# Patient Record
Sex: FEMALE | Race: WHITE | Employment: OTHER | URBAN - METROPOLITAN AREA
[De-identification: names, ages, dates, MRNs, and addresses within clinical notes are randomized per-mention and may not be internally consistent; named-entity substitution may affect disease eponyms.]

---

## 2017-01-30 LAB
CREATININE, EXTERNAL: 0.57
LDL-C, EXTERNAL: 156

## 2018-03-29 ENCOUNTER — HOSPITAL ENCOUNTER (EMERGENCY)
Age: 48
Discharge: HOME | End: 2018-03-29
Attending: EMERGENCY MEDICINE | Admitting: EMERGENCY MEDICINE
Payer: COMMERCIAL

## 2018-03-29 VITALS
WEIGHT: 143 LBS | HEART RATE: 112 BPM | DIASTOLIC BLOOD PRESSURE: 72 MMHG | SYSTOLIC BLOOD PRESSURE: 104 MMHG | HEIGHT: 58 IN | RESPIRATION RATE: 29 BRPM | BODY MASS INDEX: 30.02 KG/M2 | TEMPERATURE: 98.5 F | OXYGEN SATURATION: 98 %

## 2018-03-29 DIAGNOSIS — J45.21 MILD INTERMITTENT ASTHMA WITH ACUTE EXACERBATION: ICD-10-CM

## 2018-03-29 DIAGNOSIS — J01.11 ACUTE RECURRENT FRONTAL SINUSITIS: Primary | ICD-10-CM

## 2018-03-29 PROCEDURE — 94640 AIRWAY INHALATION TREATMENT: CPT

## 2018-03-29 PROCEDURE — 99283 EMERGENCY DEPT VISIT LOW MDM: CPT

## 2018-03-29 PROCEDURE — 77030029684 HC NEB SM VOL KT MONA -A

## 2018-03-29 PROCEDURE — 74011000250 HC RX REV CODE- 250: Performed by: EMERGENCY MEDICINE

## 2018-03-29 PROCEDURE — 74011636637 HC RX REV CODE- 636/637: Performed by: EMERGENCY MEDICINE

## 2018-03-29 RX ORDER — IPRATROPIUM BROMIDE AND ALBUTEROL SULFATE 2.5; .5 MG/3ML; MG/3ML
3 SOLUTION RESPIRATORY (INHALATION) ONCE
Status: COMPLETED | OUTPATIENT
Start: 2018-03-29 | End: 2018-03-29

## 2018-03-29 RX ORDER — ALBUTEROL SULFATE 90 UG/1
2 AEROSOL, METERED RESPIRATORY (INHALATION)
COMMUNITY

## 2018-03-29 RX ORDER — PREDNISONE 20 MG/1
60 TABLET ORAL DAILY
Qty: 15 TAB | Refills: 0 | Status: SHIPPED | OUTPATIENT
Start: 2018-03-29 | End: 2018-04-03

## 2018-03-29 RX ORDER — PREDNISONE 20 MG/1
60 TABLET ORAL
Status: COMPLETED | OUTPATIENT
Start: 2018-03-29 | End: 2018-03-29

## 2018-03-29 RX ORDER — LORATADINE 10 MG/1
TABLET ORAL
Qty: 30 TAB | Refills: 0 | Status: SHIPPED | OUTPATIENT
Start: 2018-03-29 | End: 2018-04-04

## 2018-03-29 RX ORDER — TRIAMCINOLONE ACETONIDE 55 UG/1
2 SPRAY, METERED NASAL DAILY
Qty: 1 BOTTLE | Refills: 0 | Status: SHIPPED | OUTPATIENT
Start: 2018-03-29 | End: 2018-04-04

## 2018-03-29 RX ADMIN — IPRATROPIUM BROMIDE AND ALBUTEROL SULFATE 3 ML: .5; 3 SOLUTION RESPIRATORY (INHALATION) at 20:13

## 2018-03-29 RX ADMIN — PREDNISONE 60 MG: 20 TABLET ORAL at 20:13

## 2018-03-30 NOTE — ED NOTES
I have reviewed discharge instructions with patient. The patient verbalized understanding. Patient armband removed and shredded. Pt is ambulatory with no acute distress noted at this time, pt alert and oriented. Stable at time of discharge. Vital signs stable. Pt is being discharged with 3 prescriptions at this time.

## 2018-03-30 NOTE — ED PROVIDER NOTES
[employer]  [facility] EMERGENCY DEPT      8:03 PM    Date: 3/29/2018  Patient Name: Yeimi Dasilva    History of Presenting Illness     Chief Complaint   Patient presents with    Shortness of Breath       History Provided By: Patient    Chief Complaint: Allergies  Duration:  Hours  Timing:  Progressive   Quality: Feels like an asthma attack  Severity: Moderate  Modifying Factors: Albuterol causes no relief of sx. Associated Symptoms: pressure behind eyes, wheezing    50 y.o. female with noted past medical history including asthma and allergies who presents to the emergency department with moderate and progressive allergies. Associated sx include wheezing and feeling pressure behind eyes. Patient states that she has severe allergies and that she is unable to get all of her medication and to make an appointment with an allergist because she's recently moved and waiting on insurance. Admits to feeling like she is having an asthma attack. Has all medications except for Xolair, which she used to receive every 2 weeks. She describes herself as an allergic asthmatic. States that she also has eczema and psoriasis. Has Albuterol pump for asthma with no relief of sx and has been put on steroids for asthma in the past. Denies any smoking/drinking. No other complaints. Nursing notes regarding the HPI and triage nursing notes were reviewed. Prior medical records were reviewed. Past History     Past Medical History:  No past medical history on file. Past Surgical History:  No past surgical history on file. Family History:  No family history on file. Social History:  Social History   Substance Use Topics    Smoking status: Not on file    Smokeless tobacco: Not on file    Alcohol use Not on file       Allergies:   Allergies   Allergen Reactions    Pcn [Penicillins] Anaphylaxis    Strawberry Anaphylaxis    Sulfur Anaphylaxis    Adhesive Tape-Silicones Hives    Depakote [Divalproex] Other (comments)     Paralysis      Thorazine [Chlorpromazine] Other (comments)     Not sure      Toradol [Ketorolac] Seizures    Tramadol Seizures       Patient's primary care provider (as noted in EPIC):  Opal Vasquez MD    Review of Systems     Review of Systems   Constitutional: Negative for diaphoresis. HENT: Negative for congestion. Eyes: Negative for discharge. Positive for feelings of pressure behind eyes   Respiratory: Positive for wheezing. Negative for cough and stridor. Cardiovascular: Negative for palpitations. Gastrointestinal: Negative for diarrhea and vomiting. Genitourinary: Negative for flank pain. Musculoskeletal: Negative for back pain. Allergic/Immunologic: Positive for environmental allergies. Neurological: Negative for weakness. Psychiatric/Behavioral: Negative for hallucinations. All other systems reviewed and are negative. Physical Exam       Visit Vitals    /72    Pulse (!) 112    Temp 98.5 °F (36.9 °C)    Resp 29    Ht 4' 10\" (1.473 m)    Wt 64.9 kg (143 lb)    SpO2 98%    BMI 29.89 kg/m2       PHYSICAL EXAM:    CONSTITUTIONAL:  Alert, in no apparent distress;  well developed;  well nourished. HEAD:  Normocephalic, atraumatic. Sinuses:  Sinus pressure with leaning head forward. Sinus tenderness to percussion. EYES:  EOMI. Non-icteric sclera. Normal conjunctiva. ENTM:  Nose:  no rhinorrhea. Throat:  no erythema or exudate, mucous membranes moist.  NECK:  No JVD. Supple  RESPIRATORY:  Chest clear, equal breath sounds, good air movement. CARDIOVASCULAR:  Regular rate and rhythm. No murmurs, rubs, or gallops. GI:  Normal bowel sounds, abdomen soft and non-tender. No rebound or guarding. BACK:  Non-tender. UPPER EXT:  Normal inspection. LOWER EXT:  No edema, no calf tenderness. Distal pulses intact. NEURO:  Moves all four extremities, and grossly normal motor exam.  SKIN:  No rashes;  Normal for age.   PSYCH:  Alert and normal affect. Diagnostic Study Results     Abnormal lab results from this emergency department encounter:  Labs Reviewed - No data to display    Lab values for this patient within approximately the last 12 hours:  No results found for this or any previous visit (from the past 12 hour(s)). Radiologist and cardiologist interpretations if available at time of this note:  No results found. Medication(s) ordered for patient during this emergency visit encounter:  Medications   albuterol-ipratropium (DUO-NEB) 2.5 MG-0.5 MG/3 ML (3 mL Nebulization Given 3/29/18 2013)   predniSONE (DELTASONE) tablet 60 mg (60 mg Oral Given 3/29/18 2013)       Medical Decision Making     I am the first provider for this patient. I reviewed the vital signs, available nursing notes, past medical history, past surgical history, family history and social history. Vital Signs:  Reviewed the patient's vital signs. ED COURSE:      DIAGNOSIS:  1. Acute sinusitis  2. Allergen induced asthma exacerbation. SPECIFIC PATIENT INSTRUCTIONS FROM THE PHYSICIAN WHO TREATED YOU IN THE ER TODAY:  1. Return if any concerns or worsening of condition(s)  2. FOLLOW UP APPOINTMENT:  Your primary doctor In 2-3 days. 3.  Nasacort AQ and claritin for the next week and then stop. If sinus congestion recurs, then restart the nasacort AQ and claritin for a week at a time. 4.  Use a Nedi Pot to help clear your sinuses. You may purchase the original Nedi Pot at a pharmacy or get a generic version at [employer] or [employer]. 5.  Prednisone as prescribed until finished. Patient is improved, resting quietly and comfortably. The patient will be discharged home. The patient was reassured that these symptoms do not appear to represent a serious or life threatening condition at this time. Warning signs of worsening condition were discussed and understood by the patient.      Based on patient's age, coexisting illness, exam, and the results of this ED evaluation, the decision to treat as an outpatient was made. Based on the information available at time of discharge, acute pathology requiring immediate intervention was deemed relative unlikely. While it is impossible to completely exclude the possibility of underlying serious disease or worsening of condition, I feel the relative likelihood is extremely low. I discussed this uncertainty with the patient, who understood ED evaluation and treatment and felt comfortable with the outpatient treatment plan. All questions regarding care, test results, and follow up were answered. The patient is stable and appropriate to discharge. They understand that they should return to the emergency department for any new or worsening symptoms. I stressed the importance of follow up for repeat assessment and possibly further evaluation/treatment. Coding Diagnoses     Clinical Impression:   1. Acute recurrent frontal sinusitis    2. Mild intermittent asthma with acute exacerbation        Disposition     Disposition:  Home. Britton Gala L. Shanon Closs, M.D. NATALIE Board Certified Emergency Physician    Provider Attestation:  If a scribe was utilized in generation of this patient record, I personally performed the services described in the documentation, reviewed the documentation, as recorded by the scribe in my presence, and it accurately records the patient's history of presenting illness, review of systems, patient physical examination, and procedures performed by me as the attending physician. Britton Gala L. Shanon Closs, M.D.   NATALIE Board Certified Emergency Physician  3/29/2018.  8:04 PM

## 2018-03-30 NOTE — DISCHARGE INSTRUCTIONS
SPECIFIC PATIENT INSTRUCTIONS FROM THE PHYSICIAN WHO TREATED YOU IN THE ER TODAY:  1. Return if any concerns or worsening of condition(s)  2. FOLLOW UP APPOINTMENT:  Your primary doctor In 2-3 days. 3.  Nasacort AQ and claritin for the next week and then stop. If sinus congestion recurs, then restart the nasacort AQ and claritin for a week at a time. 4.  Use a Nedi Pot to help clear your sinuses. You may purchase the original Nedi Pot at a pharmacy or get a generic version at [employer] or [employer]. 5.  Prednisone as prescribed until finished. Asthma Attack: Care Instructions  Your Care Instructions    During an asthma attack, the airways swell and narrow. This makes it hard to breathe. Severe asthma attacks can be life-threatening, but you can help prevent them by keeping your asthma under control and treating symptoms before they get bad. Symptoms include being short of breath, having chest tightness, coughing, and wheezing. Noting and treating these symptoms can also help you avoid future trips to the emergency room. The doctor has checked you carefully, but problems can develop later. If you notice any problems or new symptoms, get medical treatment right away. Follow-up care is a key part of your treatment and safety. Be sure to make and go to all appointments, and call your doctor if you are having problems. It's also a good idea to know your test results and keep a list of the medicines you take. How can you care for yourself at home? · Follow your asthma action plan to prevent and treat attacks. If you don't have an asthma action plan, work with your doctor to create one. · Take your asthma medicines exactly as prescribed. Talk to your doctor right away if you have any questions about how to take them. ¨ Use your quick-relief medicine when you have symptoms of an attack. Quick-relief medicine is usually an albuterol inhaler.  Some people need to use quick-relief medicine before they exercise. ¨ Take your controller medicine every day, not just when you have symptoms. Controller medicine is usually an inhaled corticosteroid. The goal is to prevent problems before they occur. Don't use your controller medicine to treat an attack that has already started. It doesn't work fast enough to help. ¨ If your doctor prescribed corticosteroid pills to use during an attack, take them exactly as prescribed. It may take hours for the pills to work, but they may make the episode shorter and help you breathe better. ¨ Keep your quick-relief medicine with you at all times. · Talk to your doctor before using other medicines. Some medicines, such as aspirin, can cause asthma attacks in some people. · If you have a peak flow meter, use it to check how well you are breathing. This can help you predict when an asthma attack is going to occur. Then you can take medicine to prevent the asthma attack or make it less severe. · Do not smoke or allow others to smoke around you. Avoid smoky places. Smoking makes asthma worse. If you need help quitting, talk to your doctor about stop-smoking programs and medicines. These can increase your chances of quitting for good. · Learn what triggers an asthma attack for you, and avoid the triggers when you can. Common triggers include colds, smoke, air pollution, dust, pollen, mold, pets, cockroaches, stress, and cold air. · Avoid colds and the flu. Get a pneumococcal vaccine shot. If you have had one before, ask your doctor if you need a second dose. Get a flu vaccine every fall. If you must be around people with colds or the flu, wash your hands often. When should you call for help? Call 911 anytime you think you may need emergency care. For example, call if:  ? · You have severe trouble breathing. ?Call your doctor now or seek immediate medical care if:  ? · Your symptoms do not get better after you have followed your asthma action plan.    ? · You have new or worse trouble breathing. ? · Your coughing and wheezing get worse. ? · You cough up dark brown or bloody mucus (sputum). ? · You have a new or higher fever. ? Watch closely for changes in your health, and be sure to contact your doctor if:  ? · You need to use quick-relief medicine on more than 2 days a week (unless it is just for exercise). ? · You cough more deeply or more often, especially if you notice more mucus or a change in the color of your mucus. ? · You are not getting better as expected. Where can you learn more? Go to http://polo-jay jay.info/. Enter Q966 in the search box to learn more about \"Asthma Attack: Care Instructions. \"  Current as of: May 12, 2017  Content Version: 11.4  © 1085-1371 [employer]. Care instructions adapted under license by [employer] (which disclaims liability or warranty for this information). If you have questions about a medical condition or this instruction, always ask your healthcare professional. [place] [address] any warranty or liability for your use of this information. Learning About Asthma Triggers  What are triggers? When you have asthma, certain things can make your symptoms worse. These are called triggers. They include:  · Cigarette smoke or air pollution. · Things you are allergic to, such as:  ¨ Pollen, mold, or dust mites. ¨ Pet hair, skin, or saliva. · Illnesses, like colds, flu, or pneumonia. · Exercise. · Dry, cold air. How do triggers affect asthma? Triggers can make it harder for your lungs to work as they should and can lead to sudden difficulty breathing and other symptoms. When you are around a trigger, an asthma attack is more likely. If your symptoms are severe, you may need emergency treatment or have to go to the hospital for treatment.   If you know what your triggers are and can avoid them, you may be able to prevent asthma attacks, reduce how often you have them, and make them less severe. What can you do to avoid triggers? The first thing is to know your triggers. When you are having symptoms, note the things around you that might be causing them. Then look for patterns in what may be triggering your symptoms. Record your triggers on a piece of paper or in an asthma diary. When you have your list of possible triggers, work with your doctor to find ways to avoid them. You also can check how well your lungs are working by measuring your peak expiratory flow (PEF) throughout the day. Your PEF may drop when you are near things that trigger symptoms. Here are some ways to avoid a few common triggers. · Do not smoke or allow others to smoke around you. If you need help quitting, talk to your doctor about stop-smoking programs and medicines. These can increase your chances of quitting for good. · If there is a lot of pollution, pollen, or dust outside, stay at home and keep your windows closed. Use an air conditioner or air filter in your home. Check your local weather report or newspaper for air quality and pollen reports. · Get a flu shot every year. Talk to your doctor about getting a pneumococcal shot. Wash your hands often to prevent infections. · Avoid exercising outdoors in cold weather. If you are outdoors in cold weather, wear a scarf around your face and breathe through your nose. How can you manage an asthma attack? · If you have an asthma action plan, follow the plan. In general:  ¨ Use your quick-relief inhaler as directed by your doctor. If your symptoms do not get better after you use your medicine, have someone take you to the emergency room. Call an ambulance if needed. ¨ If your doctor has given you other inhaled medicines or steroid pills, take them as directed. Where can you learn more? Go to [employer].be  Enter M564 in the search box to learn more about \"Learning About Asthma Triggers. \"   © 0762-7307 [employer], [employer]. Care instructions adapted under license by Richelle Castillo (which disclaims liability or warranty for this information). This care instruction is for use with your licensed healthcare professional. If you have questions about a medical condition or this instruction, always ask your healthcare professional. [address] any warranty or liability for your use of this information. Content Version: 48.9.744362; Last Revised: February 23, 2012                Asthma Action Plan: After Your Visit  Your Care Instructions  An asthma action plan is based on peak flow and asthma symptoms. Sorting symptoms and peak flow into red, yellow, and green \"zones\" can help you know how bad your asthma is and what actions you should take. Work with your doctor to make your plan. An action plan may include:  · The peak flow readings and symptoms for each zone. · What medicines to take in each zone. · When to call a doctor. · A list of emergency contact numbers. · A list of your asthma triggers. Follow-up care is a key part of your treatment and safety. Be sure to make and go to all appointments, and call your doctor if you are having problems. It's also a good idea to know your test results and keep a list of the medicines you take. How can you care for yourself at home? · Take your daily medicines to help minimize long-term damage and avoid asthma attacks. · Check your peak flow every morning and evening. This is the best way to know how well your lungs are working. · Check your action plan to see what zone you are in.  ¨ If you are in the green zone, keep taking your daily asthma medicines as prescribed. ¨ If you are in the yellow zone, you may be having or will soon have an asthma attack. You may not have any symptoms, but your lungs are not working as well as they should. Take the medicines listed in your action plan.  If you stay in the yellow zone, your doctor may need to increase the dose or add a medicine. ¨ If you are in the red zone, follow your action plan. If your symptoms or peak flow don't improve soon, you may need to go to the emergency room or be admitted to the hospital.  · Use an asthma diary. Write down your peak flow readings in the asthma diary. If you have an attack, write down what caused it (if you know), the symptoms, and what medicine you took. · Make sure you know how and when to call your doctor or go to the hospital.  · Take both the asthma action plan and the asthma diary--along with your peak flow meter and medicines--when you see your doctor. Tell your doctor if you are having trouble following your action plan. When should you call for help? Call 911 anytime you think you may need emergency care. For example, call if:  · You have severe trouble breathing. Call your doctor now or seek immediate medical care if:  · Your symptoms do not get better after you have followed your asthma action plan. · You cough up yellow, dark brown, or bloody mucus (sputum). Watch closely for changes in your health, and be sure to contact your doctor if:  · Your coughing and wheezing get worse. · You need to use quick-relief medicine on more than 2 days a week (unless it is just for exercise). · You need help figuring out what is triggering your asthma attacks. Where can you learn more? Go to [employer]plus.be  Enter B511 in the search box to learn more about \"Asthma Action Plan: After Your Visit. \"   © 5920-3427 [employer]. Care instructions adapted under license by Greyson Schneider (which disclaims liability or warranty for this information). This care instruction is for use with your licensed healthcare professional. If you have questions about a medical condition or this instruction, always ask your healthcare professional. [address] any warranty or liability for your use of this information.   Content Version: 46.0.705463; Last Revised: March 9, 2012                   Sinusitis: Care Instructions  Your Care Instructions    Sinusitis is an infection of the lining of the sinus cavities in your head. Sinusitis often follows a cold. It causes pain and pressure in your head and face. In most cases, sinusitis gets better on its own in 1 to 2 weeks. But some mild symptoms may last for several weeks. Sometimes antibiotics are needed. Follow-up care is a key part of your treatment and safety. Be sure to make and go to all appointments, and call your doctor if you are having problems. It's also a good idea to know your test results and keep a list of the medicines you take. How can you care for yourself at home? · Take an over-the-counter pain medicine, such as acetaminophen (Tylenol), ibuprofen (Advil, Motrin), or naproxen (Aleve). Read and follow all instructions on the label. · If the doctor prescribed antibiotics, take them as directed. Do not stop taking them just because you feel better. You need to take the full course of antibiotics. · Be careful when taking over-the-counter cold or flu medicines and Tylenol at the same time. Many of these medicines have acetaminophen, which is Tylenol. Read the labels to make sure that you are not taking more than the recommended dose. Too much acetaminophen (Tylenol) can be harmful. · Breathe warm, moist air from a steamy shower, a hot bath, or a sink filled with hot water. Avoid cold, dry air. Using a humidifier in your home may help. Follow the directions for cleaning the machine. · Use saline (saltwater) nasal washes to help keep your nasal passages open and wash out mucus and bacteria. You can buy saline nose drops at a grocery store or drugstore. Or you can make your own at home by adding 1 teaspoon of salt and 1 teaspoon of baking soda to 2 cups of distilled water.  If you make your own, fill a bulb syringe with the solution, insert the tip into your nostril, and squeeze gently. [employer] your nose. · Put a hot, wet towel or a warm gel pack on your face 3 or 4 times a day for 5 to 10 minutes each time. · Try a decongestant nasal spray like oxymetazoline (Afrin). Do not use it for more than 3 days in a row. Using it for more than 3 days can make your congestion worse. When should you call for help? Call your doctor now or seek immediate medical care if:  ? · You have new or worse swelling or redness in your face or around your eyes. ? · You have a new or higher fever. ? Watch closely for changes in your health, and be sure to contact your doctor if:  ? · You have new or worse facial pain. ? · The mucus from your nose becomes thicker (like pus) or has new blood in it. ? · You are not getting better as expected. Where can you learn more? Go to http://polo-jay jay.info/. Enter B880 in the search box to learn more about \"Sinusitis: Care Instructions. \"  Current as of: May 12, 2017  Content Version: 11.4  © 2439-8798 [employer]. Care instructions adapted under license by [employer] (which disclaims liability or warranty for this information). If you have questions about a medical condition or this instruction, always ask your healthcare professional. [place] [address] any warranty or liability for your use of this information. Saline Nasal Washes: Care Instructions  Your Care Instructions  Saline nasal washes help keep the nasal passages open by washing out thick or dried mucus. This simple remedy can help relieve symptoms of allergies, sinusitis, and colds. It also can make the nose feel more comfortable by keeping the mucous membranes moist. You may notice a little burning sensation in your nose the first few times you use the solution, but this usually gets better in a few days. Follow-up care is a key part of your treatment and safety.  Be sure to make and go to all appointments, and call your doctor if you are having problems. It's also a good idea to know your test results and keep a list of the medicines you take. How can you care for yourself at home? · You can buy premixed saline solution in a squeeze bottle or other sinus rinse products at a drugstore. Read and follow the instructions on the label. · You also can make your own saline solution by adding 1 teaspoon of salt and 1 teaspoon of baking soda to 2 cups of distilled water. · If you use a homemade solution, pour a small amount into a clean bowl. Using a rubber bulb syringe, squeeze the syringe and place the tip in the salt water. Pull a small amount of the salt water into the syringe by relaxing your hand. · Sit down with your head tilted slightly back. Do not lie down. Put the tip of the bulb syringe or the squeeze bottle a little way into one of your nostrils. Gently drip or squirt a few drops into the nostril. Repeat with the other nostril. Some sneezing and gagging are normal at first.  · Gently blow your nose. · Wipe the syringe or bottle tip clean after each use. · Repeat this 2 or 3 times a day. · Use nasal washes gently if you have nosebleeds often. When should you call for help? Watch closely for changes in your health, and be sure to contact your doctor if:  ? · You often get nosebleeds. ? · You have problems doing the nasal washes. Where can you learn more? Go to http://polo-jay jay.info/. Enter 117 [id number] in the search box to learn more about \"Saline Nasal Washes: Care Instructions. \"  Current as of: May 12, 2017  Content Version: 11.4  © 2131-6616 [employer]. Care instructions adapted under license by [employer] (which disclaims liability or warranty for this information). If you have questions about a medical condition or this instruction, always ask your healthcare professional. [address] any warranty or liability for your use of this information.          The Sinuses: Anatomy Sketch    Current as of: May 12, 2017  Content Version: 11.4  © 3038-2808 [employer]. Care instructions adapted under license by [employer] (which disclaims liability or warranty for this information). If you have questions about a medical condition or this instruction, always ask your healthcare professional. [address] any warranty or liability for your use of this information. [employer] Activation    Thank you for requesting access to [employer]. Please follow the instructions below to securely access and download your online medical record. [employer] allows you to send messages to your doctor, view your test results, renew your prescriptions, schedule appointments, and more. How Do I Sign Up? 1. In your internet browser, go to [email or web address]. 2. Click on the First Time User? Click Here link in the Sign In box. You will see the New Member Sign Up page. 3. Enter your [employer] Access Code exactly as it appears below. You will not need to use this code after youve completed the sign-up process. If you do not sign up before the expiration date, you must request a new code. [employer] Access Code: [email or web address]  Expires: 2018  8:09 PM (This is the date your [employer] access code will )    4. Enter the last four digits of your Social Security Number (xxxx) and Date of Birth (mm/dd/yyyy) as indicated and click Submit. You will be taken to the next sign-up page. 5. Create a [employer] ID. This will be your [employer] login ID and cannot be changed, so think of one that is secure and easy to remember. 6. Create a [employer] password. You can change your password at any time. 7. Enter your Password Reset Question and Answer. This can be used at a later time if you forget your password. 8. Enter your e-mail address. You will receive e-mail notification when new information is available in [address]. 9. Click Sign Up.  You can now view and download portions of your medical record. 10. Click the Download Summary menu link to download a portable copy of your medical information. Additional Information    If you have questions, please visit the Frequently Asked Questions section of the [employer] website at [email or web address]. [email or web address]. [employer]/. Remember, [employer] is NOT to be used for urgent needs. For medical emergencies, dial 911.

## 2018-04-04 ENCOUNTER — OFFICE VISIT (OUTPATIENT)
Dept: FAMILY MEDICINE CLINIC | Age: 48
End: 2018-04-04

## 2018-04-04 ENCOUNTER — HOSPITAL ENCOUNTER (OUTPATIENT)
Dept: LAB | Age: 48
Discharge: HOME | End: 2018-04-04
Payer: MEDICAID

## 2018-04-04 VITALS
HEIGHT: 58 IN | RESPIRATION RATE: 18 BRPM | DIASTOLIC BLOOD PRESSURE: 77 MMHG | OXYGEN SATURATION: 95 % | WEIGHT: 142 LBS | TEMPERATURE: 97.3 F | BODY MASS INDEX: 29.81 KG/M2 | HEART RATE: 81 BPM | SYSTOLIC BLOOD PRESSURE: 124 MMHG

## 2018-04-04 DIAGNOSIS — Z51.81 THERAPEUTIC DRUG MONITORING: ICD-10-CM

## 2018-04-04 DIAGNOSIS — T78.40XA ALLERGIC STATE, INITIAL ENCOUNTER: ICD-10-CM

## 2018-04-04 DIAGNOSIS — F41.9 ANXIETY: ICD-10-CM

## 2018-04-04 DIAGNOSIS — Z13.6 SCREENING FOR CARDIOVASCULAR CONDITION: ICD-10-CM

## 2018-04-04 DIAGNOSIS — M79.7 FIBROMYALGIA: ICD-10-CM

## 2018-04-04 DIAGNOSIS — L98.9 SKIN LESION: ICD-10-CM

## 2018-04-04 DIAGNOSIS — J45.909 MODERATE ASTHMA WITHOUT COMPLICATION, UNSPECIFIED WHETHER PERSISTENT: Primary | ICD-10-CM

## 2018-04-04 PROBLEM — Z85.89 HISTORY OF SQUAMOUS CELL CARCINOMA: Status: ACTIVE | Noted: 2018-04-04

## 2018-04-04 PROBLEM — Z85.42 HISTORY OF UTERINE CANCER: Status: ACTIVE | Noted: 2018-04-04

## 2018-04-04 PROBLEM — Z85.41 HISTORY OF CERVICAL CANCER: Status: ACTIVE | Noted: 2018-04-04

## 2018-04-04 PROBLEM — Z85.51 HISTORY OF BLADDER CANCER: Status: ACTIVE | Noted: 2018-04-04

## 2018-04-04 PROCEDURE — 80307 DRUG TEST PRSMV CHEM ANLYZR: CPT | Performed by: FAMILY MEDICINE

## 2018-04-04 PROCEDURE — 80061 LIPID PANEL: CPT | Performed by: FAMILY MEDICINE

## 2018-04-04 PROCEDURE — 80053 COMPREHEN METABOLIC PANEL: CPT | Performed by: FAMILY MEDICINE

## 2018-04-04 PROCEDURE — 84443 ASSAY THYROID STIM HORMONE: CPT | Performed by: FAMILY MEDICINE

## 2018-04-04 PROCEDURE — 85025 COMPLETE CBC W/AUTO DIFF WBC: CPT | Performed by: FAMILY MEDICINE

## 2018-04-04 RX ORDER — LORAZEPAM 1 MG/1
TABLET ORAL
COMMUNITY
End: 2018-04-04 | Stop reason: SDUPTHER

## 2018-04-04 RX ORDER — ACETAMINOPHEN, DIPHENHYDRAMINE HCL, PHENYLEPHRINE HCL 325; 25; 5 MG/1; MG/1; MG/1
1 TABLET ORAL
COMMUNITY

## 2018-04-04 RX ORDER — LORAZEPAM 1 MG/1
1 TABLET ORAL 2 TIMES DAILY
Qty: 60 TAB | Refills: 0 | Status: SHIPPED | OUTPATIENT
Start: 2018-05-04 | End: 2018-04-04 | Stop reason: SDUPTHER

## 2018-04-04 RX ORDER — LORAZEPAM 1 MG/1
1 TABLET ORAL 2 TIMES DAILY
Qty: 60 TAB | Refills: 0 | Status: SHIPPED | OUTPATIENT
Start: 2018-04-04 | End: 2018-04-04 | Stop reason: SDUPTHER

## 2018-04-04 RX ORDER — DICLOFENAC SODIUM 10 MG/G
GEL TOPICAL 4 TIMES DAILY
COMMUNITY
End: 2020-03-13

## 2018-04-04 RX ORDER — LORAZEPAM 1 MG/1
1 TABLET ORAL 2 TIMES DAILY
Qty: 60 TAB | Refills: 0 | Status: SHIPPED | OUTPATIENT
Start: 2018-06-04

## 2018-04-04 RX ORDER — DIPHENHYDRAMINE HCL 25 MG
25 CAPSULE ORAL
COMMUNITY
End: 2021-06-17

## 2018-04-04 RX ORDER — EPINEPHRINE 0.3 MG/.3ML
0.3 INJECTION SUBCUTANEOUS
COMMUNITY

## 2018-04-04 NOTE — MR AVS SNAPSHOT
[facility] [address] [facility] 
[phone] Patient: Steph Holland MRN: [id number] [id number] Visit Information Date & Time Provider Department Dept. Phone Encounter #  
 4/4/2018  2:15 PM Roberto Mendoza MD [facility] [phone] Follow-up Instructions Return in about 1 week (around 4/11/2018), or if symptoms worsen or fail to improve, for pap smear. Upcoming Health Maintenance Date Due Pneumococcal 19-64 Medium Risk (1 of 1 - PPSV23) 3/12/1989 DTaP/Tdap/Td series (1 - Tdap) 3/12/1991 PAP AKA CERVICAL CYTOLOGY 3/12/1991 Influenza Age 5 to Adult 8/1/2017 MEDICARE YEARLY EXAM 4/4/2018 Allergies as of 4/4/2018  Review Complete On: 4/4/2018 By: Rocael Schreiber MD  
  
 Severity Noted Reaction Type Reactions Pcn [Penicillins] High 03/29/2018    Anaphylaxis [place] High 03/29/2018    Anaphylaxis Sulfur High 03/29/2018    Anaphylaxis Adhesive Tape-silicones  76/20/4522    Hives Depakote [Divalproex]  03/29/2018    Other (comments) Paralysis Thorazine [Chlorpromazine]  03/29/2018    Other (comments) Not sure Toradol [Ketorolac]  03/29/2018    Seizures Tramadol  03/29/2018    Seizures Current Immunizations  Never Reviewed No immunizations on file. Not reviewed this visit You Were Diagnosed With   
  
 Codes Comments Moderate asthma without complication, unspecified whether persistent    -  Primary ICD-10-CM: J45.909 ICD-9-CM: 493.90 History of bladder cancer     ICD-10-CM: Z85.51 
ICD-9-CM: V10.51 History of squamous cell carcinoma     ICD-10-CM: Z85.89 ICD-9-CM: V10.89 History of cervical cancer     ICD-10-CM: Z85.41 
ICD-9-CM: V10.41 History of uterine cancer     ICD-10-CM: Z85.42 
ICD-9-CM: V10.42 Skin lesion     ICD-10-CM: L98.9 ICD-9-CM: 709.9 Allergic state, initial encounter     ICD-10-CM: T78.40XA ICD-9-CM: 995.3 Fibromyalgia     ICD-10-CM: M79.7 ICD-9-CM: 729.1 Anxiety     ICD-10-CM: F41.9 ICD-9-CM: 300.00 Therapeutic drug monitoring     ICD-10-CM: Z51.81 
ICD-9-CM: V58.83 Screening for cardiovascular condition     ICD-10-CM: Z13.6 ICD-9-CM: V81.2 Vitals BP Pulse Temp Resp Height(growth percentile) Weight(growth percentile) 124/77 (BP 1 Location: Left arm, BP Patient Position: Sitting) 81 97.3 °F (36.3 °C) (Oral) 18 4' 10\" (1.473 m) 142 lb (64.4 kg) SpO2 BMI Smoking Status 95% 29.68 kg/m2 Former Smoker BMI and BSA Data Body Mass Index Body Surface Area  
 29.68 kg/m 2 1.62 m 2 Preferred Pharmacy Pharmacy Name Phone [id number]- [place] [address] [place]it [phone] Your Updated Medication List  
  
   
This list is accurate as of 4/4/18  3:26 PM.  Always use your most recent med list.  
  
  
  
  
 albuterol 90 mcg/actuation inhaler Commonly known as:  PROVENTIL HFA, VENTOLIN HFA, PROAIR HFA Take 1 Puff by inhalation. BENADRYL 25 mg capsule Generic drug:  diphenhydrAMINE Take 25 mg by mouth every six (6) hours as needed. EPIPEN 0.3 mg/0.3 mL injection Generic drug:  EPINEPHrine  
0.3 mg by IntraMUSCular route once as needed. fluticasone-salmeterol 230-21 mcg/actuation inhaler Commonly known as:  ADVAIR HFA Take 2 Puffs by inhalation two (2) times a day. LORazepam 1 mg tablet Commonly known as:  ATIVAN Take 1 Tab by mouth two (2) times a day. Max Daily Amount: 2 mg. Start taking on:  6/4/2018  
  
 melatonin 10 mg Tab Take  by mouth. QVAR 80 mcg/actuation "[employer] Generic drug:  beclomethasone Take 1 Puff by inhalation two (2) times a day. VOLTAREN 1 % Gel Generic drug:  diclofenac Apply  to affected area four (4) times daily. Prescriptions Printed Refills LORazepam (ATIVAN) 1 mg tablet 0 Starting on: 6/4/2018 Sig: Take 1 Tab by mouth two (2) times a day. Max Daily Amount: 2 mg. Class: Print Route: Oral  
  
We Performed the Following REFERRAL TO ALLERGY [REF5 Custom] Comments:  
 Patient with allergy, gets Xolair every fortnight. Due for f/u care. REFERRAL TO DERMATOLOGY [REF19 Custom] Comments:  
 Patient has skin lesion left calf, similar to the squamous cell ca she had previously. Would like referral for this. REFERRAL TO PAIN CLINIC [ZEP08 Custom] Comments:  
 Patient would like to try trigger point injections Follow-up Instructions Return in about 1 week (around 4/11/2018), or if symptoms worsen or fail to improve, for pap smear. To-Do List   
 04/04/2018 Lab:  CBC WITH AUTOMATED DIFF   
  
 04/04/2018 Lab:  COMPLIANCE DRUG SCREEN/PRESCRIPTION MONITORING   
  
 04/04/2018 Lab:  LIPID PANEL   
  
 04/04/2018 Lab:  METABOLIC PANEL, COMPREHENSIVE   
  
 04/04/2018 Lab:  TSH 3RD GENERATION Referral Information Referral ID Referred By Referred To  
  
 [id number] Wolf Ayala N Not Available Visits Status Start Date End Date 1 New Request 4/4/18 4/4/19 If your referral has a status of pending review or denied, additional information will be sent to support the outcome of this decision. Referral ID Referred By Referred To  
 [id number] Wolf Ayala N Not Available Visits Status Start Date End Date 1 New Request 4/4/18 4/4/19 If your referral has a status of pending review or denied, additional information will be sent to support the outcome of this decision. Referral ID Referred By Referred To  
 [id number] Wolf Ayala N Not Available Visits Status Start Date End Date 1 New Request 4/4/18 4/4/19 If your referral has a status of pending review or denied, additional information will be sent to support the outcome of this decision. Introducing [facility]! [facility] introduces [facility] patient portal. Now you can access parts of your medical record, email your doctor's office, and request medication refills online. 1. In your internet browser, go to [email or web address] 2. Click on the First Time User? Click Here link in the Sign In box. You will see the New Member Sign Up page. 3. Enter your [facility] Access Code exactly as it appears below. You will not need to use this code after youve completed the sign-up process. If you do not sign up before the expiration date, you must request a new code. · [facility] Access Code: [email or web address] Expires: 6/27/2018  8:09 PM 
 
4. Enter the last four digits of your Social Security Number (xxxx) and Date of Birth (mm/dd/yyyy) as indicated and click Submit. You will be taken to the next sign-up page. 5. Create a [facility] ID. This will be your [facility] login ID and cannot be changed, so think of one that is secure and easy to remember. 6. Create a [facility] password. You can change your password at any time. 7. Enter your Password Reset Question and Answer. This can be used at a later time if you forget your password. 8. Enter your e-mail address. You will receive e-mail notification when new information is available in [address] 9. Click Sign Up. You can now view and download portions of your medical record. 10. Click the Download Summary menu link to download a portable copy of your medical information. If you have questions, please visit the Frequently Asked Questions section of the [employer] website. Remember, [facility] is NOT to be used for urgent needs. For medical emergencies, dial 911. Now available from your iPhone and Android! Please provide this summary of care documentation to your next provider. Your primary care clinician is listed as Roberto Prado. If you have any questions after today's visit, please call [phone].

## 2018-04-04 NOTE — PROGRESS NOTES
Chief Complaint   Patient presents with   [facility] Establish Care    Medication Evaluation     allergies/ asthma      1. Have you been to the ER, urgent care clinic since your last visit? Hospitalized since your last visit? No      2. Have you seen or consulted any other health care providers outside of the [facility] since your last visit? Include any pap smears or colon screening. No     HPI  Afshan Phan comes in to establish care. Anxiety: Patient has a history of anxiety. Has been followed up previously by her primary care provider and has been on lorazepam for this. Has been taking 1 mg 4 times a day. She states that she has tried very many other medications but that these did not work for her and she was unable to tolerate some of them. She declines to go to behavioral health saying that the in the past she went and that they did not help her. This was despite having many issues in her life that resulted in her losing a lot of things that she had worked hard for. States that she would not go to behavioral health if I asked her to do so. I did let her know that the at times I would need her to see behavioral health if I am unable to manage her anxiety and the that his what I do my practice. He does states that if it came to that she would leave the practice. I will cut back on her lorazepam.  We will have her take 1 mg twice a day and no more than that. I did recommend other medications to use for anxiety but states that she does not want to use these as they have not walked in the past.  I did have her sign a controlled substance agreement and that she also did give us urine for compliance drug screen. I did review her . No concerns. I did discuss various other methods of dealing with anxiety. Skin lesion: Patient has a skin lesion left cuff area that has changed. Started off as an eczematous plaque but later on became ulcerated and excoriated. Slight bleeding.   She does have a history of squamous cell carcinoma. I will refer to dermatology for possible biopsy. Eczema: Patient has a history of eczema. She does apply topical steroid cream.  Would like to see dermatology for this. Referral is placed. Fibromyalgia: Patient has fibromyalgia. She has previously been treated with trigger point injections. These did seem to help. This was done in the pain clinic in [facility]. She does request referral to a pain clinic. Continues to have generalized muscle aches and fatigue. Allergies: Patient has a history of allergies. She does states Xolair allergy shots. These were being given by her previous allergist in [facility]. Has not had these since December last year. I will place referral to see an allergist.  Asthma: Patient has a history of asthma. Has been followed up by an allergist.  She is on Advair, Qvar and albuterol. Does not need a refill of medication at the moment  Bipolar disorder: Patient has been diagnosed with bipolar disorder. She is not on any medication at the moment. Has declined to be seen by someone in behavioral health. We discussed this. Feels that she does not want someone telling her what to do. Past Medical History  Past Medical History:   Diagnosis Date    Asthma     Bipolar 1 disorder ([address]     Hiatal hernia        Surgical History  Past Surgical History:   Procedure Laterality Date    HX  SECTION      HX HYSTERECTOMY          Medications  Current Outpatient Prescriptions   Medication Sig Dispense Refill    diphenhydrAMINE (BENADRYL) 25 mg capsule Take 25 mg by mouth every six (6) hours as needed.  diclofenac (VOLTAREN) 1 % gel Apply  to affected area four (4) times daily.  melatonin 10 mg tab Take  by mouth.  EPINEPHrine (EPIPEN) 0.3 mg/0.3 mL injection 0.3 mg by IntraMUSCular route once as needed.  LORazepam (ATIVAN) 1 mg tablet Take 1 Tab by mouth two (2) times a day.  Max Daily Amount: 2 mg. 60 Tab 0    fluticasone-salmeterol (ADVAIR HFA) 230-21 mcg/actuation inhaler Take 2 Puffs by inhalation two (2) times a day.  albuterol (PROVENTIL HFA, VENTOLIN HFA, PROAIR HFA) 90 mcg/actuation inhaler Take 1 Puff by inhalation.  beclomethasone (QVAR) 80 mcg/actuation aero Take 1 Puff by inhalation two (2) times a day. Allergies  Allergies   Allergen Reactions    Pcn [Penicillins] Anaphylaxis    Strawberry Anaphylaxis    Sulfur Anaphylaxis    Adhesive Tape-Silicones Hives    Depakote [Divalproex] Other (comments)     Paralysis      Thorazine [Chlorpromazine] Other (comments)     Not sure      Toradol [Ketorolac] Seizures    Tramadol Seizures       Family History  Family History   Problem Relation Age of Onset    Thyroid Disease Mother     Diabetes Mother     Heart Attack Mother        Social History  Social History     Social History    Marital status:      Spouse name: N/A    Number of children: N/A    Years of education: N/A     Occupational History    Not on file.      Social History Main Topics    Smoking status: Former Smoker    Smokeless tobacco: Former User    Alcohol use 0.6 oz/week     1 Shots of liquor per week    Drug use: Not on file    Sexual activity: Not on file     Other Topics Concern    Not on file     Social History Narrative    No narrative on file       Review of Systems  Review of Systems - History obtained from chart review and the patient  General ROS: negative for - chills or fever  Psychological ROS: positive for - anxiety  Ophthalmic ROS: positive for - uses glasses  ENT ROS: negative  Allergy and Immunology ROS: positive for - seasonal allergies  Hematological and Lymphatic ROS: negative  Endocrine ROS: negative  Respiratory ROS: positive for - cough, shortness of breath and wheezing  Cardiovascular ROS: negative  Gastrointestinal ROS: no abdominal pain, change in bowel habits, or black or bloody stools  Genito-Urinary ROS: negative  Musculoskeletal ROS: positive for - joint pain and muscle pain  Neurological ROS: negative  Dermatological ROS: positive for - eczema and skin lesion changes    Vital Signs  Visit Vitals    /77 (BP 1 Location: Left arm, BP Patient Position: Sitting)    Pulse 81    Temp 97.3 °F (36.3 °C) (Oral)    Resp 18    Ht 4' 10\" (1.473 m)    Wt 142 lb (64.4 kg)    SpO2 95%    BMI 29.68 kg/m2         Physical Exam  Physical Examination: General appearance - oriented to person, place, and time, overweight, acyanotic, in no respiratory distress and well hydrated  Mental status - anxious, patient almost tearful when he talks about her past but is insistent on having something done the way she would want   Eyes - sclera anicteric  Ears - hearing grossly normal bilaterally  Nose - normal and patent, no erythema, discharge or polyps  Mouth - mucous membranes moist, pharynx normal without lesions  Neck -paracervical muscle discomfort to palpation    Lymphatics - no palpable lymphadenopathy  Chest - clear to auscultation, no wheezes, rales or rhonchi, symmetric air entry  Heart - normal rate and regular rhythm, S1 and S2 normal  Abdomen - no rebound tenderness noted  Back exam - limited range of motion paralumbar muscles  Neurological - Patient with generalized numbness, tingling and muscle aches,  no focal weakness  Extremities - intact peripheral pulses  Skin -patient has generalized eczematous rash all over the body. She also has lesion that is on her left posterior leg about 1 x 2 cm that is excoriated and has a scab forming. This is tender but there is no surrounding erythema, no discharge. Results  No results found for this or any previous visit. ASSESSMENT and PLAN    ICD-10-CM ICD-9-CM    1. Moderate asthma without complication, unspecified whether persistent J45.909 493.90    2. Skin lesion L98.9 709.9 REFERRAL TO DERMATOLOGY   3.  Allergic state, initial encounter T78.40XA 995.3 REFERRAL TO ALLERGY   4. Fibromyalgia M79.7 729.1 REFERRAL TO PAIN CLINIC      LORazepam (ATIVAN) 1 mg tablet      COLLECTION VENOUS BLOOD,VENIPUNCTURE      DISCONTINUED: LORazepam (ATIVAN) 1 mg tablet      DISCONTINUED: LORazepam (ATIVAN) 1 mg tablet   5. Anxiety F41.9 300.00 TSH 3RD GENERATION   6. Therapeutic drug monitoring Z51.81 V58.83 COMPLIANCE DRUG SCREEN/PRESCRIPTION MONITORING      COLLECTION VENOUS BLOOD,VENIPUNCTURE   7. Screening for cardiovascular condition Z13.6 V81.2 CBC WITH AUTOMATED DIFF      LIPID PANEL      METABOLIC PANEL, COMPREHENSIVE      COLLECTION VENOUS BLOOD,VENIPUNCTURE     lab results and schedule of future lab studies reviewed with patient  reviewed diet, exercise and weight control  reviewed medications and side effects in detail    I have discussed the diagnosis with the patient and the intended plan of care as seen in the above orders. The patient has received an after-visit summary and questions were answered concerning future plans. I have discussed medication, side effects, and warnings with the patient in detail. The patient verbalized understanding and is in agreement with the plan of care. The patient will contact the office with any additional concerns. More than 50% of visit spent counseling and coordinating care with patient face to face on anxiety and ways to manage this. Discussed need for compliance with treatment regimen, follow-up, and taking responsibility for her disease process.         Blake Workman MD

## 2018-04-05 PROBLEM — F41.9 ANXIETY: Status: ACTIVE | Noted: 2018-04-05

## 2018-04-05 LAB
ALBUMIN SERPL-MCNC: 3.9 G/DL (ref 3.4–5)
ALBUMIN/GLOB SERPL: 1.2 {RATIO} (ref 0.8–1.7)
ALP SERPL-CCNC: 106 U/L (ref 45–117)
ALT SERPL-CCNC: 89 U/L (ref 13–56)
ANION GAP SERPL CALC-SCNC: 7 MMOL/L (ref 3–18)
AST SERPL-CCNC: 38 U/L (ref 15–37)
BASOPHILS # BLD: 0 K/UL (ref 0–0.06)
BASOPHILS NFR BLD: 0 % (ref 0–2)
BILIRUB SERPL-MCNC: 0.4 MG/DL (ref 0.2–1)
BUN SERPL-MCNC: 15 MG/DL (ref 7–18)
BUN/CREAT SERPL: 17 (ref 12–20)
CALCIUM SERPL-MCNC: 8.9 MG/DL (ref 8.5–10.1)
CHLORIDE SERPL-SCNC: 106 MMOL/L (ref 100–108)
CHOLEST SERPL-MCNC: 199 MG/DL
CO2 SERPL-SCNC: 30 MMOL/L (ref 21–32)
CREAT SERPL-MCNC: 0.88 MG/DL (ref 0.6–1.3)
DIFFERENTIAL METHOD BLD: ABNORMAL
EOSINOPHIL # BLD: 0.2 K/UL (ref 0–0.4)
EOSINOPHIL NFR BLD: 1 % (ref 0–5)
ERYTHROCYTE [DISTWIDTH] IN BLOOD BY AUTOMATED COUNT: 15.5 % (ref 11.6–14.5)
GLOBULIN SER CALC-MCNC: 3.2 G/DL (ref 2–4)
GLUCOSE SERPL-MCNC: 108 MG/DL (ref 74–99)
HCT VFR BLD AUTO: 44.4 % (ref 35–45)
HDLC SERPL-MCNC: 56 MG/DL (ref 40–60)
HDLC SERPL: 3.6 {RATIO} (ref 0–5)
HGB BLD-MCNC: 13.4 G/DL (ref 12–16)
LDLC SERPL CALC-MCNC: 120.8 MG/DL (ref 0–100)
LIPID PROFILE,FLP: ABNORMAL
LYMPHOCYTES # BLD: 1.9 K/UL (ref 0.9–3.6)
LYMPHOCYTES NFR BLD: 16 % (ref 21–52)
MCH RBC QN AUTO: 26.4 PG (ref 24–34)
MCHC RBC AUTO-ENTMCNC: 30.2 G/DL (ref 31–37)
MCV RBC AUTO: 87.4 FL (ref 74–97)
MONOCYTES # BLD: 0.6 K/UL (ref 0.05–1.2)
MONOCYTES NFR BLD: 5 % (ref 3–10)
NEUTS SEG # BLD: 9.1 K/UL (ref 1.8–8)
NEUTS SEG NFR BLD: 78 % (ref 40–73)
PLATELET # BLD AUTO: 428 K/UL (ref 135–420)
PMV BLD AUTO: 11.6 FL (ref 9.2–11.8)
POTASSIUM SERPL-SCNC: 3.7 MMOL/L (ref 3.5–5.5)
PROT SERPL-MCNC: 7.1 G/DL (ref 6.4–8.2)
RBC # BLD AUTO: 5.08 M/UL (ref 4.2–5.3)
SODIUM SERPL-SCNC: 143 MMOL/L (ref 136–145)
TRIGL SERPL-MCNC: 111 MG/DL (ref ?–150)
TSH SERPL DL<=0.05 MIU/L-ACNC: 0.71 UIU/ML (ref 0.36–3.74)
VLDLC SERPL CALC-MCNC: 22.2 MG/DL
WBC # BLD AUTO: 11.8 K/UL (ref 4.6–13.2)

## 2018-04-12 LAB — DRUGS UR: NORMAL

## 2018-04-16 ENCOUNTER — TELEPHONE (OUTPATIENT)
Dept: FAMILY MEDICINE CLINIC | Age: 48
End: 2018-04-16

## 2018-04-16 NOTE — TELEPHONE ENCOUNTER
Incoming called received at this time with patient complaining for hoarness/ no voice at this time. Patient also states she is coughing up green mucous at this time. Patient wanted to know if she should go to the emergency room or wait for the first avaiable appointment which is tomorrow at 10. Patient denied Fever, chills, and sweats at this time. Advise patient if she getting worse she can either go to the emergency room or urgent care at this time.  Patient verbalized understanding

## 2018-04-17 ENCOUNTER — OFFICE VISIT (OUTPATIENT)
Dept: FAMILY MEDICINE CLINIC | Age: 48
End: 2018-04-17

## 2018-04-17 ENCOUNTER — HOSPITAL ENCOUNTER (EMERGENCY)
Age: 48
Discharge: HOME | End: 2018-04-17
Attending: EMERGENCY MEDICINE
Payer: MEDICAID

## 2018-04-17 VITALS
RESPIRATION RATE: 18 BRPM | DIASTOLIC BLOOD PRESSURE: 87 MMHG | BODY MASS INDEX: 29.39 KG/M2 | HEART RATE: 96 BPM | WEIGHT: 140 LBS | HEIGHT: 58 IN | SYSTOLIC BLOOD PRESSURE: 116 MMHG | TEMPERATURE: 97.3 F | OXYGEN SATURATION: 93 %

## 2018-04-17 VITALS
OXYGEN SATURATION: 95 % | TEMPERATURE: 97.4 F | HEART RATE: 90 BPM | SYSTOLIC BLOOD PRESSURE: 132 MMHG | BODY MASS INDEX: 29.39 KG/M2 | DIASTOLIC BLOOD PRESSURE: 89 MMHG | WEIGHT: 140 LBS | RESPIRATION RATE: 18 BRPM | HEIGHT: 58 IN

## 2018-04-17 DIAGNOSIS — B97.89 VIRAL LARYNGITIS: Primary | ICD-10-CM

## 2018-04-17 DIAGNOSIS — J04.0 LARYNGITIS: Primary | ICD-10-CM

## 2018-04-17 DIAGNOSIS — R49.0 HOARSENESS OF VOICE: ICD-10-CM

## 2018-04-17 DIAGNOSIS — J04.0 VIRAL LARYNGITIS: Primary | ICD-10-CM

## 2018-04-17 LAB
BASOPHILS # BLD: 0 K/UL (ref 0–0.06)
BASOPHILS NFR BLD: 0 % (ref 0–2)
DIFFERENTIAL METHOD BLD: ABNORMAL
EOSINOPHIL # BLD: 0.8 K/UL (ref 0–0.4)
EOSINOPHIL NFR BLD: 8 % (ref 0–5)
ERYTHROCYTE [DISTWIDTH] IN BLOOD BY AUTOMATED COUNT: 14.8 % (ref 11.6–14.5)
HCT VFR BLD AUTO: 44.7 % (ref 35–45)
HETEROPH AB SER QL: NEGATIVE
HGB BLD-MCNC: 14.9 G/DL (ref 12–16)
LYMPHOCYTES # BLD: 2.3 K/UL (ref 0.9–3.6)
LYMPHOCYTES NFR BLD: 22 % (ref 21–52)
MCH RBC QN AUTO: 27.1 PG (ref 24–34)
MCHC RBC AUTO-ENTMCNC: 33.3 G/DL (ref 31–37)
MCV RBC AUTO: 81.3 FL (ref 74–97)
MONOCYTES # BLD: 0.8 K/UL (ref 0.05–1.2)
MONOCYTES NFR BLD: 8 % (ref 3–10)
NEUTS SEG # BLD: 6.4 K/UL (ref 1.8–8)
NEUTS SEG NFR BLD: 62 % (ref 40–73)
PLATELET # BLD AUTO: 338 K/UL (ref 135–420)
PMV BLD AUTO: 10.7 FL (ref 9.2–11.8)
RBC # BLD AUTO: 5.5 M/UL (ref 4.2–5.3)
S PYO AG THROAT QL: NEGATIVE
VALID INTERNAL CONTROL?: YES
WBC # BLD AUTO: 10.2 K/UL (ref 4.6–13.2)

## 2018-04-17 PROCEDURE — 85025 COMPLETE CBC W/AUTO DIFF WBC: CPT | Performed by: EMERGENCY MEDICINE

## 2018-04-17 PROCEDURE — 74011250636 HC RX REV CODE- 250/636: Performed by: EMERGENCY MEDICINE

## 2018-04-17 PROCEDURE — 86308 HETEROPHILE ANTIBODY SCREEN: CPT | Performed by: EMERGENCY MEDICINE

## 2018-04-17 PROCEDURE — 99282 EMERGENCY DEPT VISIT SF MDM: CPT

## 2018-04-17 PROCEDURE — 96360 HYDRATION IV INFUSION INIT: CPT

## 2018-04-17 RX ORDER — ONDANSETRON 4 MG/1
4 TABLET, ORALLY DISINTEGRATING ORAL
Qty: 10 TAB | Refills: 0 | Status: SHIPPED | OUTPATIENT
Start: 2018-04-17 | End: 2018-05-27

## 2018-04-17 RX ADMIN — SODIUM CHLORIDE 1000 ML: 900 INJECTION, SOLUTION INTRAVENOUS at 17:05

## 2018-04-17 NOTE — PROGRESS NOTES
Chief Complaint   Patient presents with    Laryngitis    Fever     100 with red bumps started yesterday      1. Have you been to the ER, urgent care clinic since your last visit? Hospitalized since your last visit? No    2. Have you seen or consulted any other health care providers outside of the [address] [place] [address] since your last visit? Include any pap smears or colon screening. No     HPI  Afshan Malave comes in for acute care. Patient has hoarseness of voice and has been having fever and chills. Hoarseness of voice has been ongoing for days. States that he has had fever we up to 100°. There is some mild odynophagia. She feels her tonsils are swollen. Denies. Diaphoresis. She does feel tightness in her chest.  Does have occasional wheeze. Has tried taking over-the-counter medication without much relief. Patient comes in for evaluation. We did do a rapid strep test that is negative. This is more likely a viral illness. Patient is apprehensive because nothing has come out and she would like to know. In the past when she had similar symptoms she eventually had to be admitted in the hospital and have IV treatment done. We did discuss referral to see an ENT physician. I am not sure I can guarantee that she will be seen today in an ENT physicians office. Given she has a chest tightness and the feels that her symptoms have gotten worse I will refer her to the emergency room for further evaluation. May need radiological testing including chest CT scan. Her oxygenation here is normal.    Skin rash: Patient has a rash on her anterior abdominal wall that is maculopapular and itchy. She has a history of eczema but this feels like a different rash. She wondered about measles. I discussed measles symptoms with her. This is unlikely. This might be a viral exanthem rash or part of her eczema. Currently she will go to the emergency room for evaluation and testing.   We will follow-up with those results. Past Medical History  Past Medical History:   Diagnosis Date    Asthma     Bipolar 1 disorder ([address]     Hiatal hernia        Surgical History  Past Surgical History:   Procedure Laterality Date    HX  SECTION      HX HYSTERECTOMY          Medications  Current Outpatient Prescriptions   Medication Sig Dispense Refill    diphenhydrAMINE (BENADRYL) 25 mg capsule Take 25 mg by mouth every six (6) hours as needed.  diclofenac (VOLTAREN) 1 % gel Apply  to affected area four (4) times daily.  melatonin 10 mg tab Take  by mouth.  EPINEPHrine (EPIPEN) 0.3 mg/0.3 mL injection 0.3 mg by IntraMUSCular route once as needed.  [START ON 2018] LORazepam (ATIVAN) 1 mg tablet Take 1 Tab by mouth two (2) times a day. Max Daily Amount: 2 mg. 60 Tab 0    fluticasone-salmeterol (ADVAIR HFA) 230-21 mcg/actuation inhaler Take 2 Puffs by inhalation two (2) times a day.  albuterol (PROVENTIL HFA, VENTOLIN HFA, PROAIR HFA) 90 mcg/actuation inhaler Take 1 Puff by inhalation.  beclomethasone (QVAR) 80 mcg/actuation aero Take 1 Puff by inhalation two (2) times a day. Allergies  Allergies   Allergen Reactions    Pcn [Penicillins] Anaphylaxis    Strawberry Anaphylaxis    Sulfur Anaphylaxis    Adhesive Tape-Silicones Hives    Depakote [Divalproex] Other (comments)     Paralysis      Thorazine [Chlorpromazine] Other (comments)     Not sure      Toradol [Ketorolac] Seizures    Tramadol Seizures       Family History  Family History   Problem Relation Age of Onset    Thyroid Disease Mother     Diabetes Mother     Heart Attack Mother        Social History  Social History     Social History    Marital status:      Spouse name: N/A    Number of children: N/A    Years of education: N/A     Occupational History    Not on file.      Social History Main Topics    Smoking status: Former Smoker    Smokeless tobacco: Former User    Alcohol use 0.6 oz/week 1 Shots of liquor per week    Drug use: Not on file    Sexual activity: Not on file     Other Topics Concern    Not on file     Social History Narrative    No narrative on file       Review of Systems  Review of Systems - History obtained from chart review and the patient  General ROS: positive for  - chills, fatigue, fever and malaise  Psychological ROS: positive for - anxiety and behavioral disorder  Ophthalmic ROS: positive for - uses glasses  ENT ROS: positive for - sinus pain, sneezing, sore throat and vocal changes  Allergy and Immunology ROS: positive for - seasonal allergies  Respiratory ROS: positive for - shortness of breath and wheezing  negative for - cough or hemoptysis  Cardiovascular ROS: no chest pain or dyspnea on exertion  Gastrointestinal ROS: no abdominal pain, change in bowel habits, or black or bloody stools  Genito-Urinary ROS: no dysuria, trouble voiding, or hematuria  Musculoskeletal ROS: negative  Neurological ROS: no TIA or stroke symptoms  Dermatological ROS: positive for - pruritus and rash    Vital Signs  Visit Vitals    /89 (BP 1 Location: Left arm, BP Patient Position: Sitting)    Pulse 90    Temp 97.4 °F (36.3 °C) (Oral)    Resp 18    Ht 4' 10\" (1.473 m)    Wt 140 lb (63.5 kg)    SpO2 95%    BMI 29.26 kg/m2         Physical Exam  Physical Examination: General appearance - acyanotic, in no respiratory distress and ill-appearing  Mental status - alert, oriented to person, place, and time, affect appropriate to mood  Nose - mucosal congestion  Mouth - mucous membranes moist, pharynx normal without lesions  Neck - supple, no significant adenopathy  Lymphatics - no palpable lymphadenopathy  Chest - clear to auscultation, no wheezes, rales or rhonchi, symmetric air entry  Heart - S1 and S2 normal  Neurological - neck supple without rigidity  Musculoskeletal - no muscular tenderness noted  Extremities - no pedal edema noted  Skin - DERMATITIS NOTED: eczematoid dermatitis all over the extremities and the torso, patient also has a maculopapular rash anterior abdominal wall on the right and the left    Results  Results for orders placed or performed during the hospital encounter of 04/04/18   CBC WITH AUTOMATED DIFF   Result Value Ref Range    WBC 11.8 4.6 - 13.2 K/uL    RBC 5.08 4.20 - 5.30 M/uL    HGB 13.4 12.0 - 16.0 g/dL    HCT 44.4 35.0 - 45.0 %    MCV 87.4 74.0 - 97.0 FL    MCH 26.4 24.0 - 34.0 PG    MCHC 30.2 (L) 31.0 - 37.0 g/dL    RDW 15.5 (H) 11.6 - 14.5 %    PLATELET 741 (H) 357 - 420 K/uL    MPV 11.6 9.2 - 11.8 FL    NEUTROPHILS 78 (H) 40 - 73 %    LYMPHOCYTES 16 (L) 21 - 52 %    MONOCYTES 5 3 - 10 %    EOSINOPHILS 1 0 - 5 %    BASOPHILS 0 0 - 2 %    ABS. NEUTROPHILS 9.1 (H) 1.8 - 8.0 K/UL    ABS. LYMPHOCYTES 1.9 0.9 - 3.6 K/UL    ABS. MONOCYTES 0.6 0.05 - 1.2 K/UL    ABS. EOSINOPHILS 0.2 0.0 - 0.4 K/UL    ABS. BASOPHILS 0.0 0.0 - 0.06 K/UL    DF AUTOMATED     LIPID PANEL   Result Value Ref Range    LIPID PROFILE          Cholesterol, total 199 <200 MG/DL    Triglyceride 111 <150 MG/DL    HDL Cholesterol 56 40 - 60 MG/DL    LDL, calculated 120.8 (H) 0 - 100 MG/DL    VLDL, calculated 22.2 MG/DL    CHOL/HDL Ratio 3.6 0 - 5.0     METABOLIC PANEL, COMPREHENSIVE   Result Value Ref Range    Sodium 143 136 - 145 mmol/L    Potassium 3.7 3.5 - 5.5 mmol/L    Chloride 106 100 - 108 mmol/L    CO2 30 21 - 32 mmol/L    Anion gap 7 3.0 - 18 mmol/L    Glucose 108 (H) 74 - 99 mg/dL    BUN 15 7.0 - 18 MG/DL    Creatinine 0.88 0.6 - 1.3 MG/DL    BUN/Creatinine ratio 17 12 - 20      GFR est AA >60 >60 ml/min/1.73m2    GFR est non-AA >60 >60 ml/min/1.73m2    Calcium 8.9 8.5 - 10.1 MG/DL    Bilirubin, total 0.4 0.2 - 1.0 MG/DL    ALT (SGPT) 89 (H) 13 - 56 U/L    AST (SGOT) 38 (H) 15 - 37 U/L    Alk.  phosphatase 106 45 - 117 U/L    Protein, total 7.1 6.4 - 8.2 g/dL    Albumin 3.9 3.4 - 5.0 g/dL    Globulin 3.2 2.0 - 4.0 g/dL    A-G Ratio 1.2 0.8 - 1.7     TSH 3RD GENERATION   Result Value Ref Range    TSH 0.71 0.36 - 3.74 uIU/mL   COMPLIANCE DRUG SCREEN/PRESCRIPTION MONITORING   Result Value Ref Range    Summary FINAL        ASSESSMENT and PLAN    ICD-10-CM ICD-9-CM    1. Laryngitis J04.0 464.00 REFERRAL TO ENT-OTOLARYNGOLOGY   2. Hoarseness of voice R49.0 784.42 AMB POC RAPID STREP A      REFERRAL TO ENT-OTOLARYNGOLOGY   Rapid strep is negative. This is likely viral laryngitis. Patient does insist that she is unwell and feels that things are going to get worse. She states that her chest is tight and she cannot breathe. Given this I will refer her to the emergency room for further evaluation and testing. I did let her know that we would send her to an ENT doctor. She does want a laryngoscopy done. We will try to get her in as soon as possible. lab results and schedule of future lab studies reviewed with patient  reviewed diet, exercise and weight control  reviewed medications and side effects in detail    I have discussed the diagnosis with the patient and the intended plan of care as seen in the above orders. The patient has received an after-visit summary and questions were answered concerning future plans. I have discussed medication, side effects, and warnings with the patient in detail. The patient verbalized understanding and is in agreement with the plan of care. The patient will contact the office with any additional concerns.     Bia Vicente MD

## 2018-04-17 NOTE — ED PROVIDER NOTES
EMERGENCY DEPARTMENT HISTORY AND PHYSICAL EXAM    3:53 PM      Date: 4/17/2018  Patient Name: Ok Felder    History of Presenting Illness     Chief Complaint   Patient presents with    Skin Problem     body rash    Other     loss voice     History Provided By: Patient    Chief Complaint: Voice change  Duration: Yesterday   Timing:  Acute  Location: Voice  Quality: Loss  Severity: N/A  Modifying Factors: No relieving or worsening factors   Associated Symptoms: Rash      Additional History (Context): Ok Felder is a 50 y.o. female with PMHX of asthma, eczema, and fibromyalgia who presents with acute loss of voice, onset yesterday. Associated sxs include an itchy papular rash over abdomen and hands that began suddenly last night. Pt was seen by her PCP, had a negative strep, and \"was sent\" here. Pt notes laryngitis ~3 years ago, which she was admitted for. No modifying or aggravating factors were reported. No other symptoms or concerns were expressed. PCP: Pratik Worrell MD    Current Facility-Administered Medications   Medication Dose Route Frequency Provider Last Rate Last Dose    sodium chloride 0.9 % bolus infusion 1,000 mL  1,000 mL IntraVENous ONCE Madeline Stover MD 1,000 mL/hr at 04/17/18 1705 1,000 mL at 04/17/18 1705     Current Outpatient Prescriptions   Medication Sig Dispense Refill    ondansetron (ZOFRAN ODT) 4 mg disintegrating tablet Take 1 Tab by mouth every eight (8) hours as needed for Nausea. 10 Tab 0    Benzocaine-Menthol (CHLORASEPTIC SORE THROAT) 6-10 mg lozg 1 Lozenge by Mucous Membrane route as needed. 18 Lozenge 0    diphenhydrAMINE (BENADRYL) 25 mg capsule Take 25 mg by mouth every six (6) hours as needed.  diclofenac (VOLTAREN) 1 % gel Apply  to affected area four (4) times daily.  melatonin 10 mg tab Take  by mouth.  EPINEPHrine (EPIPEN) 0.3 mg/0.3 mL injection 0.3 mg by IntraMUSCular route once as needed.       [START ON 6/4/2018] LORazepam (ATIVAN) 1 mg tablet Take 1 Tab by mouth two (2) times a day. Max Daily Amount: 2 mg. 60 Tab 0    fluticasone-salmeterol (ADVAIR HFA) 230-21 mcg/actuation inhaler Take 2 Puffs by inhalation two (2) times a day.  albuterol (PROVENTIL HFA, VENTOLIN HFA, PROAIR HFA) 90 mcg/actuation inhaler Take 1 Puff by inhalation.  beclomethasone (QVAR) 80 mcg/actuation aero Take 1 Puff by inhalation two (2) times a day. Past History     Past Medical History:  Past Medical History:   Diagnosis Date    Asthma     Bipolar 1 disorder ([address]     Hiatal hernia        Past Surgical History:  Past Surgical History:   Procedure Laterality Date    HX  SECTION      HX HYSTERECTOMY         Family History:  Family History   Problem Relation Age of Onset    Thyroid Disease Mother     Diabetes Mother     Heart Attack Mother        Social History:  Social History   Substance Use Topics    Smoking status: Former Smoker    Smokeless tobacco: Former User    Alcohol use 0.6 oz/week     1 Shots of liquor per week       Allergies: Allergies   Allergen Reactions    Pcn [Penicillins] Anaphylaxis    Strawberry Anaphylaxis    Sulfur Anaphylaxis    Adhesive Tape-Silicones Hives    Depakote [Divalproex] Other (comments)     Paralysis      Thorazine [Chlorpromazine] Other (comments)     Not sure      Toradol [Ketorolac] Seizures    Tramadol Seizures         Review of Systems     Review of Systems   Constitutional: Negative for chills and fever. HENT: Positive for voice change (loss). Respiratory: Negative for shortness of breath. Cardiovascular: Negative for chest pain. Gastrointestinal: Negative for diarrhea, nausea and vomiting. Skin: Positive for rash (itchy, papular over abd and hands). All other systems reviewed and are negative.         Physical Exam     Visit Vitals    /87 (BP 1 Location: Left arm, BP Patient Position: At rest)    Pulse 96    Temp 97.3 °F (36.3 °C)    Resp 18  Ht 4' 10\" (1.473 m)    Wt 63.5 kg (140 lb)    SpO2 93%    BMI 29.26 kg/m2       Physical Exam   Constitutional: She is oriented to person, place, and time. She appears well-developed and well-nourished. No distress. HENT:   Head: Normocephalic and atraumatic. Mild erythematous posterior pharynx. Eyes: Conjunctivae and EOM are normal. Right eye exhibits no discharge. Left eye exhibits no discharge. No scleral icterus. Neck: Normal range of motion. Neck supple. No tracheal deviation present. Cardiovascular: Normal rate, regular rhythm and normal heart sounds. No murmur heard. Pulmonary/Chest: Effort normal and breath sounds normal. No respiratory distress. She has no wheezes. She has no rales. Abdominal: Soft. She exhibits no distension. There is no tenderness. There is no rebound and no guarding. Musculoskeletal: Normal range of motion. She exhibits no edema or deformity. Neurological: She is alert and oriented to person, place, and time. No cranial nerve deficit. Skin: Skin is warm and dry. Rash (excoriations over abd and hands) noted. Rash is papular. She is not diaphoretic. Psychiatric: She has a normal mood and affect. Her behavior is normal. Judgment and thought content normal.   Nursing note and vitals reviewed.         Diagnostic Study Results     Labs -  Recent Results (from the past 12 hour(s))   AMB POC RAPID STREP A    Collection Time: 04/17/18  2:54 PM   Result Value Ref Range    VALID INTERNAL CONTROL POC Yes     Group A Strep Ag Negative Negative   CBC WITH AUTOMATED DIFF    Collection Time: 04/17/18  5:05 PM   Result Value Ref Range    WBC 10.2 4.6 - 13.2 K/uL    RBC 5.50 (H) 4.20 - 5.30 M/uL    HGB 14.9 12.0 - 16.0 g/dL    HCT 44.7 35.0 - 45.0 %    MCV 81.3 74.0 - 97.0 FL    MCH 27.1 24.0 - 34.0 PG    MCHC 33.3 31.0 - 37.0 g/dL    RDW 14.8 (H) 11.6 - 14.5 %    PLATELET 035 586 - 422 K/uL    MPV 10.7 9.2 - 11.8 FL    NEUTROPHILS 62 40 - 73 %    LYMPHOCYTES 22 21 - 52 % MONOCYTES 8 3 - 10 %    EOSINOPHILS 8 (H) 0 - 5 %    BASOPHILS 0 0 - 2 %    ABS. NEUTROPHILS 6.4 1.8 - 8.0 K/UL    ABS. LYMPHOCYTES 2.3 0.9 - 3.6 K/UL    ABS. MONOCYTES 0.8 0.05 - 1.2 K/UL    ABS. EOSINOPHILS 0.8 (H) 0.0 - 0.4 K/UL    ABS. BASOPHILS 0.0 0.0 - 0.06 K/UL    DF AUTOMATED     MONONUCLEOSIS SCREEN    Collection Time: 04/17/18  5:05 PM   Result Value Ref Range    Mononucleosis screen NEGATIVE  NEG         Radiologic Studies -   No orders to display         Medical Decision Making   I am the first provider for this patient. I reviewed the vital signs, available nursing notes, past medical history, past surgical history, family history and social history. Vital Signs-Reviewed the patient's vital signs. Pulse Oximetry Analysis -  93% on room air     Records Reviewed: Nursing Notes (Time of Review: 3:53 PM)    Provider Notes (Medical Decision Making): Pt with laryngitis and viral rash. Stable vital signs and normal labs. No respiratory distress or airway compromise. DC with supportive care. Diagnosis     Clinical Impression:   1. Viral laryngitis        Disposition: discharge    Follow-up Information     Follow up With Details Comments Contact Info    Roberto Orlando MD Schedule an appointment as soon as possible for a visit in 2 days For primary care follow up [address]      [facility] EMERGENCY DEPT Go to As needed, if symptoms worsen [address] [place] [address]  [phone]           Patient's Medications   Start Taking    BENZOCAINE-MENTHOL (CHLORASEPTIC SORE THROAT) 6-10 MG LOZG    1 Lozenge by Mucous Membrane route as needed. ONDANSETRON (ZOFRAN ODT) 4 MG DISINTEGRATING TABLET    Take 1 Tab by mouth every eight (8) hours as needed for Nausea. Continue Taking    ALBUTEROL (PROVENTIL HFA, VENTOLIN HFA, PROAIR HFA) 90 MCG/ACTUATION INHALER    Take 1 Puff by inhalation.     BECLOMETHASONE (QVAR) 80 MCG/ACTUATION AERO    Take 1 Puff by inhalation two (2) times a day. DICLOFENAC (VOLTAREN) 1 % GEL    Apply  to affected area four (4) times daily. DIPHENHYDRAMINE (BENADRYL) 25 MG CAPSULE    Take 25 mg by mouth every six (6) hours as needed. EPINEPHRINE (EPIPEN) 0.3 MG/0.3 ML INJECTION    0.3 mg by IntraMUSCular route once as needed. FLUTICASONE-SALMETEROL (ADVAIR HFA) 230-21 MCG/ACTUATION INHALER    Take 2 Puffs by inhalation two (2) times a day. LORAZEPAM (ATIVAN) 1 MG TABLET    Take 1 Tab by mouth two (2) times a day. Max Daily Amount: 2 mg. MELATONIN 10 MG TAB    Take  by mouth. These Medications have changed    No medications on file   Stop Taking    No medications on file     _______________________________    Attestations:  1 [facility] acting as a scribe for and in the presence of Alberto Humphries MD      April 17, 2018 at 6:00 PM       Provider Attestation:      I personally performed the services described in the documentation, reviewed the documentation, as recorded by the scribe in my presence, and it accurately and completely records my words and actions.  April 17, 2018 at 6:00 PM - Alberto Humphries MD    _______________________________

## 2018-04-17 NOTE — PATIENT INSTRUCTIONS
Patient with worsening hoarseness of voice and skin rash. Has fever and chills. Advised to go to ED as she feels symptoms are getting worse and chest tightness.

## 2018-04-17 NOTE — MR AVS SNAPSHOT
[facility] [address] [facility] 
[phone] Patient: Timothy Mathew MRN: [id number] YTT:[id number] Visit Information Date & Time Provider Department Dept. Phone Encounter #  
 4/17/2018  2:15 PM Ines Mtz [phone] [id number] Upcoming Health Maintenance Date Due Pneumococcal 19-64 Medium Risk (1 of 1 - PPSV23) 3/12/1989 DTaP/Tdap/Td series (1 - Tdap) 3/12/1991 PAP AKA CERVICAL CYTOLOGY 3/12/1991 Influenza Age 5 to Adult 8/1/2017 MEDICARE YEARLY EXAM 4/4/2018 Allergies as of 4/17/2018  Review Complete On: 4/17/2018 By: Wilfrido Dodd MD  
  
 Severity Noted Reaction Type Reactions Pcn [Penicillins] High 03/29/2018    Anaphylaxis [place] High 03/29/2018    Anaphylaxis Sulfur High 03/29/2018    Anaphylaxis Adhesive Tape-silicones  23/03/5241    Hives Depakote [Divalproex]  03/29/2018    Other (comments) Paralysis Thorazine [Chlorpromazine]  03/29/2018    Other (comments) Not sure Toradol [Ketorolac]  03/29/2018    Seizures Tramadol  03/29/2018    Seizures Current Immunizations  Never Reviewed No immunizations on file. Not reviewed this visit You Were Diagnosed With   
  
 Codes Comments Hoarseness of voice    -  Primary ICD-10-CM: R49.0 ICD-9-CM: 784.42 Laryngitis     ICD-10-CM: J04.0 ICD-9-CM: 464.00 Vitals BP Pulse Temp Resp Height(growth percentile) Weight(growth percentile) 132/89 (BP 1 Location: Left arm, BP Patient Position: Sitting) 90 97.4 °F (36.3 °C) (Oral) 18 4' 10\" (1.473 m) 140 lb (63.5 kg) SpO2 BMI Smoking Status 95% 29.26 kg/m2 Former Smoker BMI and BSA Data Body Mass Index Body Surface Area  
 29.26 kg/m 2 1.61 m 2 Preferred Pharmacy Pharmacy Name Phone [id number], [address] [place]gu[place] [phone] Your Updated Medication List  
  
   
This list is accurate as of 4/17/18  3:06 PM.  Always use your most recent med list.  
  
  
  
  
 albuterol 90 mcg/actuation inhaler Commonly known as:  PROVENTIL HFA, VENTOLIN HFA, PROAIR HFA Take 1 Puff by inhalation. BENADRYL 25 mg capsule Generic drug:  diphenhydrAMINE Take 25 mg by mouth every six (6) hours as needed. EPIPEN 0.3 mg/0.3 mL injection Generic drug:  EPINEPHrine  
0.3 mg by IntraMUSCular route once as needed. fluticasone-salmeterol 230-21 mcg/actuation inhaler Commonly known as:  ADVAIR HFA Take 2 Puffs by inhalation two (2) times a day. LORazepam 1 mg tablet Commonly known as:  ATIVAN Take 1 Tab by mouth two (2) times a day. Max Daily Amount: 2 mg. Start taking on:  6/4/2018  
  
 melatonin 10 mg Tab Take  by mouth. QVAR 80 mcg/actuation [employer] Generic drug:  beclomethasone Take 1 Puff by inhalation two (2) times a day. VOLTAREN 1 % Gel Generic drug:  diclofenac Apply  to affected area four (4) times daily. We Performed the Following AMB POC RAPID STREP A [34115 CPT(R)] REFERRAL TO ENT-OTOLARYNGOLOGY [HVT08 Custom] Referral Information Referral ID Referred By Referred To  
  
 [id number] Areli Hidden N Not Available Visits Status Start Date End Date 1 New Request 4/17/18 4/17/19 If your referral has a status of pending review or denied, additional information will be sent to support the outcome of this decision. Patient Instructions Patient with worsening hoarseness of voice and skin rash. Has fever and chills. Advised to go to ED as she feels symptoms are getting worse and chest tightness. Introducing hospitals & HEALTH SERVICES! Radha Bales introduces [employer] patient portal. Now you can access parts of your medical record, email your doctor's office, and request medication refills online.    
 
1. In your internet browser, go to [email or web address] 2. Click on the First Time User? Click Here link in the Sign In box. You will see the New Member Sign Up page. 3. Enter your [employer] Access Code exactly as it appears below. You will not need to use this code after youve completed the sign-up process. If you do not sign up before the expiration date, you must request a new code. · [employer] Access Code: [email or web address] Expires: 6/27/2018  8:09 PM 
 
4. Enter the last four digits of your Social Security Number (xxxx) and Date of Birth (mm/dd/yyyy) as indicated and click Submit. You will be taken to the next sign-up page. 5. Create a Histogenicst ID. This will be your [employer] login ID and cannot be changed, so think of one that is secure and easy to remember. 6. Create a [employer] password. You can change your password at any time. 7. Enter your Password Reset Question and Answer. This can be used at a later time if you forget your password. 8. Enter your e-mail address. You will receive e-mail notification when new information is available in [address] 9. Click Sign Up. You can now view and download portions of your medical record. 10. Click the Download Summary menu link to download a portable copy of your medical information. If you have questions, please visit the Frequently Asked Questions section of the [employer] website. Remember, [employer] is NOT to be used for urgent needs. For medical emergencies, dial 911. Now available from your iPhone and Android! Please provide this summary of care documentation to your next provider. Your primary care clinician is listed as Roberto Prado. If you have any questions after today's visit, please call [phone].

## 2018-04-17 NOTE — DISCHARGE INSTRUCTIONS
Laryngitis: Care Instructions  Your Care Instructions    Laryngitis is an inflammation of the voice box (larynx) that causes your voice to become raspy or hoarse. It can be short-lived or long-lasting. Most of the time, laryngitis comes on quickly and lasts as long as 2 weeks. It is caused by overuse, irritation, or infection of the vocal cords inside the larynx. Some of the most common causes are a cold, the flu, or allergies. Loud talking, shouting, cheering, or singing also can cause laryngitis. Stomach acid that backs up into the throat also can make you lose your voice. Resting your voice and taking other steps at home can help you get your voice back. Follow-up care is a key part of your treatment and safety. Be sure to make and go to all appointments, and call your doctor if you are having problems. It's also a good idea to know your test results and keep a list of the medicines you take. How can you care for yourself at home? · Follow your doctor's directions for treating the condition that caused you to lose your voice. If your doctor prescribed antibiotics, take them as directed. Do not stop taking them just because you feel better. You need to take the full course of antibiotics. · Before you use cough and cold medicines, check the label. They may not be safe for young children or for people with certain health problems. · Try to keep stomach acid from backing up into your throat. Do not eat just before bedtime. Reduce the amount of coffee and alcohol you drink, and eat healthy foods. Taking over-the-counter acid reducers can help when these steps are not enough. In some cases, you may need prescription medicine. · Rest your voice. You do not have to stop speaking, but use your voice as little as possible. Speak softly but do not whisper; whispering can bother your larynx more than speaking softly. Avoid talking on the telephone or trying to speak loudly. · Try not to clear your throat.  This can cause more irritation of your larynx. Take an over-the-counter cough suppressant (if your doctor recommends it) if you have a dry cough that does not produce mucus. · Do not smoke or allow others to smoke around you. If you need help quitting, talk to your doctor about stop-smoking programs and medicines. These can increase your chances of quitting for good. · Use a humidifier or vaporizer to add moisture to your bedroom. Humidity helps to thin the mucus in the nasal membranes that causes stuffiness or postnasal drip. Follow the directions for cleaning the machine. · Drink plenty of fluids, enough so that your urine is light yellow or clear like water. If you have kidney, heart, or liver disease and have to limit fluids, talk with your doctor before you increase the amount of fluids you drink. · Use saline (saltwater) nasal washes to help keep your nasal passages open and wash out mucus and bacteria. You can buy saline nose drops at a grocery store or drugstore. Or, you can make your own at home by mixing ½ teaspoon salt, 1 cup water (at room temperature), and ½ teaspoon baking soda. If you make your own, fill a bulb syringe with the solution, insert the tip into your nostril, and squeeze gently. Mickiel Sickle your nose. When should you call for help? Call 911 anytime you think you may need emergency care. For example, call if:  ? · You have trouble breathing. ?Call your doctor now or seek immediate medical care if:  ? · You have new or worse pain. ? · You have trouble swallowing. ? Watch closely for changes in your health, and be sure to contact your doctor if:  ? · You do not get better as expected. Where can you learn more? Go to http://polo-jay jay.info/. Enter H439 in the search box to learn more about \"Laryngitis: Care Instructions. \"  Current as of: May 12, 2017  Content Version: 11.4  © 0477-8078 [employer].  Care instructions adapted under license by [employer] (which disclaims liability or warranty for this information). If you have questions about a medical condition or this instruction, always ask your healthcare professional. [address] any warranty or liability for your use of this information.

## 2018-04-25 ENCOUNTER — APPOINTMENT (OUTPATIENT)
Dept: GENERAL RADIOLOGY | Age: 48
End: 2018-04-25
Attending: EMERGENCY MEDICINE
Payer: MEDICAID

## 2018-04-25 ENCOUNTER — HOSPITAL ENCOUNTER (EMERGENCY)
Age: 48
Discharge: HOME | End: 2018-04-25
Attending: EMERGENCY MEDICINE
Payer: MEDICAID

## 2018-04-25 VITALS
WEIGHT: 140 LBS | HEART RATE: 115 BPM | BODY MASS INDEX: 29.39 KG/M2 | DIASTOLIC BLOOD PRESSURE: 71 MMHG | OXYGEN SATURATION: 99 % | TEMPERATURE: 98.4 F | HEIGHT: 58 IN | SYSTOLIC BLOOD PRESSURE: 145 MMHG | RESPIRATION RATE: 24 BRPM

## 2018-04-25 DIAGNOSIS — J18.9 COMMUNITY ACQUIRED PNEUMONIA OF LEFT LUNG, UNSPECIFIED PART OF LUNG: Primary | ICD-10-CM

## 2018-04-25 LAB
ALBUMIN SERPL-MCNC: 3.6 G/DL (ref 3.4–5)
ALBUMIN/GLOB SERPL: 1.1 {RATIO} (ref 0.8–1.7)
ALP SERPL-CCNC: 111 U/L (ref 45–117)
ALT SERPL-CCNC: 29 U/L (ref 13–56)
ANION GAP SERPL CALC-SCNC: 7 MMOL/L (ref 3–18)
AST SERPL-CCNC: 19 U/L (ref 15–37)
ATRIAL RATE: 111 BPM
BASOPHILS # BLD: 0 K/UL (ref 0–0.06)
BASOPHILS NFR BLD: 0 % (ref 0–2)
BILIRUB SERPL-MCNC: 0.4 MG/DL (ref 0.2–1)
BUN SERPL-MCNC: 8 MG/DL (ref 7–18)
BUN/CREAT SERPL: 11 (ref 12–20)
CALCIUM SERPL-MCNC: 8.6 MG/DL (ref 8.5–10.1)
CALCULATED P AXIS, ECG09: 63 DEGREES
CALCULATED R AXIS, ECG10: 23 DEGREES
CALCULATED T AXIS, ECG11: 14 DEGREES
CHLORIDE SERPL-SCNC: 109 MMOL/L (ref 100–108)
CK MB CFR SERPL CALC: NORMAL % (ref 0–4)
CK MB SERPL-MCNC: <1 NG/ML (ref 5–25)
CK SERPL-CCNC: 166 U/L (ref 26–192)
CO2 SERPL-SCNC: 26 MMOL/L (ref 21–32)
CREAT SERPL-MCNC: 0.73 MG/DL (ref 0.6–1.3)
DIAGNOSIS, 93000: NORMAL
DIFFERENTIAL METHOD BLD: ABNORMAL
EOSINOPHIL # BLD: 0.7 K/UL (ref 0–0.4)
EOSINOPHIL NFR BLD: 11 % (ref 0–5)
ERYTHROCYTE [DISTWIDTH] IN BLOOD BY AUTOMATED COUNT: 14.8 % (ref 11.6–14.5)
GLOBULIN SER CALC-MCNC: 3.3 G/DL (ref 2–4)
GLUCOSE SERPL-MCNC: 110 MG/DL (ref 74–99)
HCT VFR BLD AUTO: 40 % (ref 35–45)
HGB BLD-MCNC: 13.2 G/DL (ref 12–16)
LACTATE BLD-SCNC: 1.1 MMOL/L (ref 0.4–2)
LYMPHOCYTES # BLD: 1 K/UL (ref 0.9–3.6)
LYMPHOCYTES NFR BLD: 16 % (ref 21–52)
MCH RBC QN AUTO: 26.5 PG (ref 24–34)
MCHC RBC AUTO-ENTMCNC: 33 G/DL (ref 31–37)
MCV RBC AUTO: 80.3 FL (ref 74–97)
MONOCYTES # BLD: 0.5 K/UL (ref 0.05–1.2)
MONOCYTES NFR BLD: 8 % (ref 3–10)
NEUTS SEG # BLD: 3.9 K/UL (ref 1.8–8)
NEUTS SEG NFR BLD: 65 % (ref 40–73)
P-R INTERVAL, ECG05: 116 MS
PLATELET # BLD AUTO: 302 K/UL (ref 135–420)
PMV BLD AUTO: 10.1 FL (ref 9.2–11.8)
POTASSIUM SERPL-SCNC: 3.4 MMOL/L (ref 3.5–5.5)
PROT SERPL-MCNC: 6.9 G/DL (ref 6.4–8.2)
Q-T INTERVAL, ECG07: 322 MS
QRS DURATION, ECG06: 74 MS
QTC CALCULATION (BEZET), ECG08: 437 MS
RBC # BLD AUTO: 4.98 M/UL (ref 4.2–5.3)
SODIUM SERPL-SCNC: 142 MMOL/L (ref 136–145)
TROPONIN I SERPL-MCNC: <0.02 NG/ML (ref 0–0.04)
VENTRICULAR RATE, ECG03: 111 BPM
WBC # BLD AUTO: 6.1 K/UL (ref 4.6–13.2)

## 2018-04-25 PROCEDURE — 77030013140 HC MSK NEB VYRM -A

## 2018-04-25 PROCEDURE — 99284 EMERGENCY DEPT VISIT MOD MDM: CPT

## 2018-04-25 PROCEDURE — 96375 TX/PRO/DX INJ NEW DRUG ADDON: CPT

## 2018-04-25 PROCEDURE — 85025 COMPLETE CBC W/AUTO DIFF WBC: CPT | Performed by: EMERGENCY MEDICINE

## 2018-04-25 PROCEDURE — 87081 CULTURE SCREEN ONLY: CPT | Performed by: EMERGENCY MEDICINE

## 2018-04-25 PROCEDURE — 96361 HYDRATE IV INFUSION ADD-ON: CPT

## 2018-04-25 PROCEDURE — 74011000250 HC RX REV CODE- 250: Performed by: EMERGENCY MEDICINE

## 2018-04-25 PROCEDURE — 96374 THER/PROPH/DIAG INJ IV PUSH: CPT

## 2018-04-25 PROCEDURE — 80053 COMPREHEN METABOLIC PANEL: CPT | Performed by: EMERGENCY MEDICINE

## 2018-04-25 PROCEDURE — 94640 AIRWAY INHALATION TREATMENT: CPT

## 2018-04-25 PROCEDURE — 82550 ASSAY OF CK (CPK): CPT | Performed by: EMERGENCY MEDICINE

## 2018-04-25 PROCEDURE — 83605 ASSAY OF LACTIC ACID: CPT

## 2018-04-25 PROCEDURE — 71046 X-RAY EXAM CHEST 2 VIEWS: CPT

## 2018-04-25 PROCEDURE — 93005 ELECTROCARDIOGRAM TRACING: CPT

## 2018-04-25 PROCEDURE — 74011250636 HC RX REV CODE- 250/636: Performed by: EMERGENCY MEDICINE

## 2018-04-25 RX ORDER — AZITHROMYCIN 250 MG/1
TABLET, FILM COATED ORAL
Qty: 6 TAB | Refills: 0 | Status: SHIPPED | OUTPATIENT
Start: 2018-04-25 | End: 2018-04-30

## 2018-04-25 RX ORDER — IPRATROPIUM BROMIDE AND ALBUTEROL SULFATE 2.5; .5 MG/3ML; MG/3ML
3 SOLUTION RESPIRATORY (INHALATION)
Status: COMPLETED | OUTPATIENT
Start: 2018-04-25 | End: 2018-04-25

## 2018-04-25 RX ORDER — ONDANSETRON 2 MG/ML
4 INJECTION INTRAMUSCULAR; INTRAVENOUS
Status: COMPLETED | OUTPATIENT
Start: 2018-04-25 | End: 2018-04-25

## 2018-04-25 RX ORDER — IPRATROPIUM BROMIDE AND ALBUTEROL SULFATE 2.5; .5 MG/3ML; MG/3ML
3 SOLUTION RESPIRATORY (INHALATION) ONCE
Status: COMPLETED | OUTPATIENT
Start: 2018-04-25 | End: 2018-04-25

## 2018-04-25 RX ADMIN — IPRATROPIUM BROMIDE AND ALBUTEROL SULFATE 3 ML: .5; 3 SOLUTION RESPIRATORY (INHALATION) at 10:27

## 2018-04-25 RX ADMIN — METHYLPREDNISOLONE SODIUM SUCCINATE 125 MG: 125 INJECTION, POWDER, FOR SOLUTION INTRAMUSCULAR; INTRAVENOUS at 10:27

## 2018-04-25 RX ADMIN — ONDANSETRON 4 MG: 2 INJECTION INTRAMUSCULAR; INTRAVENOUS at 10:27

## 2018-04-25 RX ADMIN — SODIUM CHLORIDE 1000 ML: 900 INJECTION, SOLUTION INTRAVENOUS at 10:28

## 2018-04-25 NOTE — ED TRIAGE NOTES
The patient presents for evaluation of shortness of breath that began last night and intermittent chest pain that began \"a few days ago. \"  States, \"I have asthma. \"

## 2018-04-25 NOTE — DISCHARGE INSTRUCTIONS

## 2018-04-25 NOTE — ED NOTES
Provider notified of patient heart rate and states patient can still be discharged. Pt just completed breathing tx.

## 2018-04-25 NOTE — ED PROVIDER NOTES
EMERGENCY DEPARTMENT HISTORY AND PHYSICAL EXAM    9:46 AM      Date: 4/25/2018  Patient Name: Dwight Doyle    History of Presenting Illness     Chief Complaint   Patient presents with    Shortness of Breath         History Provided By: Patient    Chief Complaint: SOB  Duration:  Last night, worsened this morning  Timing:  Worsening  Location: Respiratory  Quality: N/A  Severity: Not reported  Modifying Factors: Inhaler and nebulizer, without improvement  Associated Symptoms: denies any other associated signs or symptoms      Additional History (Context): Dwight Doyle is a 50 y.o. female with PMHx of asthma, hiatal hernia, TIA, fibromyalgia, PE and DVT who presents to the ED with c/o SOB since last night, worsened this morning. Patient reports she was diagnosed with laryngitis 2 weeks ago with associated symptoms of a cough and swollen tonsils. Admits her symptoms have not improved, reports using inhaler and nebulizer for SOB last night, without improvement. Associated symptoms include 99.9 degree fever, decreased appetite. Denies nausea, vomiting or diarrhea. Reports hx of 3x PE, 1 DVT, denies use of blood thinners. Denies recent long travel. Denies use of birth control. Denies recent trauma to BLE. Reports PSHx of hysterectomy. Admits she quit smoking 3 years ago. No other symptoms or concerns were expressed. PCP: Simone Farfan MD    Current Facility-Administered Medications   Medication Dose Route Frequency Provider Last Rate Last Dose    sodium chloride 0.9 % bolus infusion 1,000 mL  1,000 mL IntraVENous ONCE Ricklinn Sabas Solano DO   Stopped at 04/25/18 1128     Current Outpatient Prescriptions   Medication Sig Dispense Refill    azithromycin (ZITHROMAX Z-CALIN) 250 mg tablet Take two tablets on day one then one tablet daily for the next 4 days. 6 Tab 0    ondansetron (ZOFRAN ODT) 4 mg disintegrating tablet Take 1 Tab by mouth every eight (8) hours as needed for Nausea.  10 Tab 0    Benzocaine-Menthol (CHLORASEPTIC SORE THROAT) 6-10 mg lozg 1 Lozenge by Mucous Membrane route as needed. 18 Lozenge 0    diphenhydrAMINE (BENADRYL) 25 mg capsule Take 25 mg by mouth every six (6) hours as needed.  diclofenac (VOLTAREN) 1 % gel Apply  to affected area four (4) times daily.  melatonin 10 mg tab Take  by mouth.  EPINEPHrine (EPIPEN) 0.3 mg/0.3 mL injection 0.3 mg by IntraMUSCular route once as needed.  [START ON 2018] LORazepam (ATIVAN) 1 mg tablet Take 1 Tab by mouth two (2) times a day. Max Daily Amount: 2 mg. 60 Tab 0    fluticasone-salmeterol (ADVAIR HFA) 230-21 mcg/actuation inhaler Take 2 Puffs by inhalation two (2) times a day.  albuterol (PROVENTIL HFA, VENTOLIN HFA, PROAIR HFA) 90 mcg/actuation inhaler Take 1 Puff by inhalation.  beclomethasone (QVAR) 80 mcg/actuation aero Take 1 Puff by inhalation two (2) times a day. Past History     Past Medical History:  Past Medical History:   Diagnosis Date    Asthma     Bipolar 1 disorder ([address]     Hiatal hernia        Past Surgical History:  Past Surgical History:   Procedure Laterality Date    HX  SECTION      HX HYSTERECTOMY         Family History:  Family History   Problem Relation Age of Onset    Thyroid Disease Mother     Diabetes Mother     Heart Attack Mother        Social History:  Social History   Substance Use Topics    Smoking status: Former Smoker    Smokeless tobacco: Former User    Alcohol use 0.6 oz/week     1 Shots of liquor per week       Allergies: Allergies   Allergen Reactions    Pcn [Penicillins] Anaphylaxis    Strawberry Anaphylaxis    Sulfur Anaphylaxis    Adhesive Tape-Silicones Hives    Depakote [Divalproex] Other (comments)     Paralysis      Thorazine [Chlorpromazine] Other (comments)     Not sure      Toradol [Ketorolac] Seizures    Tramadol Seizures         Review of Systems       Review of Systems   Constitutional: Negative.   Negative for chills, diaphoresis and fever. HENT: Negative. Negative for congestion, rhinorrhea and sore throat. Eyes: Negative. Negative for pain, discharge and redness. Respiratory: Positive for shortness of breath. Negative for cough, chest tightness and wheezing. Cardiovascular: Negative. Negative for chest pain. Gastrointestinal: Negative. Negative for abdominal pain, constipation, diarrhea, nausea and vomiting. Genitourinary: Negative. Negative for dysuria, flank pain, frequency, hematuria and urgency. Musculoskeletal: Negative. Negative for back pain and neck pain. Skin: Negative. Negative for rash. Neurological: Negative. Negative for syncope, weakness, numbness and headaches. Psychiatric/Behavioral: Negative. All other systems reviewed and are negative. Physical Exam     Visit Vitals    /71    Pulse (!) 115    Temp 98.4 °F (36.9 °C)    Resp 24    Ht 4' 10\" (1.473 m)    Wt 63.5 kg (140 lb)    SpO2 99%    BMI 29.26 kg/m2         Physical Exam   Constitutional: She appears well-developed and well-nourished. Non-toxic appearance. She does not have a sickly appearance. She does not appear ill. No distress. HENT:   Head: Normocephalic and atraumatic. Mouth/Throat: Oropharynx is clear and moist. No oropharyngeal exudate. Eyes: Conjunctivae and EOM are normal. Pupils are equal, round, and reactive to light. No scleral icterus. Neck: Normal range of motion. Neck supple. No hepatojugular reflux and no JVD present. No tracheal deviation present. No thyromegaly present. Cardiovascular: Regular rhythm, S1 normal, S2 normal, normal heart sounds, intact distal pulses and normal pulses. Tachycardia present. Exam reveals no gallop, no S3 and no S4. No murmur heard. Pulses:       Radial pulses are 2+ on the right side, and 2+ on the left side. Dorsalis pedis pulses are 2+ on the right side, and 2+ on the left side.    Pulmonary/Chest: Breath sounds normal. Tachypnea noted. She is in respiratory distress. She has no decreased breath sounds. She has no wheezes. She has no rhonchi. She has no rales. Abdominal: Soft. Normal appearance and bowel sounds are normal. She exhibits no distension and no mass. There is no hepatosplenomegaly. There is no tenderness. There is no rigidity, no rebound, no guarding, no CVA tenderness, no tenderness at McBurney's point and negative Garcia's sign. Musculoskeletal: Normal range of motion. Strength 4/5 throughout    Lymphadenopathy:        Head (right side): No submental, no submandibular, no preauricular and no occipital adenopathy present. Head (left side): No submental, no submandibular, no preauricular and no occipital adenopathy present. She has no cervical adenopathy. Right: No supraclavicular adenopathy present. Left: No supraclavicular adenopathy present. Neurological: She is alert. She has normal strength and normal reflexes. She is not disoriented. No cranial nerve deficit or sensory deficit. Coordination and gait normal. GCS eye subscore is 4. GCS verbal subscore is 5. GCS motor subscore is 6. Grossly intact    Skin: Skin is warm, dry and intact. No rash noted. She is not diaphoretic. Psychiatric: She has a normal mood and affect. Her speech is normal and behavior is normal. Judgment and thought content normal. Cognition and memory are normal.   Nursing note and vitals reviewed.         Diagnostic Study Results     Labs -  Recent Results (from the past 12 hour(s))   STREP THROAT SCREEN    Collection Time: 04/25/18  9:35 AM   Result Value Ref Range    Special Requests: NO SPECIAL REQUESTS      Strep Screen NEGATIVE       Culture result: PENDING    EKG, 12 LEAD, INITIAL    Collection Time: 04/25/18  9:38 AM   Result Value Ref Range    Ventricular Rate 111 BPM    Atrial Rate 111 BPM    P-R Interval 116 ms    QRS Duration 74 ms    Q-T Interval 322 ms    QTC Calculation (Bezet) 437 ms Calculated P Axis 63 degrees    Calculated R Axis 23 degrees    Calculated T Axis 14 degrees    Diagnosis       Sinus tachycardia  Otherwise normal ECG  No previous ECGs available  Confirmed by Asa Coad ([id number]) on 4/25/2018 10:34:20 AM     CBC WITH AUTOMATED DIFF    Collection Time: 04/25/18  9:53 AM   Result Value Ref Range    WBC 6.1 4.6 - 13.2 K/uL    RBC 4.98 4.20 - 5.30 M/uL    HGB 13.2 12.0 - 16.0 g/dL    HCT 40.0 35.0 - 45.0 %    MCV 80.3 74.0 - 97.0 FL    MCH 26.5 24.0 - 34.0 PG    MCHC 33.0 31.0 - 37.0 g/dL    RDW 14.8 (H) 11.6 - 14.5 %    PLATELET 079 284 - 773 K/uL    MPV 10.1 9.2 - 11.8 FL    NEUTROPHILS 65 40 - 73 %    LYMPHOCYTES 16 (L) 21 - 52 %    MONOCYTES 8 3 - 10 %    EOSINOPHILS 11 (H) 0 - 5 %    BASOPHILS 0 0 - 2 %    ABS. NEUTROPHILS 3.9 1.8 - 8.0 K/UL    ABS. LYMPHOCYTES 1.0 0.9 - 3.6 K/UL    ABS. MONOCYTES 0.5 0.05 - 1.2 K/UL    ABS. EOSINOPHILS 0.7 (H) 0.0 - 0.4 K/UL    ABS. BASOPHILS 0.0 0.0 - 0.06 K/UL    DF AUTOMATED     METABOLIC PANEL, COMPREHENSIVE    Collection Time: 04/25/18  9:53 AM   Result Value Ref Range    Sodium 142 136 - 145 mmol/L    Potassium 3.4 (L) 3.5 - 5.5 mmol/L    Chloride 109 (H) 100 - 108 mmol/L    CO2 26 21 - 32 mmol/L    Anion gap 7 3.0 - 18 mmol/L    Glucose 110 (H) 74 - 99 mg/dL    BUN 8 7.0 - 18 MG/DL    Creatinine 0.73 0.6 - 1.3 MG/DL    BUN/Creatinine ratio 11 (L) 12 - 20      GFR est AA >60 >60 ml/min/1.73m2    GFR est non-AA >60 >60 ml/min/1.73m2    Calcium 8.6 8.5 - 10.1 MG/DL    Bilirubin, total 0.4 0.2 - 1.0 MG/DL    ALT (SGPT) 29 13 - 56 U/L    AST (SGOT) 19 15 - 37 U/L    Alk.  phosphatase 111 45 - 117 U/L    Protein, total 6.9 6.4 - 8.2 g/dL    Albumin 3.6 3.4 - 5.0 g/dL    Globulin 3.3 2.0 - 4.0 g/dL    A-G Ratio 1.1 0.8 - 1.7     CARDIAC PANEL,(CK, CKMB & TROPONIN)    Collection Time: 04/25/18  9:53 AM   Result Value Ref Range     26 - 192 U/L    CK - MB <1.0 <3.6 ng/ml    CK-MB Index  0.0 - 4.0 %     CALCULATION NOT PERFORMED WHEN RESULT IS BELOW LINEAR LIMIT    Troponin-I, Qt. <0.02 0.0 - 0.045 NG/ML   POC LACTIC ACID    Collection Time: 04/25/18 10:00 AM   Result Value Ref Range    Lactic Acid (POC) 1.1 0.4 - 2.0 mmol/L       Radiologic Studies -   XR CHEST PA LAT   Final Result            Medical Decision Making   I am the first provider for this patient. I reviewed the vital signs, available nursing notes, past medical history, past surgical history, family history and social history. Vital Signs-Reviewed the patient's vital signs. Records Reviewed: Nursing Notes and Old Medical Records (Time of Review: 9:46 AM)    ED Course: Progress Notes, Reevaluation, and Consults:  Labs essentially normal, cardiac enzymes negative, lactic acid normal at 1.1, rapid strep negative. Chest X-Ray showed Subtle focal opacity either in the lingula or the left lower lobe of possible small infiltrate or subsegmental atelectasis or scarring. Would recommend follow-up chest x-ray in 6-8 weeks to document complete resolution to exclude the possibility of a subtle underlying parenchymal mass. EKG showed ST with rate of 111 bpm. With no ST elevations or depression and non specific T wave changes. 10:38 AM 4/25/2018    Provider Notes (Medical Decision Making):  MDM  Number of Diagnoses or Management Options  Community acquired pneumonia of left lung, unspecified part of lung:   Diagnosis management comments: Shortness of breath etiologies include chronic obstructive pulmonary disease (COPD), acute asthma exacerbation, congestive heart failure, pneumonia, acute bronchitis, pulmonary embolism, upper respiratory infection, cardiac event to include acute coronary syndrome, acute myocardial infarction or a combination of the above (ex URI on top of COPD thus causing respiratory distress). Diagnosis       I have reassessed the patient. Patient will be prescribed Azithromycin. Patient was discharged in stable condition.   Patient is to return to emergency department if any new or worsening condition. Clinical Impression:   1. Community acquired pneumonia of left lung, unspecified part of lung        Disposition: Discharge. Follow-up Information     Follow up With Details Comments Contact Stone Cade MD In 2 days  [address] [facility] [address]  [phone]      [facility] EMERGENCY DEPT  As needed, If symptoms worsen [address] [place] [id number]  [phone]           _______________________________    Attestations:  Scribe Attestation     Angella Monetjo acting as a scribe for and in the presence of Yesika Cota DO      April 25, 2018 at 9:46 AM       Provider Attestation:      I personally performed the services described in the documentation, reviewed the documentation, as recorded by the scribe in my presence, and it accurately and completely records my words and actions.  April 25, 2018 at 9:46 AM - Issa Solano DO    _______________________________

## 2018-04-25 NOTE — ED NOTES
Received patient, patient changed into gown independently. Pt with dyspnea on exertion and at rest. Pt connected to monitoring devices. Provider at bedside.

## 2018-04-26 LAB
B-HEM STREP THROAT QL CULT: NEGATIVE
BACTERIA SPEC CULT: NORMAL
SERVICE CMNT-IMP: NORMAL

## 2018-04-27 ENCOUNTER — APPOINTMENT (OUTPATIENT)
Dept: GENERAL RADIOLOGY | Age: 48
DRG: 194 | End: 2018-04-27
Attending: EMERGENCY MEDICINE
Payer: MEDICARE

## 2018-04-27 ENCOUNTER — HOSPITAL ENCOUNTER (INPATIENT)
Age: 48
LOS: 3 days | Discharge: HOME | DRG: 194 | End: 2018-04-30
Attending: EMERGENCY MEDICINE | Admitting: INTERNAL MEDICINE
Payer: MEDICARE

## 2018-04-27 ENCOUNTER — APPOINTMENT (OUTPATIENT)
Dept: CT IMAGING | Age: 48
DRG: 194 | End: 2018-04-27
Attending: EMERGENCY MEDICINE
Payer: MEDICARE

## 2018-04-27 DIAGNOSIS — J45.41 MODERATE PERSISTENT ASTHMA WITH ACUTE EXACERBATION: Primary | ICD-10-CM

## 2018-04-27 DIAGNOSIS — J18.9 PNEUMONIA OF LEFT LOWER LOBE DUE TO INFECTIOUS ORGANISM: ICD-10-CM

## 2018-04-27 PROBLEM — J45.901 ACUTE ASTHMA EXACERBATION: Status: ACTIVE | Noted: 2018-04-27

## 2018-04-27 LAB
ANION GAP SERPL CALC-SCNC: 5 MMOL/L (ref 3–18)
ATRIAL RATE: 103 BPM
BASOPHILS # BLD: 0 K/UL (ref 0–0.06)
BASOPHILS NFR BLD: 0 % (ref 0–2)
BUN SERPL-MCNC: 6 MG/DL (ref 7–18)
BUN/CREAT SERPL: 8 (ref 12–20)
CALCIUM SERPL-MCNC: 8.3 MG/DL (ref 8.5–10.1)
CALCULATED P AXIS, ECG09: 59 DEGREES
CALCULATED R AXIS, ECG10: 21 DEGREES
CALCULATED T AXIS, ECG11: 18 DEGREES
CHLORIDE SERPL-SCNC: 108 MMOL/L (ref 100–108)
CO2 SERPL-SCNC: 29 MMOL/L (ref 21–32)
CREAT SERPL-MCNC: 0.73 MG/DL (ref 0.6–1.3)
D DIMER PPP FEU-MCNC: 0.67 UG/ML(FEU)
DIAGNOSIS, 93000: NORMAL
DIFFERENTIAL METHOD BLD: ABNORMAL
EOSINOPHIL # BLD: 0.1 K/UL (ref 0–0.4)
EOSINOPHIL NFR BLD: 2 % (ref 0–5)
ERYTHROCYTE [DISTWIDTH] IN BLOOD BY AUTOMATED COUNT: 15.3 % (ref 11.6–14.5)
GLUCOSE SERPL-MCNC: 92 MG/DL (ref 74–99)
HCT VFR BLD AUTO: 39.2 % (ref 35–45)
HGB BLD-MCNC: 12.7 G/DL (ref 12–16)
LYMPHOCYTES # BLD: 1.3 K/UL (ref 0.9–3.6)
LYMPHOCYTES NFR BLD: 25 % (ref 21–52)
MCH RBC QN AUTO: 26.3 PG (ref 24–34)
MCHC RBC AUTO-ENTMCNC: 32.4 G/DL (ref 31–37)
MCV RBC AUTO: 81.3 FL (ref 74–97)
MONOCYTES # BLD: 0.6 K/UL (ref 0.05–1.2)
MONOCYTES NFR BLD: 11 % (ref 3–10)
NEUTS SEG # BLD: 3.3 K/UL (ref 1.8–8)
NEUTS SEG NFR BLD: 62 % (ref 40–73)
P-R INTERVAL, ECG05: 112 MS
PLATELET # BLD AUTO: 317 K/UL (ref 135–420)
PMV BLD AUTO: 10.4 FL (ref 9.2–11.8)
POTASSIUM SERPL-SCNC: 3.4 MMOL/L (ref 3.5–5.5)
Q-T INTERVAL, ECG07: 326 MS
QRS DURATION, ECG06: 84 MS
QTC CALCULATION (BEZET), ECG08: 427 MS
RBC # BLD AUTO: 4.82 M/UL (ref 4.2–5.3)
SODIUM SERPL-SCNC: 142 MMOL/L (ref 136–145)
VENTRICULAR RATE, ECG03: 103 BPM
WBC # BLD AUTO: 5.4 K/UL (ref 4.6–13.2)

## 2018-04-27 PROCEDURE — 71045 X-RAY EXAM CHEST 1 VIEW: CPT

## 2018-04-27 PROCEDURE — 96375 TX/PRO/DX INJ NEW DRUG ADDON: CPT

## 2018-04-27 PROCEDURE — 74011636320 HC RX REV CODE- 636/320: Performed by: EMERGENCY MEDICINE

## 2018-04-27 PROCEDURE — 65270000029 HC RM PRIVATE

## 2018-04-27 PROCEDURE — 96374 THER/PROPH/DIAG INJ IV PUSH: CPT

## 2018-04-27 PROCEDURE — 85025 COMPLETE CBC W/AUTO DIFF WBC: CPT | Performed by: EMERGENCY MEDICINE

## 2018-04-27 PROCEDURE — 94640 AIRWAY INHALATION TREATMENT: CPT

## 2018-04-27 PROCEDURE — 85379 FIBRIN DEGRADATION QUANT: CPT | Performed by: EMERGENCY MEDICINE

## 2018-04-27 PROCEDURE — 74011000250 HC RX REV CODE- 250: Performed by: INTERNAL MEDICINE

## 2018-04-27 PROCEDURE — 80048 BASIC METABOLIC PNL TOTAL CA: CPT | Performed by: EMERGENCY MEDICINE

## 2018-04-27 PROCEDURE — 77030029684 HC NEB SM VOL KT MONA -A

## 2018-04-27 PROCEDURE — 99285 EMERGENCY DEPT VISIT HI MDM: CPT

## 2018-04-27 PROCEDURE — 74011250637 HC RX REV CODE- 250/637: Performed by: EMERGENCY MEDICINE

## 2018-04-27 PROCEDURE — 74011250636 HC RX REV CODE- 250/636: Performed by: EMERGENCY MEDICINE

## 2018-04-27 PROCEDURE — 74011000250 HC RX REV CODE- 250: Performed by: EMERGENCY MEDICINE

## 2018-04-27 PROCEDURE — 71275 CT ANGIOGRAPHY CHEST: CPT

## 2018-04-27 PROCEDURE — 87040 BLOOD CULTURE FOR BACTERIA: CPT | Performed by: EMERGENCY MEDICINE

## 2018-04-27 PROCEDURE — 74011250636 HC RX REV CODE- 250/636: Performed by: INTERNAL MEDICINE

## 2018-04-27 PROCEDURE — 93005 ELECTROCARDIOGRAM TRACING: CPT

## 2018-04-27 RX ORDER — SODIUM CHLORIDE 0.9 % (FLUSH) 0.9 %
5-10 SYRINGE (ML) INJECTION EVERY 8 HOURS
Status: DISCONTINUED | OUTPATIENT
Start: 2018-04-27 | End: 2018-04-30 | Stop reason: HOSPADM

## 2018-04-27 RX ORDER — DIPHENHYDRAMINE HCL 25 MG
25 CAPSULE ORAL
Status: DISCONTINUED | OUTPATIENT
Start: 2018-04-27 | End: 2018-04-30 | Stop reason: HOSPADM

## 2018-04-27 RX ORDER — ACETAMINOPHEN 325 MG/1
650 TABLET ORAL
Status: DISCONTINUED | OUTPATIENT
Start: 2018-04-27 | End: 2018-04-30 | Stop reason: HOSPADM

## 2018-04-27 RX ORDER — IPRATROPIUM BROMIDE AND ALBUTEROL SULFATE 2.5; .5 MG/3ML; MG/3ML
3 SOLUTION RESPIRATORY (INHALATION)
Status: COMPLETED | OUTPATIENT
Start: 2018-04-27 | End: 2018-04-27

## 2018-04-27 RX ORDER — LEVOFLOXACIN 5 MG/ML
750 INJECTION, SOLUTION INTRAVENOUS EVERY 24 HOURS
Status: DISCONTINUED | OUTPATIENT
Start: 2018-04-28 | End: 2018-04-30 | Stop reason: HOSPADM

## 2018-04-27 RX ORDER — ENOXAPARIN SODIUM 100 MG/ML
40 INJECTION SUBCUTANEOUS EVERY 24 HOURS
Status: DISCONTINUED | OUTPATIENT
Start: 2018-04-27 | End: 2018-04-30 | Stop reason: HOSPADM

## 2018-04-27 RX ORDER — ALBUTEROL SULFATE 0.83 MG/ML
2.5 SOLUTION RESPIRATORY (INHALATION)
Status: DISCONTINUED | OUTPATIENT
Start: 2018-04-27 | End: 2018-04-30 | Stop reason: HOSPADM

## 2018-04-27 RX ORDER — ONDANSETRON 2 MG/ML
4 INJECTION INTRAMUSCULAR; INTRAVENOUS
Status: COMPLETED | OUTPATIENT
Start: 2018-04-27 | End: 2018-04-27

## 2018-04-27 RX ORDER — LEVOFLOXACIN 500 MG/1
500 TABLET, FILM COATED ORAL
Status: COMPLETED | OUTPATIENT
Start: 2018-04-27 | End: 2018-04-27

## 2018-04-27 RX ORDER — SODIUM CHLORIDE 0.9 % (FLUSH) 0.9 %
5-10 SYRINGE (ML) INJECTION AS NEEDED
Status: DISCONTINUED | OUTPATIENT
Start: 2018-04-27 | End: 2018-04-30 | Stop reason: HOSPADM

## 2018-04-27 RX ORDER — OXYCODONE AND ACETAMINOPHEN 5; 325 MG/1; MG/1
1 TABLET ORAL
Status: DISCONTINUED | OUTPATIENT
Start: 2018-04-27 | End: 2018-04-30 | Stop reason: HOSPADM

## 2018-04-27 RX ADMIN — IOPAMIDOL 78 ML: 755 INJECTION, SOLUTION INTRAVENOUS at 13:50

## 2018-04-27 RX ADMIN — IPRATROPIUM BROMIDE AND ALBUTEROL SULFATE 3 ML: .5; 3 SOLUTION RESPIRATORY (INHALATION) at 13:02

## 2018-04-27 RX ADMIN — Medication 10 ML: at 23:55

## 2018-04-27 RX ADMIN — ENOXAPARIN SODIUM 40 MG: 40 INJECTION SUBCUTANEOUS at 21:19

## 2018-04-27 RX ADMIN — METHYLPREDNISOLONE SODIUM SUCCINATE 40 MG: 40 INJECTION, POWDER, FOR SOLUTION INTRAMUSCULAR; INTRAVENOUS at 23:55

## 2018-04-27 RX ADMIN — METHYLPREDNISOLONE SODIUM SUCCINATE 125 MG: 125 INJECTION, POWDER, FOR SOLUTION INTRAMUSCULAR; INTRAVENOUS at 13:02

## 2018-04-27 RX ADMIN — ONDANSETRON 4 MG: 2 INJECTION INTRAMUSCULAR; INTRAVENOUS at 14:02

## 2018-04-27 RX ADMIN — ALBUTEROL SULFATE 2.5 MG: 2.5 SOLUTION RESPIRATORY (INHALATION) at 20:43

## 2018-04-27 RX ADMIN — SODIUM CHLORIDE 1000 ML: 900 INJECTION, SOLUTION INTRAVENOUS at 15:05

## 2018-04-27 RX ADMIN — LEVOFLOXACIN 500 MG: 500 TABLET, FILM COATED ORAL at 14:01

## 2018-04-27 NOTE — ED TRIAGE NOTES
Seen for pneumonia 3 days ago. Increase in shortness of breath, cough, chest pain, using oxygen at home.

## 2018-04-27 NOTE — ED NOTES
TRANSFER - OUT REPORT:    Verbal report given to DELL Hightower(name) on [address] [facility],[address]  being transferred to (unit) for routine progression of care       Report consisted of patients Situation, Background, Assessment and   Recommendations(SBAR). Information from the following report(s) SBAR, ED Summary, Intake/Output, MAR and Recent Results was reviewed with the receiving nurse. Opportunity for questions and clarification was provided.       Patient transported with:   O2 @ 4 liters   Patient Belongings  Transport

## 2018-04-27 NOTE — ED PROVIDER NOTES
EMERGENCY DEPARTMENT HISTORY AND PHYSICAL EXAM    12:17 PM      Date: 4/27/2018  Patient Name: Idalmis Jones    History of Presenting Illness     Chief Complaint   Patient presents with    Shortness of Breath         History Provided By: Patient    Chief Complaint: SOB   Duration: 1 Weeks  Timing:  Constant and Worsening  Location: Chest   Quality: N/A  Severity: 10 out of 10  Modifying Factors: None   Associated Symptoms: cough, wheezing and CP      Additional History (Context): Idalmis Jones is a 50 y.o. female with hx of asthma, PNA PE, DVT and bipolar disorder presenting to the ED with c/o constant, worsening SOB that began 1 week ago. Pt was diagnosed with PNA 3 days ago and was prescribed Zithromax. Reports sx are much worse, requiring her to use O2 at home. States she has been hospitalized several times the past couple years for pulmonary issues. Notes she received Xolair in the past for her allergies and asthma with relief. Denies any fever, chills, abdominal pain, nausea, vomiting, diarrhea, urinary sx or changes in medication. Associated sx include wheezing, CP and productive cough with green sputum. Severity is 10/10. States she used home inhaler and breathing treatments with no relief. Pt has not been on blood thinners for about 5 years. Denies smoking, ETOH or drug use. No other sx or complaints given at this time. PCP: Teodoro Villar MD    Current Facility-Administered Medications   Medication Dose Route Frequency Provider Last Rate Last Dose    sodium chloride 0.9 % bolus infusion 1,000 mL  1,000 mL IntraVENous ONCE Anila Ibarra MD         Current Outpatient Prescriptions   Medication Sig Dispense Refill    azithromycin (ZITHROMAX Z-CALIN) 250 mg tablet Take two tablets on day one then one tablet daily for the next 4 days. 6 Tab 0    menthol-sorbitol (THROAT LOZENGES) lozenge Take 1 Lozenge by mouth as needed for Pain.  10 Lozenge 0    ondansetron (ZOFRAN ODT) 4 mg disintegrating tablet Take 1 Tab by mouth every eight (8) hours as needed for Nausea. 10 Tab 0    Benzocaine-Menthol (CHLORASEPTIC SORE THROAT) 6-10 mg lozg 1 Lozenge by Mucous Membrane route as needed. 18 Lozenge 0    diphenhydrAMINE (BENADRYL) 25 mg capsule Take 25 mg by mouth every six (6) hours as needed.  diclofenac (VOLTAREN) 1 % gel Apply  to affected area four (4) times daily.  melatonin 10 mg tab Take  by mouth.  EPINEPHrine (EPIPEN) 0.3 mg/0.3 mL injection 0.3 mg by IntraMUSCular route once as needed.  [START ON 2018] LORazepam (ATIVAN) 1 mg tablet Take 1 Tab by mouth two (2) times a day. Max Daily Amount: 2 mg. 60 Tab 0    fluticasone-salmeterol (ADVAIR HFA) 230-21 mcg/actuation inhaler Take 2 Puffs by inhalation two (2) times a day.  albuterol (PROVENTIL HFA, VENTOLIN HFA, PROAIR HFA) 90 mcg/actuation inhaler Take 1 Puff by inhalation.  beclomethasone (QVAR) 80 mcg/actuation aero Take 1 Puff by inhalation two (2) times a day. Past History     Past Medical History:  Past Medical History:   Diagnosis Date    Asthma     Bipolar 1 disorder ([address]     Hiatal hernia        Past Surgical History:  Past Surgical History:   Procedure Laterality Date    HX  SECTION      HX HYSTERECTOMY         Family History:  Family History   Problem Relation Age of Onset    Thyroid Disease Mother     Diabetes Mother     Heart Attack Mother        Social History:  Social History   Substance Use Topics    Smoking status: Former Smoker    Smokeless tobacco: Former User    Alcohol use 0.6 oz/week     1 Shots of liquor per week       Allergies:   Allergies   Allergen Reactions    Pcn [Penicillins] Anaphylaxis    Strawberry Anaphylaxis    Sulfur Anaphylaxis    Adhesive Tape-Silicones Hives    Depakote [Divalproex] Other (comments)     Paralysis      Thorazine [Chlorpromazine] Other (comments)     Not sure      Toradol [Ketorolac] Seizures    Tramadol Seizures         Review of Systems       Review of Systems   Constitutional: Negative for activity change, appetite change, chills, diaphoresis, fatigue, fever and unexpected weight change. HENT: Negative for congestion, dental problem, drooling, ear discharge, ear pain, facial swelling, hearing loss, mouth sores, nosebleeds, postnasal drip, rhinorrhea, sinus pressure, sneezing, sore throat, tinnitus and trouble swallowing. Eyes: Negative for photophobia, pain, discharge, redness, itching and visual disturbance. Respiratory: Positive for cough, shortness of breath and wheezing. Negative for apnea, choking, chest tightness and stridor. Cardiovascular: Positive for chest pain. Negative for palpitations and leg swelling. Gastrointestinal: Negative for abdominal distention, abdominal pain, anal bleeding, blood in stool, constipation, diarrhea, nausea, rectal pain and vomiting. Endocrine: Negative for cold intolerance, heat intolerance, polydipsia, polyphagia and polyuria. Genitourinary: Negative for decreased urine volume, difficulty urinating, dysuria, enuresis, flank pain, frequency, genital sores, hematuria and urgency. Musculoskeletal: Negative for arthralgias, back pain, gait problem, joint swelling, myalgias, neck pain and neck stiffness. Skin: Negative for color change, pallor, rash and wound. Allergic/Immunologic: Negative for environmental allergies, food allergies and immunocompromised state. Neurological: Negative for dizziness, tremors, seizures, syncope, facial asymmetry, speech difficulty, weakness, light-headedness, numbness and headaches. Hematological: Negative for adenopathy. Does not bruise/bleed easily. Psychiatric/Behavioral: Negative for agitation, behavioral problems, confusion, decreased concentration, dysphoric mood, hallucinations, self-injury, sleep disturbance and suicidal ideas. The patient is not nervous/anxious and is not hyperactive.           Physical Exam Visit Vitals    /61    Pulse 90    Temp 100.2 °F (37.9 °C)    Resp 21    Wt 63.5 kg (140 lb)    SpO2 97%    BMI 29.26 kg/m2         Physical Exam   Constitutional: She is oriented to person, place, and time. She appears well-developed and well-nourished. Alert and appropriate in apparent moderate discomfort due to tachypnea and shortness of breath without signs of impending respiratory failure   HENT:   Head: Normocephalic and atraumatic. Right Ear: External ear normal.   Left Ear: External ear normal.   Mouth/Throat: Oropharynx is clear and moist. No oropharyngeal exudate. Eyes: Conjunctivae and EOM are normal. Pupils are equal, round, and reactive to light. Right eye exhibits no discharge. Left eye exhibits no discharge. No scleral icterus. Neck: Normal range of motion. Neck supple. No JVD present. No tracheal deviation present. No thyromegaly present. Cardiovascular: Normal rate, normal heart sounds and intact distal pulses. Exam reveals no gallop and no friction rub. No murmur heard. tachycardia   Pulmonary/Chest: No stridor. She is in respiratory distress. She has wheezes. She has no rales. She exhibits no tenderness. Patient with increased work of breathing with prolonged expiratory phase, expiratory wheeze, moderately diminished air exchange, no rrhonchi   Abdominal: Soft. Bowel sounds are normal. She exhibits no distension. There is no tenderness. There is no rebound and no guarding. Musculoskeletal: Normal range of motion. She exhibits no edema or tenderness. Lymphadenopathy:     She has no cervical adenopathy. Neurological: She is alert and oriented to person, place, and time. No cranial nerve deficit. Coordination normal.   Skin: Skin is warm. No erythema. Psychiatric: She has a normal mood and affect. Her behavior is normal. Judgment and thought content normal.   Nursing note and vitals reviewed.         Diagnostic Study Results     Labs -  Recent Results (from the past 12 hour(s))   CBC WITH AUTOMATED DIFF    Collection Time: 04/27/18 12:34 PM   Result Value Ref Range    WBC 5.4 4.6 - 13.2 K/uL    RBC 4.82 4.20 - 5.30 M/uL    HGB 12.7 12.0 - 16.0 g/dL    HCT 39.2 35.0 - 45.0 %    MCV 81.3 74.0 - 97.0 FL    MCH 26.3 24.0 - 34.0 PG    MCHC 32.4 31.0 - 37.0 g/dL    RDW 15.3 (H) 11.6 - 14.5 %    PLATELET 820 404 - 546 K/uL    MPV 10.4 9.2 - 11.8 FL    NEUTROPHILS 62 40 - 73 %    LYMPHOCYTES 25 21 - 52 %    MONOCYTES 11 (H) 3 - 10 %    EOSINOPHILS 2 0 - 5 %    BASOPHILS 0 0 - 2 %    ABS. NEUTROPHILS 3.3 1.8 - 8.0 K/UL    ABS. LYMPHOCYTES 1.3 0.9 - 3.6 K/UL    ABS. MONOCYTES 0.6 0.05 - 1.2 K/UL    ABS. EOSINOPHILS 0.1 0.0 - 0.4 K/UL    ABS.  BASOPHILS 0.0 0.0 - 0.06 K/UL    DF AUTOMATED     METABOLIC PANEL, BASIC    Collection Time: 04/27/18 12:34 PM   Result Value Ref Range    Sodium 142 136 - 145 mmol/L    Potassium 3.4 (L) 3.5 - 5.5 mmol/L    Chloride 108 100 - 108 mmol/L    CO2 29 21 - 32 mmol/L    Anion gap 5 3.0 - 18 mmol/L    Glucose 92 74 - 99 mg/dL    BUN 6 (L) 7.0 - 18 MG/DL    Creatinine 0.73 0.6 - 1.3 MG/DL    BUN/Creatinine ratio 8 (L) 12 - 20      GFR est AA >60 >60 ml/min/1.73m2    GFR est non-AA >60 >60 ml/min/1.73m2    Calcium 8.3 (L) 8.5 - 10.1 MG/DL   D DIMER    Collection Time: 04/27/18 12:34 PM   Result Value Ref Range    D DIMER 0.67 (H) <0.46 ug/ml(FEU)   EKG, 12 LEAD, INITIAL    Collection Time: 04/27/18 12:58 PM   Result Value Ref Range    Ventricular Rate 103 BPM    Atrial Rate 103 BPM    P-R Interval 112 ms    QRS Duration 84 ms    Q-T Interval 326 ms    QTC Calculation (Bezet) 427 ms    Calculated P Axis 59 degrees    Calculated R Axis 21 degrees    Calculated T Axis 18 degrees    Diagnosis       Sinus tachycardia  Otherwise normal ECG  When compared with ECG of 25-APR-2018 09:38,  Nonspecific T wave abnormality no longer evident in Lateral leads         Radiologic Studies -   CTA CHEST W OR W WO CONT   Final Result      XR CHEST PORT   Final Result Medical Decision Making   I am the first provider for this patient. I reviewed the vital signs, available nursing notes, past medical history, past surgical history, family history and social history. Vital Signs-Reviewed the patient's vital signs. Pulse Oximetry Analysis -  2L of O2 via NC, stable     Cardiac Monitor:  Rate: 113  Rhythm:  Sinus Tachycardia     EKG: Interpreted by the EP. Time Interpreted: 1258   Rate: 103   Rhythm: Sinus Tachycardia normal axis, normal intervals without acute S-T or T-wave changes or significant interval changes   Interpretation: Normal   Comparison: 4/25/2018    Records Reviewed: Nursing Notes and Old Medical Records (Time of Review: 12:17 PM)    ED Course: Progress Notes, Reevaluation, and Consults: Patient had good improvement in work of breathing and tachycardia with IV fluids and breathing treatments. Coverage broadened to Levaquin due to penicillin allergy. Patient still with some increased work of breathing and hypoxemia with multilobar pneumonia on CTA (no PE), so will admit to medicine. Hospitalist and patient agree with this plan. Provider Notes (Medical Decision Making): Patient with shortness of breath and reported pneumonia. Prior CXR c/w pneumonia but could not rule out neoplasm- recommended CT. Patient also with prior history of DVT- will consider CTA if Ddimer is elevated. Will begin on inhaled beta agonists and anticholinergics as well as supplemental oxygen and steroids. Lactic acid not reliable in light of albuterol treatment but not hypotensive. Will not initiate sepsis protocol if labs are reassuring and patient responds to IV fluids. For Hospitalized Patients:    1. Hospitalization Decision Time:  The decision to hospitalize the patient was made by Dr. Goran Barrera at 1500 on 4/27/2018    2.  Aspirin: Aspirin was not given because the patient did not present with a stroke at the time of their Emergency Department evaluation    Diagnosis     Clinical Impression:   1. Moderate persistent asthma with acute exacerbation    2. Pneumonia of left lower lobe due to infectious organism [facility])        Disposition: Admission    Follow-up Information     None           Patient's Medications   Start Taking    No medications on file   Continue Taking    ALBUTEROL (PROVENTIL HFA, VENTOLIN HFA, PROAIR HFA) 90 MCG/ACTUATION INHALER    Take 1 Puff by inhalation. AZITHROMYCIN (ZITHROMAX Z-CALIN) 250 MG TABLET    Take two tablets on day one then one tablet daily for the next 4 days. BECLOMETHASONE (QVAR) 80 MCG/ACTUATION AERO    Take 1 Puff by inhalation two (2) times a day. BENZOCAINE-MENTHOL (CHLORASEPTIC SORE THROAT) 6-10 MG LOZG    1 Lozenge by Mucous Membrane route as needed. DICLOFENAC (VOLTAREN) 1 % GEL    Apply  to affected area four (4) times daily. DIPHENHYDRAMINE (BENADRYL) 25 MG CAPSULE    Take 25 mg by mouth every six (6) hours as needed. EPINEPHRINE (EPIPEN) 0.3 MG/0.3 ML INJECTION    0.3 mg by IntraMUSCular route once as needed. FLUTICASONE-SALMETEROL (ADVAIR HFA) 230-21 MCG/ACTUATION INHALER    Take 2 Puffs by inhalation two (2) times a day. LORAZEPAM (ATIVAN) 1 MG TABLET    Take 1 Tab by mouth two (2) times a day. Max Daily Amount: 2 mg. MELATONIN 10 MG TAB    Take  by mouth. MENTHOL-SORBITOL (THROAT LOZENGES) LOZENGE    Take 1 Lozenge by mouth as needed for Pain. ONDANSETRON (ZOFRAN ODT) 4 MG DISINTEGRATING TABLET    Take 1 Tab by mouth every eight (8) hours as needed for Nausea.    These Medications have changed    No medications on file   Stop Taking    No medications on file     _______________________________    Attestations:  Scribe Attestation     Haley Gonzales acting as a scribe for and in the presence of Bonifacio Wayne MD      April 27, 2018 at 12:17 PM       Provider Attestation:      I personally performed the services described in the documentation, reviewed the documentation, as recorded by the scribe in my presence, and it accurately and completely records my words and actions.  April 27, 2018 at 12:17 PM - Cece Ritter MD    _______________________________

## 2018-04-27 NOTE — IP AVS SNAPSHOT
[address] Patient: Jostin Dela Cruz MRN: [id number] EEB:[birth year] About your hospitalization You were admitted on:  April 27, 2018 You last received care in the:  [facility] [address] You were discharged on:  April 30, 2018 Why you were hospitalized Your primary diagnosis was:  Not on File Your diagnoses also included:  Acute Asthma Exacerbation Follow-up Information Follow up With Details Comments Contact Info Gerardo Huynh MD On 5/3/2018 @2:00PM Alerts. [address] [facility] 
[phone] Your Scheduled Appointments Wednesday May 02, 2018  4:00 PM EDT New Patient with Jazmin Huggins MD  
[facility] [address] [place] [address]  
[phone] Thursday May 03, 2018  2:00 PM EDT TRANSITIONAL CARE MANAGEMENT with MD Jacki Figueroa Dr ([facility]) Alerts. [address] [place] [address]  
[phone] Discharge Orders None A check marleny indicates which time of day the medication should be taken. My Medications START taking these medications Instructions Each Dose to Equal  
 Morning Noon Evening Bedtime  
 acetaminophen 325 mg tablet Commonly known as:  TYLENOL Your last dose was: Your next dose is: Take 2 Tabs by mouth every four (4) hours as needed. 650 mg  
    
   
   
   
  
 guaiFENesin-codeine 100-10 mg/5 mL solution Commonly known as:  ROBITUSSIN AC Your last dose was: Your next dose is: Take 5 mL by mouth every four (4) hours as needed for Cough. Max Daily Amount: 30 mL. 5 mL  
    
   
   
   
  
 levoFLOXacin 750 mg tablet Commonly known as:  Jhon Bitter  
 Start taking on:  5/1/2018 Your last dose was: Your next dose is: Take 1 Tab by mouth daily for 4 days. 750 mg  
    
   
   
   
  
 predniSONE 10 mg tablet Commonly known as:  Kim Salgado Your last dose was: Your next dose is:    
   
   
 Prednisone 10mg tabs: p.o. 4 tabs daily for 2 days then drop to  3 tabs daily for 3 days then drop to  2 tabs daily for 3 days then drop to  1 tab daily for 3 days then stop. Dispense 29 tabs CONTINUE taking these medications Instructions Each Dose to Equal  
 Morning Noon Evening Bedtime  
 albuterol 90 mcg/actuation inhaler Commonly known as:  PROVENTIL HFA, VENTOLIN HFA, PROAIR HFA Your last dose was: Your next dose is: Take 1 Puff by inhalation. 1 Puff BENADRYL 25 mg capsule Generic drug:  diphenhydrAMINE Your last dose was: Your next dose is: Take 25 mg by mouth every six (6) hours as needed. 25 mg Benzocaine-Menthol 6-10 mg Lozg Commonly known as:  CHLORASEPTIC SORE THROAT Your last dose was: Your next dose is:    
   
   
 1 Lozenge by Mucous Membrane route as needed. 1 Lozenge EPIPEN 0.3 mg/0.3 mL injection Generic drug:  EPINEPHrine Your last dose was: Your next dose is: 0.3 mg by IntraMUSCular route once as needed. 0.3 mg  
    
   
   
   
  
 fluticasone-salmeterol 230-21 mcg/actuation inhaler Commonly known as:  ADVAIR HFA Your last dose was: Your next dose is: Take 2 Puffs by inhalation two (2) times a day. 2 Puff LORazepam 1 mg tablet Commonly known as:  ATIVAN Start taking on:  6/4/2018 Your last dose was: Your next dose is: Take 1 Tab by mouth two (2) times a day. Max Daily Amount: 2 mg.  
 1 mg melatonin 10 mg Tab Your last dose was: Your next dose is: Take  by mouth.  
     
   
   
   
  
 menthol-sorbitol lozenge Commonly known as:  THROAT LOZENGES Your last dose was: Your next dose is: Take 1 Lozenge by mouth as needed for Pain. 1 Lozenge  
    
   
   
   
  
 ondansetron 4 mg disintegrating tablet Commonly known as:  ZOFRAN ODT Your last dose was: Your next dose is: Take 1 Tab by mouth every eight (8) hours as needed for Nausea. 4 mg QVAR 80 mcg/actuation [employer] Generic drug:  beclomethasone Your last dose was: Your next dose is: Take 1 Puff by inhalation two (2) times a day. 1 Puff VOLTAREN 1 % Gel Generic drug:  diclofenac Your last dose was: Your next dose is:    
   
   
 Apply  to affected area four (4) times daily. STOP taking these medications   
 azithromycin 250 mg tablet Commonly known as:  Lolita Conner Where to Get Your Medications Information on where to get these meds will be given to you by the nurse or doctor. ! Ask your nurse or doctor about these medications  
  acetaminophen 325 mg tablet Benzocaine-Menthol 6-10 mg Lozg  
 guaiFENesin-codeine 100-10 mg/5 mL solution  
 levoFLOXacin 750 mg tablet  
 predniSONE 10 mg tablet Opioid Education Prescription Opioids: What You Need to Know: 
 
Prescription opioids can be used to help relieve moderate-to-severe pain and are often prescribed following a surgery or injury, or for certain health conditions. These medications can be an important part of treatment but also come with serious risks. Opioids are strong pain medicines. Examples include hydrocodone, oxycodone, fentanyl, and morphine. Heroin is an example of an illegal opioid.   It is important to work with your health care provider to make sure you are getting the safest, most effective care. WHAT ARE THE RISKS AND SIDE EFFECTS OF OPIOID USE? Prescription opioids carry serious risks of addiction and overdose, especially with prolonged use. An opioid overdose, often marked by slow breathing, can cause sudden death. The use of prescription opioids can have a number of side effects as well, even when taken as directed. · Tolerance-meaning you might need to take more of a medication for the same pain relief · Physical dependence-meaning you have symptoms of withdrawal when the medication is stopped. Withdrawal symptoms can include nausea, sweating, chills, diarrhea, stomach cramps, and muscle aches. Withdrawal can last up to several weeks, depending on which drug you took and how long you took it. · Increased sensitivity to pain · Constipation · Nausea, vomiting, and dry mouth · Sleepiness and dizziness · Confusion · Depression · Low levels of testosterone that can result in lower sex drive, energy, and strength · Itching and sweating RISKS ARE GREATER WITH:      
· History of drug misuse, substance use disorder, or overdose · Mental health conditions (such as depression or anxiety) · Sleep apnea · Older age (72 years or older) · Pregnancy Avoid alcohol while taking prescription opioids. Also, unless specifically advised by your health care provider, medications to avoid include: · Benzodiazepines (such as Xanax or Valium) · Muscle relaxants (such as Soma or Flexeril) · Hypnotics (such as Ambien or Lunesta) · Other prescription opioids KNOW YOUR OPTIONS Talk to your health care provider about ways to manage your pain that don't involve prescription opioids. Some of these options may actually work better and have fewer risks and side effects. Options may include: 
· Pain relievers such as acetaminophen, ibuprofen, and naproxen · Some medications that are also used for depression or seizures · Physical therapy and exercise · Counseling to help patients learn how to cope better with triggers of pain and stress. · Application of heat or cold compress · Massage therapy · Relaxation techniques Be Informed Make sure you know the name of your medication, how much and how often to take it, and its potential risks & side effects. IF YOU ARE PRESCRIBED OPIOIDS FOR PAIN: 
· Never take opioids in greater amounts or more often than prescribed. Remember the goal is not to be pain-free but to manage your pain at a tolerable level. · Follow up with your primary care provider to: · Work together to create a plan on how to manage your pain. · Talk about ways to help manage your pain that don't involve prescription opioids. · Talk about any and all concerns and side effects. · Help prevent misuse and abuse. · Never sell or share prescription opioids · Help prevent misuse and abuse. · Store prescription opioids in a secure place and out of reach of others (this may include visitors, children, friends, and family). · Safely dispose of unused/unwanted prescription opioids: Find your community drug take-back program or your pharmacy mail-back program, or flush them down the toilet, following guidance from the Food and Drug Administration (www.fda.gov/Drugs/ResourcesForYou). · Visit www.cdc.gov/drugoverdose to learn about the risks of opioid abuse and overdose. · If you believe you may be struggling with addiction, tell your health care provider and ask for guidance or call [address] [place] [address] at [phone]. Discharge Instructions Discharge Instructions Patient: Doll Blizzard MRN: [id number]  [facility]: [id number] YOB: 1970  Age: 50 y.o. Sex: female DOA: 4/27/2018 LOS:  LOS: 3 days   Discharge Date: DIET:  Regular Diet ACTIVITY: Activity as tolerated, no restrictions ADDITIONAL INFORMATION: If you experience any of the following symptoms but not limited to Fever, chills, nausea, vomiting, diarrhea, change in mentation, falling, bleeding, shortness of breath, chest pain, please call your primary care physician or return to the emergency room if you cannot get hold of your doctor:  
 
FOLLOW UP CARE: 
Dr. Christina Nick MD in 5-7 days. Please call and set up an appointment. Quintin Rea NP 
4/30/2018 12:30 PM 
 
 
 
 
 
 
  
  
  
SmartSignal Announcement We are excited to announce that we are making your provider's discharge notes available to you in SmartSignal. You will see these notes when they are completed and signed by the physician that discharged you from your recent hospital stay. If you have any questions or concerns about any information you see in SmartSignal, please call the Health Information Department where you were seen or reach out to your Primary Care Provider for more information about your plan of care. Introducing [facility]! [facility] introduces SmartSignal patient portal. Now you can access parts of your medical record, email your doctor's office, and request medication refills online. 1. In your internet browser, go to [email or web address] 2. Click on the First Time User? Click Here link in the Sign In box. You will see the New Member Sign Up page. 3. Enter your SmartSignal Access Code exactly as it appears below. You will not need to use this code after youve completed the sign-up process. If you do not sign up before the expiration date, you must request a new code. · SmartSignal Access Code: [email or web address] Expires: 6/27/2018  8:09 PM 
 
4. Enter the last four digits of your Social Security Number (xxxx) and Date of Birth (mm/dd/yyyy) as indicated and click Submit. You will be taken to the next sign-up page. 5. Create a Xitronix ID. This will be your Xitronix login ID and cannot be changed, so think of one that is secure and easy to remember. 6. Create a X[employer]ronix password. You can change your password at any time. 7. Enter your Password Reset Question and Answer. This can be used at a later time if you forget your password. 8. Enter your e-mail address. You will receive e-mail notification when new information is available in [address] 9. Click Sign Up. You can now view and download portions of your medical record. 10. Click the Download Summary menu link to download a portable copy of your medical information. If you have questions, please visit the Frequently Asked Questions section of the Xitronix website. Remember, Xitronix is NOT to be used for urgent needs. For medical emergencies, dial 911. Now available from your iPhone and Android! Introducing Jatinder Casanova As a [facility] patient, I wanted to make you aware of our electronic visit tool called Jatinder Casanova. [facility] 24/7 allows you to connect within minutes with a medical provider 24 hours a day, seven days a week via a mobile device or tablet or logging into a secure website from your computer. You can access Jatinder Valdezfin from anywhere in the [place]. A virtual visit might be right for you when you have a simple condition and feel like you just dont want to get out of bed, or cant get away from work for an appointment, when your regular [facility] provider is not available (evenings, weekends or holidays), or when youre out of town and need minor care. Electronic visits cost only $49 and if the [facility] 24/7 provider determines a prescription is needed to treat your condition, one can be electronically transmitted to a nearby pharmacy*. Please take a moment to enroll today if you have not already done so. The enrollment process is free and takes just a few minutes.   To enroll, please download the [facility] [facility] 24/7 ady to your tablet or phone, or visit [email or web address] to enroll on your computer. And, as an [address] [place] [address] patient with a [employer] account, the results of your visits will be scanned into your electronic medical record and your primary care provider will be able to view the scanned results. We urge you to continue to see your regular [employer]  provider for your ongoing medical care. And while your primary care provider may not be the one available when you seek a Mindflash virtual visit, the peace of mind you get from getting a real diagnosis real time can be priceless. For more information on Mindflash, view our Frequently Asked Questions (FAQs) at [email or web address]. Sincerely, 
 
Victoriano Riggs MD 
Chief Medical Officer Harinder Macias *:  certain medications cannot be prescribed via Mindflash Unresulted Labs-Please follow up with your PCP about these lab tests Order Current Status CULTURE, BLOOD Preliminary result CULTURE, BLOOD Preliminary result CULTURE, RESPIRATORY/SPUTUM/BRONCH W GRAM STAIN Preliminary result Providers Seen During Your Hospitalization Provider Specialty Primary office phone Abad Hines MD Emergency Medicine [phone] Elias Parikh MD Internal Medicine [phone] Sabine Welch MD Internal Medicine [phone] Kiana King MD Internal Medicine [phone] Nigel Downs MD [facility] [phone] Your Primary Care Physician (PCP) Primary Care Physician Office Phone Office Fax Dudley Mariano [phone] You are allergic to the following Allergen Reactions Pcn (Penicillins) Anaphylaxis Strawberry Anaphylaxis Sulfur Anaphylaxis Adhesive Tape-Silicones Hives Depakote (Divalproex) Other (comments) Paralysis Thorazine (Chlorpromazine) Other (comments) Not sure Toradol (Ketorolac) Seizures Tramadol Seizures Recent Documentation Weight BMI Smoking Status 63.5 kg 29.26 kg/m2 Former Smoker Emergency Contacts Name Discharge Info Relation Home Work Mobile Raquel Ma DISCHARGE CAREGIVER [3] Daughter [21] [phone] Patient Belongings The following personal items are in your possession at time of discharge: 
     Visual Aid: None             Clothing: Roxana Guerrero Please provide this summary of care documentation to your next provider. Signatures-by signing, you are acknowledging that this After Visit Summary has been reviewed with you and you have received a copy. Patient Signature:  ____________________________________________________________ Date:  ____________________________________________________________  
  
Claudene Born Provider Signature:  ____________________________________________________________ Date:  ____________________________________________________________

## 2018-04-27 NOTE — H&P
History and Physical      NAME:  Steph Holland   :   1970   MRN:   [id number]     Date/Time:  2018     CHIEF COMPLAINT: SOB     HISTORY OF PRESENT ILLNESS:     Ms. Macario Paula is a 50 y.o.   female with a PMH of asthma, pneumonia, bipolar d/o, DVT and PE who presents with c/c of shortness of breathing. She has asthma since childhood. She uses inhalers at home. She was diagnosed with pneumonia 3 days ago and was started on zithromax. She has cough productive of greenish sputum. She denies fever or chills. No chest pain or palpitation. Here in ED she has CXR that showed minimal left lung base haziness, may represent atelectasis or developing infiltrate. CT of the chest also showed no PE but has evidence of moderate heterogeneous patchy infiltrates in left lower lobe of lung and in superior lingula of left lung. She was started on IV antibiotics with levaquin and albuterol. She continued to have SOB and admitted for further management. Past Medical History:   Diagnosis Date    Asthma     Bipolar 1 disorder ([address]     Hiatal hernia         Past Surgical History:   Procedure Laterality Date    HX  SECTION      HX HYSTERECTOMY         Social History   Substance Use Topics    Smoking status: Former Smoker    Smokeless tobacco: Former User    Alcohol use 0.6 oz/week     1 Shots of liquor per week        Family History   Problem Relation Age of Onset    Thyroid Disease Mother     Diabetes Mother     Heart Attack Mother         Allergies   Allergen Reactions    Pcn [Penicillins] Anaphylaxis    Strawberry Anaphylaxis    Sulfur Anaphylaxis    Adhesive Tape-Silicones Hives    Depakote [Divalproex] Other (comments)     Paralysis      Thorazine [Chlorpromazine] Other (comments)     Not sure      Toradol [Ketorolac] Seizures    Tramadol Seizures        Prior to Admission medications    Medication Sig Start Date End Date Taking? Authorizing Provider   azithromycin (ZITHROMAX Z-CALIN) 250 mg tablet Take two tablets on day one then one tablet daily for the next 4 days. 4/25/18 4/30/18  Jeanine Solano,    menthol-sorbitol (THROAT LOZENGES) lozenge Take 1 Lozenge by mouth as needed for Pain. 4/25/18   Jeanine Solano,    ondansetron (ZOFRAN ODT) 4 mg disintegrating tablet Take 1 Tab by mouth every eight (8) hours as needed for Nausea. 4/17/18   Emory Severino MD   Benzocaine-Menthol [facility] SORE THROAT) 6-10 mg lozg 1 Lozenge by Mucous Membrane route as needed. 4/17/18   Emory Severino MD   diphenhydrAMINE (BENADRYL) 25 mg capsule Take 25 mg by mouth every six (6) hours as needed. Historical Provider   diclofenac (VOLTAREN) 1 % gel Apply  to affected area four (4) times daily. Historical Provider   melatonin 10 mg tab Take  by mouth. Historical Provider   EPINEPHrine (EPIPEN) 0.3 mg/0.3 mL injection 0.3 mg by IntraMUSCular route once as needed. Historical Provider   LORazepam (ATIVAN) 1 mg tablet Take 1 Tab by mouth two (2) times a day. Max Daily Amount: 2 mg. 6/4/18   Roberto Holbrook MD   fluticasone-salmeterol (ADVAIR HFA) 719-33 mcg/actuation inhaler Take 2 Puffs by inhalation two (2) times a day. Opal Vasquez MD   albuterol (PROVENTIL HFA, VENTOLIN HFA, PROAIR HFA) 90 mcg/actuation inhaler Take 1 Puff by inhalation. Opal Vasquez MD   beclomethasone (QVAR) 80 mcg/actuation aero Take 1 Puff by inhalation two (2) times a day.     Opal Vasquez MD       REVIEW OF SYSTEMS:     CONSTITUTIONAL: No Fever, No chills, No weight loss, No Night sweats  HEENT:  No epistaxis, No diff in swallowing  CVS: No chest pain, No palpitations, No syncope, No peripheral edema, No PND, No orthopnea  RS: No hemoptysis, No pleuritic chest pain  GI: No abd pain, No vomitting, No diarrhea, No hematemesis, No rectal bleeding, No acid reflux or heartburn  NEURO: No focal weakness, No headaches, No seizures  PSYCH: No anxiety, No depression  MUSCULOSKLETAL: No joint pain or swelling  : No hematuria or dysuria  SKIN: No rash      Physical Exam:    VITALS:    Vital signs reviewed; most recent are:    Visit Vitals    /62    Pulse (!) 101    Temp 100.2 °F (37.9 °C)    Resp 21    Wt 63.5 kg (140 lb)    SpO2 95%    BMI 29.26 kg/m2     SpO2 Readings from Last 6 Encounters:   04/27/18 95%   04/25/18 99%   04/17/18 93%   04/17/18 95%   04/04/18 95%   03/29/18 98%    O2 Flow Rate (L/min): 4 l/min   No intake or output data in the 24 hours ending 04/27/18 1806       GENERAL: Not in acute distress  HEENT: pink conjunctiva, un icteric sclera,   NECK: No lymphadenopthy or thyroid swelling, JVD not seen  LYMPH: No supraclavicular or cervical or axillary nodes on both sides  CVS: S1S2, No murmurs, No gallop or rub  RS: bilateral wheezing  Abd: Soft, non tender, not distended, No guarding, No rigidity  NEURO:  No focal neurologic deficits   Extrm: no leg edema or swelling   Skin: No rash      Labs:  Recent Results (from the past 24 hour(s))   CBC WITH AUTOMATED DIFF    Collection Time: 04/27/18 12:34 PM   Result Value Ref Range    WBC 5.4 4.6 - 13.2 K/uL    RBC 4.82 4.20 - 5.30 M/uL    HGB 12.7 12.0 - 16.0 g/dL    HCT 39.2 35.0 - 45.0 %    MCV 81.3 74.0 - 97.0 FL    MCH 26.3 24.0 - 34.0 PG    MCHC 32.4 31.0 - 37.0 g/dL    RDW 15.3 (H) 11.6 - 14.5 %    PLATELET 659 637 - 339 K/uL    MPV 10.4 9.2 - 11.8 FL    NEUTROPHILS 62 40 - 73 %    LYMPHOCYTES 25 21 - 52 %    MONOCYTES 11 (H) 3 - 10 %    EOSINOPHILS 2 0 - 5 %    BASOPHILS 0 0 - 2 %    ABS. NEUTROPHILS 3.3 1.8 - 8.0 K/UL    ABS. LYMPHOCYTES 1.3 0.9 - 3.6 K/UL    ABS. MONOCYTES 0.6 0.05 - 1.2 K/UL    ABS. EOSINOPHILS 0.1 0.0 - 0.4 K/UL    ABS.  BASOPHILS 0.0 0.0 - 0.06 K/UL    DF AUTOMATED     METABOLIC PANEL, BASIC    Collection Time: 04/27/18 12:34 PM   Result Value Ref Range    Sodium 142 136 - 145 mmol/L    Potassium 3.4 (L) 3.5 - 5.5 mmol/L    Chloride 108 100 - 108 mmol/L    CO2 29 21 - 32 mmol/L    Anion gap 5 3.0 - 18 mmol/L    Glucose 92 74 - 99 mg/dL    BUN 6 (L) 7.0 - 18 MG/DL    Creatinine 0.73 0.6 - 1.3 MG/DL    BUN/Creatinine ratio 8 (L) 12 - 20      GFR est AA >60 >60 ml/min/1.73m2    GFR est non-AA >60 >60 ml/min/1.73m2    Calcium 8.3 (L) 8.5 - 10.1 MG/DL   D DIMER    Collection Time: 04/27/18 12:34 PM   Result Value Ref Range    D DIMER 0.67 (H) <0.46 ug/ml(FEU)   EKG, 12 LEAD, INITIAL    Collection Time: 04/27/18 12:58 PM   Result Value Ref Range    Ventricular Rate 103 BPM    Atrial Rate 103 BPM    P-R Interval 112 ms    QRS Duration 84 ms    Q-T Interval 326 ms    QTC Calculation (Bezet) 427 ms    Calculated P Axis 59 degrees    Calculated R Axis 21 degrees    Calculated T Axis 18 degrees    Diagnosis       Sinus tachycardia  Otherwise normal ECG  When compared with ECG of 25-APR-2018 09:38,  Nonspecific T wave abnormality no longer evident in Lateral leads  Confirmed by Murel Shoulder ([id number]) on 4/27/2018 4:18:55 PM           Active Problems:    Acute asthma exacerbation (4/27/2018)        Assessment:       1. Acute asthma exacerbation  2. LLL pneumonia   3. Bipolar d/o,   4. H/o DVT and PE    Plan:       · Admit to medical floor  · Continue IV antibiotics with levaquin  · Sputum and blood culture  · Continue bronchodilators, nebs  · IV steroids with solumedrol and advair  · Resume home meds  · Full code  · DVT prophylaxsis    Total time:  64 minutes             _______________________________________________________________________        Attending Physician:  Jonny López MD

## 2018-04-27 NOTE — ROUTINE PROCESS
Bedside shift change report given to Kellee SHARPE (oncoming nurse) by Radha Coto RN   (offgoing nurse). Report included the following information SBAR, Kardex, MAR and Recent Results.

## 2018-04-28 LAB
ANION GAP SERPL CALC-SCNC: 7 MMOL/L (ref 3–18)
BASOPHILS # BLD: 0 K/UL (ref 0–0.1)
BASOPHILS NFR BLD: 0 % (ref 0–2)
BUN SERPL-MCNC: 8 MG/DL (ref 7–18)
BUN/CREAT SERPL: 10 (ref 12–20)
CALCIUM SERPL-MCNC: 8.5 MG/DL (ref 8.5–10.1)
CHLORIDE SERPL-SCNC: 107 MMOL/L (ref 100–108)
CO2 SERPL-SCNC: 28 MMOL/L (ref 21–32)
CREAT SERPL-MCNC: 0.8 MG/DL (ref 0.6–1.3)
DIFFERENTIAL METHOD BLD: ABNORMAL
EOSINOPHIL # BLD: 0 K/UL (ref 0–0.4)
EOSINOPHIL NFR BLD: 0 % (ref 0–5)
ERYTHROCYTE [DISTWIDTH] IN BLOOD BY AUTOMATED COUNT: 15.1 % (ref 11.6–14.5)
GLUCOSE SERPL-MCNC: 142 MG/DL (ref 74–99)
HCT VFR BLD AUTO: 37.2 % (ref 35–45)
HGB BLD-MCNC: 11.8 G/DL (ref 12–16)
L PNEUMO AG UR QL IA: NEGATIVE
LYMPHOCYTES # BLD: 0.8 K/UL (ref 0.9–3.6)
LYMPHOCYTES NFR BLD: 20 % (ref 21–52)
MCH RBC QN AUTO: 26 PG (ref 24–34)
MCHC RBC AUTO-ENTMCNC: 31.7 G/DL (ref 31–37)
MCV RBC AUTO: 81.9 FL (ref 74–97)
MONOCYTES # BLD: 0.2 K/UL (ref 0.05–1.2)
MONOCYTES NFR BLD: 5 % (ref 3–10)
NEUTS SEG # BLD: 3 K/UL (ref 1.8–8)
NEUTS SEG NFR BLD: 75 % (ref 40–73)
PLATELET # BLD AUTO: 311 K/UL (ref 135–420)
PMV BLD AUTO: 10.2 FL (ref 9.2–11.8)
POTASSIUM SERPL-SCNC: 4.1 MMOL/L (ref 3.5–5.5)
RBC # BLD AUTO: 4.54 M/UL (ref 4.2–5.3)
SODIUM SERPL-SCNC: 142 MMOL/L (ref 136–145)
WBC # BLD AUTO: 4 K/UL (ref 4.6–13.2)

## 2018-04-28 PROCEDURE — 74011250637 HC RX REV CODE- 250/637: Performed by: NURSE PRACTITIONER

## 2018-04-28 PROCEDURE — 80048 BASIC METABOLIC PNL TOTAL CA: CPT | Performed by: INTERNAL MEDICINE

## 2018-04-28 PROCEDURE — 77010033678 HC OXYGEN DAILY

## 2018-04-28 PROCEDURE — 74011250636 HC RX REV CODE- 250/636: Performed by: NURSE PRACTITIONER

## 2018-04-28 PROCEDURE — 74011000250 HC RX REV CODE- 250: Performed by: NURSE PRACTITIONER

## 2018-04-28 PROCEDURE — 36415 COLL VENOUS BLD VENIPUNCTURE: CPT | Performed by: INTERNAL MEDICINE

## 2018-04-28 PROCEDURE — 74011250636 HC RX REV CODE- 250/636: Performed by: INTERNAL MEDICINE

## 2018-04-28 PROCEDURE — 85025 COMPLETE CBC W/AUTO DIFF WBC: CPT | Performed by: INTERNAL MEDICINE

## 2018-04-28 PROCEDURE — 94640 AIRWAY INHALATION TREATMENT: CPT

## 2018-04-28 PROCEDURE — 74011000250 HC RX REV CODE- 250: Performed by: INTERNAL MEDICINE

## 2018-04-28 PROCEDURE — 74011250637 HC RX REV CODE- 250/637: Performed by: INTERNAL MEDICINE

## 2018-04-28 PROCEDURE — 87450 LEGIONELLA PNEUMOPHILA AG, URINE: CPT | Performed by: NURSE PRACTITIONER

## 2018-04-28 PROCEDURE — 65270000029 HC RM PRIVATE

## 2018-04-28 RX ORDER — CODEINE PHOSPHATE AND GUAIFENESIN 10; 100 MG/5ML; MG/5ML
5 SOLUTION ORAL
Status: DISCONTINUED | OUTPATIENT
Start: 2018-04-28 | End: 2018-04-30 | Stop reason: HOSPADM

## 2018-04-28 RX ORDER — LORAZEPAM 0.5 MG/1
0.5 TABLET ORAL
Status: DISCONTINUED | OUTPATIENT
Start: 2018-04-28 | End: 2018-04-28

## 2018-04-28 RX ORDER — ALBUTEROL SULFATE 0.83 MG/ML
2.5 SOLUTION RESPIRATORY (INHALATION)
Status: DISCONTINUED | OUTPATIENT
Start: 2018-04-28 | End: 2018-04-30 | Stop reason: HOSPADM

## 2018-04-28 RX ORDER — LORAZEPAM 1 MG/1
1 TABLET ORAL
Status: DISCONTINUED | OUTPATIENT
Start: 2018-04-28 | End: 2018-04-30 | Stop reason: HOSPADM

## 2018-04-28 RX ADMIN — ALBUTEROL SULFATE 2.5 MG: 2.5 SOLUTION RESPIRATORY (INHALATION) at 21:24

## 2018-04-28 RX ADMIN — ALBUTEROL SULFATE 2.5 MG: 2.5 SOLUTION RESPIRATORY (INHALATION) at 06:38

## 2018-04-28 RX ADMIN — ENOXAPARIN SODIUM 40 MG: 40 INJECTION SUBCUTANEOUS at 18:41

## 2018-04-28 RX ADMIN — Medication 10 ML: at 13:58

## 2018-04-28 RX ADMIN — METHYLPREDNISOLONE SODIUM SUCCINATE 20 MG: 40 INJECTION, POWDER, FOR SOLUTION INTRAMUSCULAR; INTRAVENOUS at 21:30

## 2018-04-28 RX ADMIN — OXYCODONE HYDROCHLORIDE AND ACETAMINOPHEN 1 TABLET: 5; 325 TABLET ORAL at 04:15

## 2018-04-28 RX ADMIN — Medication 10 ML: at 21:31

## 2018-04-28 RX ADMIN — LORAZEPAM 1 MG: 1 TABLET ORAL at 19:32

## 2018-04-28 RX ADMIN — OXYCODONE HYDROCHLORIDE AND ACETAMINOPHEN 1 TABLET: 5; 325 TABLET ORAL at 13:58

## 2018-04-28 RX ADMIN — GUAIFENESIN AND CODEINE PHOSPHATE 5 ML: 100; 10 SOLUTION ORAL at 13:58

## 2018-04-28 RX ADMIN — METHYLPREDNISOLONE SODIUM SUCCINATE 20 MG: 40 INJECTION, POWDER, FOR SOLUTION INTRAMUSCULAR; INTRAVENOUS at 13:44

## 2018-04-28 RX ADMIN — ALBUTEROL SULFATE 2.5 MG: 2.5 SOLUTION RESPIRATORY (INHALATION) at 10:33

## 2018-04-28 RX ADMIN — Medication 10 ML: at 08:12

## 2018-04-28 RX ADMIN — METHYLPREDNISOLONE SODIUM SUCCINATE 40 MG: 40 INJECTION, POWDER, FOR SOLUTION INTRAMUSCULAR; INTRAVENOUS at 06:38

## 2018-04-28 RX ADMIN — FLUTICASONE FUROATE 1 PUFF: 100 POWDER RESPIRATORY (INHALATION) at 08:04

## 2018-04-28 RX ADMIN — LEVOFLOXACIN 750 MG: 5 INJECTION, SOLUTION INTRAVENOUS at 13:44

## 2018-04-28 RX ADMIN — ALBUTEROL SULFATE 2.5 MG: 2.5 SOLUTION RESPIRATORY (INHALATION) at 15:25

## 2018-04-28 NOTE — ROUTINE PROCESS
Bedside shift change report given to Chente Massey (oncoming nurse) by Brenda Storey RN   (offgoing nurse). Report included the following information SBAR, Kardex, MAR and Recent Results.

## 2018-04-28 NOTE — PROGRESS NOTES
[facility] Hospitalist Group  Progress Note    Patient: Ash Sheets Age: 50 y.o. : 1970 MR#: [id number] SSN: [id number]  Date: 2018     Subjective:     Shortness of breath improved marginally, reports continued productive cough but less productive. C/o generalized chest pain with cough, keeping her awake last night. Assessment/Plan:   1. Pneumonia, LLL; POA - cont levaquin, check legionella; start amy nebs. Taper steroids C/o chest pain with cough start robitussin AC (tolerating percocet without issue). Blood cx Ngtd  2. Asthma, mild intermittent-  Cont arnuity ellipta, amy nebs; taper steroids; reports oxygen use PRN at home  3. Bipolar disorder - states no current meds other than taking ativan twice a day ongoing. Will start twice daily as needed to avoid benzo withdrawal.  4.  H/o PE- patient states last clot 5 years ago, states taken off without issue  5. Intentional weight loss - reports intentional weight loss of 60 lbs over 2 year duration  6. H/o bladder cancer - per patient report, states follows with urology    Addendum - called by nursing then talked with patient, she seems quite dependent on ativan at this point. This medication was recently tapered by PCP as well - fine to resume at PTA dose.   Additional Notes:      Case discussed with:  [x]Patient  []Family  [x]Nursing  []Case Management  DVT Prophylaxis:  [x]Lovenox  []Hep SQ  []SCDs  []Coumadin   []On Heparin gtt    Objective:   VS:   Visit Vitals    /61 (BP 1 Location: Left arm, BP Patient Position: At rest)    Pulse (!) 103    Temp 98.5 °F (36.9 °C)    Resp 18    Wt 63.5 kg (140 lb)    SpO2 94%    BMI 29.26 kg/m2      Tmax/24hrs: Temp (24hrs), Av.6 °F (37 °C), Min:97.1 °F (36.2 °C), Max:100.2 °F (37.9 °C)    Intake/Output Summary (Last 24 hours) at 18 1129  Last data filed at 18 0914   Gross per 24 hour   Intake              440 ml   Output                2 ml Net              438 ml     General:  Alert, NAD  Cardiovascular:  RRR  Pulmonary:  + wheeze throughout; LLL crackles, trace to RLL  GI:  +BS in all four quadrants, soft, non-tender  Extremities:  No edema; 2+ dorsalis pedis pulses bilaterally  Neuro: oriented x 4     Family contact: Daughter Zelalem Fernandez [phone]    Labs:    Recent Results (from the past 24 hour(s))   CBC WITH AUTOMATED DIFF    Collection Time: 04/27/18 12:34 PM   Result Value Ref Range    WBC 5.4 4.6 - 13.2 K/uL    RBC 4.82 4.20 - 5.30 M/uL    HGB 12.7 12.0 - 16.0 g/dL    HCT 39.2 35.0 - 45.0 %    MCV 81.3 74.0 - 97.0 FL    MCH 26.3 24.0 - 34.0 PG    MCHC 32.4 31.0 - 37.0 g/dL    RDW 15.3 (H) 11.6 - 14.5 %    PLATELET 919 175 - 587 K/uL    MPV 10.4 9.2 - 11.8 FL    NEUTROPHILS 62 40 - 73 %    LYMPHOCYTES 25 21 - 52 %    MONOCYTES 11 (H) 3 - 10 %    EOSINOPHILS 2 0 - 5 %    BASOPHILS 0 0 - 2 %    ABS. NEUTROPHILS 3.3 1.8 - 8.0 K/UL    ABS. LYMPHOCYTES 1.3 0.9 - 3.6 K/UL    ABS. MONOCYTES 0.6 0.05 - 1.2 K/UL    ABS. EOSINOPHILS 0.1 0.0 - 0.4 K/UL    ABS.  BASOPHILS 0.0 0.0 - 0.06 K/UL    DF AUTOMATED     METABOLIC PANEL, BASIC    Collection Time: 04/27/18 12:34 PM   Result Value Ref Range    Sodium 142 136 - 145 mmol/L    Potassium 3.4 (L) 3.5 - 5.5 mmol/L    Chloride 108 100 - 108 mmol/L    CO2 29 21 - 32 mmol/L    Anion gap 5 3.0 - 18 mmol/L    Glucose 92 74 - 99 mg/dL    BUN 6 (L) 7.0 - 18 MG/DL    Creatinine 0.73 0.6 - 1.3 MG/DL    BUN/Creatinine ratio 8 (L) 12 - 20      GFR est AA >60 >60 ml/min/1.73m2    GFR est non-AA >60 >60 ml/min/1.73m2    Calcium 8.3 (L) 8.5 - 10.1 MG/DL   D DIMER    Collection Time: 04/27/18 12:34 PM   Result Value Ref Range    D DIMER 0.67 (H) <0.46 ug/ml(FEU)   EKG, 12 LEAD, INITIAL    Collection Time: 04/27/18 12:58 PM   Result Value Ref Range    Ventricular Rate 103 BPM    Atrial Rate 103 BPM    P-R Interval 112 ms    QRS Duration 84 ms    Q-T Interval 326 ms    QTC Calculation (Bezet) 427 ms    Calculated P Axis 59 degrees    Calculated R Axis 21 degrees    Calculated T Axis 18 degrees    Diagnosis       Sinus tachycardia  Otherwise normal ECG  When compared with ECG of 25-APR-2018 09:38,  Nonspecific T wave abnormality no longer evident in Lateral leads  Confirmed by Stephenie Reyes ([id number]) on 4/27/2018 4:18:55 PM     CULTURE, BLOOD    Collection Time: 04/27/18  2:55 PM   Result Value Ref Range    Special Requests: NO SPECIAL REQUESTS      Culture result: NO GROWTH AFTER 14 HOURS     CULTURE, BLOOD    Collection Time: 04/27/18  3:05 PM   Result Value Ref Range    Special Requests: NO SPECIAL REQUESTS      Culture result: NO GROWTH AFTER 14 HOURS     METABOLIC PANEL, BASIC    Collection Time: 04/28/18  1:27 AM   Result Value Ref Range    Sodium 142 136 - 145 mmol/L    Potassium 4.1 3.5 - 5.5 mmol/L    Chloride 107 100 - 108 mmol/L    CO2 28 21 - 32 mmol/L    Anion gap 7 3.0 - 18 mmol/L    Glucose 142 (H) 74 - 99 mg/dL    BUN 8 7.0 - 18 MG/DL    Creatinine 0.80 0.6 - 1.3 MG/DL    BUN/Creatinine ratio 10 (L) 12 - 20      GFR est AA >60 >60 ml/min/1.73m2    GFR est non-AA >60 >60 ml/min/1.73m2    Calcium 8.5 8.5 - 10.1 MG/DL   CBC WITH AUTOMATED DIFF    Collection Time: 04/28/18  1:27 AM   Result Value Ref Range    WBC 4.0 (L) 4.6 - 13.2 K/uL    RBC 4.54 4.20 - 5.30 M/uL    HGB 11.8 (L) 12.0 - 16.0 g/dL    HCT 37.2 35.0 - 45.0 %    MCV 81.9 74.0 - 97.0 FL    MCH 26.0 24.0 - 34.0 PG    MCHC 31.7 31.0 - 37.0 g/dL    RDW 15.1 (H) 11.6 - 14.5 %    PLATELET 845 173 - 705 K/uL    MPV 10.2 9.2 - 11.8 FL    NEUTROPHILS 75 (H) 40 - 73 %    LYMPHOCYTES 20 (L) 21 - 52 %    MONOCYTES 5 3 - 10 %    EOSINOPHILS 0 0 - 5 %    BASOPHILS 0 0 - 2 %    ABS. NEUTROPHILS 3.0 1.8 - 8.0 K/UL    ABS. LYMPHOCYTES 0.8 (L) 0.9 - 3.6 K/UL    ABS. MONOCYTES 0.2 0.05 - 1.2 K/UL    ABS. EOSINOPHILS 0.0 0.0 - 0.4 K/UL    ABS.  BASOPHILS 0.0 0.0 - 0.1 K/UL    DF AUTOMATED       Signed By: Ariel Arroyo NP     April 28, 2018

## 2018-04-28 NOTE — PROGRESS NOTES
Bedside shift change report given to DELL Hightower (oncoming nurse) by Mikael Bernheim (offgoing nurse). Report included the following information SBAR, Intake/Output, MAR and Recent Results. Patient resting in bed on telephone.

## 2018-04-28 NOTE — ROUTINE PROCESS
Bedside shift change report given to Joseph Corado (oncoming nurse) by Aakash Freedman (offgoing nurse). Report included the following information SBAR, ED Summary, Procedure Summary, Intake/Output, MAR and Recent Results. Patient resting in bed. Patient flagging as sepsis r/t previous mews scores in ED. MEWS scores addressed in ED prior to patient being admitted to unit, sepsis protocol followed, not further action required at this time. Will continue to monitor patient. Patient on 02, 2L NC. No SOB at this time. Patient pleasant, patient board filled out.

## 2018-04-29 LAB
ANION GAP SERPL CALC-SCNC: 6 MMOL/L (ref 3–18)
BASOPHILS # BLD: 0 K/UL (ref 0–0.06)
BASOPHILS NFR BLD: 0 % (ref 0–3)
BUN SERPL-MCNC: 15 MG/DL (ref 7–18)
BUN/CREAT SERPL: 18 (ref 12–20)
CALCIUM SERPL-MCNC: 9.6 MG/DL (ref 8.5–10.1)
CHLORIDE SERPL-SCNC: 108 MMOL/L (ref 100–108)
CO2 SERPL-SCNC: 29 MMOL/L (ref 21–32)
CREAT SERPL-MCNC: 0.82 MG/DL (ref 0.6–1.3)
DIFFERENTIAL METHOD BLD: ABNORMAL
EOSINOPHIL # BLD: 0 K/UL (ref 0–0.4)
EOSINOPHIL NFR BLD: 0 % (ref 0–5)
ERYTHROCYTE [DISTWIDTH] IN BLOOD BY AUTOMATED COUNT: 15.2 % (ref 11.6–14.5)
GLUCOSE SERPL-MCNC: 125 MG/DL (ref 74–99)
HCT VFR BLD AUTO: 37.8 % (ref 35–45)
HGB BLD-MCNC: 11.9 G/DL (ref 12–16)
LYMPHOCYTES # BLD: 1.1 K/UL (ref 0.8–3.5)
LYMPHOCYTES NFR BLD: 9 % (ref 20–51)
MCH RBC QN AUTO: 25.9 PG (ref 24–34)
MCHC RBC AUTO-ENTMCNC: 31.5 G/DL (ref 31–37)
MCV RBC AUTO: 82.2 FL (ref 74–97)
MONOCYTES # BLD: 0.6 K/UL (ref 0–1)
MONOCYTES NFR BLD: 5 % (ref 2–9)
NEUTS SEG # BLD: 9.4 K/UL (ref 1.8–8)
NEUTS SEG NFR BLD: 80 % (ref 42–75)
OTHER CELLS NFR BLD MANUAL: 6 %
PLATELET # BLD AUTO: 388 K/UL (ref 135–420)
PLATELET COMMENTS,PCOM: ABNORMAL
PMV BLD AUTO: 11.1 FL (ref 9.2–11.8)
POTASSIUM SERPL-SCNC: 4.1 MMOL/L (ref 3.5–5.5)
RBC # BLD AUTO: 4.6 M/UL (ref 4.2–5.3)
RBC MORPH BLD: ABNORMAL
SODIUM SERPL-SCNC: 143 MMOL/L (ref 136–145)
WBC # BLD AUTO: 11.8 K/UL (ref 4.6–13.2)

## 2018-04-29 PROCEDURE — 36415 COLL VENOUS BLD VENIPUNCTURE: CPT | Performed by: NURSE PRACTITIONER

## 2018-04-29 PROCEDURE — 74011250636 HC RX REV CODE- 250/636: Performed by: NURSE PRACTITIONER

## 2018-04-29 PROCEDURE — 80048 BASIC METABOLIC PNL TOTAL CA: CPT | Performed by: NURSE PRACTITIONER

## 2018-04-29 PROCEDURE — 74011000250 HC RX REV CODE- 250: Performed by: NURSE PRACTITIONER

## 2018-04-29 PROCEDURE — 77010033678 HC OXYGEN DAILY

## 2018-04-29 PROCEDURE — 74011250637 HC RX REV CODE- 250/637: Performed by: NURSE PRACTITIONER

## 2018-04-29 PROCEDURE — 85025 COMPLETE CBC W/AUTO DIFF WBC: CPT | Performed by: NURSE PRACTITIONER

## 2018-04-29 PROCEDURE — 87070 CULTURE OTHR SPECIMN AEROBIC: CPT | Performed by: INTERNAL MEDICINE

## 2018-04-29 PROCEDURE — 65270000029 HC RM PRIVATE

## 2018-04-29 PROCEDURE — 74011250636 HC RX REV CODE- 250/636: Performed by: INTERNAL MEDICINE

## 2018-04-29 PROCEDURE — 94640 AIRWAY INHALATION TREATMENT: CPT

## 2018-04-29 RX ORDER — PREDNISONE 20 MG/1
40 TABLET ORAL
Status: DISCONTINUED | OUTPATIENT
Start: 2018-04-30 | End: 2018-04-30 | Stop reason: HOSPADM

## 2018-04-29 RX ADMIN — ALBUTEROL SULFATE 2.5 MG: 2.5 SOLUTION RESPIRATORY (INHALATION) at 03:45

## 2018-04-29 RX ADMIN — ALBUTEROL SULFATE 2.5 MG: 2.5 SOLUTION RESPIRATORY (INHALATION) at 20:44

## 2018-04-29 RX ADMIN — ENOXAPARIN SODIUM 40 MG: 40 INJECTION SUBCUTANEOUS at 18:00

## 2018-04-29 RX ADMIN — Medication 10 ML: at 13:45

## 2018-04-29 RX ADMIN — Medication 10 ML: at 06:14

## 2018-04-29 RX ADMIN — METHYLPREDNISOLONE SODIUM SUCCINATE 20 MG: 40 INJECTION, POWDER, FOR SOLUTION INTRAMUSCULAR; INTRAVENOUS at 13:45

## 2018-04-29 RX ADMIN — LORAZEPAM 1 MG: 1 TABLET ORAL at 11:24

## 2018-04-29 RX ADMIN — GUAIFENESIN AND CODEINE PHOSPHATE 5 ML: 100; 10 SOLUTION ORAL at 20:44

## 2018-04-29 RX ADMIN — LORAZEPAM 1 MG: 1 TABLET ORAL at 23:13

## 2018-04-29 RX ADMIN — LEVOFLOXACIN 750 MG: 5 INJECTION, SOLUTION INTRAVENOUS at 13:45

## 2018-04-29 RX ADMIN — METHYLPREDNISOLONE SODIUM SUCCINATE 20 MG: 40 INJECTION, POWDER, FOR SOLUTION INTRAMUSCULAR; INTRAVENOUS at 06:13

## 2018-04-29 RX ADMIN — ALBUTEROL SULFATE 2.5 MG: 2.5 SOLUTION RESPIRATORY (INHALATION) at 09:16

## 2018-04-29 RX ADMIN — Medication 10 ML: at 23:13

## 2018-04-29 RX ADMIN — ALBUTEROL SULFATE 2.5 MG: 2.5 SOLUTION RESPIRATORY (INHALATION) at 14:59

## 2018-04-29 RX ADMIN — FLUTICASONE FUROATE 1 PUFF: 100 POWDER RESPIRATORY (INHALATION) at 09:16

## 2018-04-29 NOTE — ROUTINE PROCESS
Bedside and Verbal shift change report given to DELL Hightower (oncoming nurse) by Damon Nassar RN (offgoing nurse). Report included the following information SBAR, Kardex, MAR and Recent Results.     SITUATION:  Code Status: Full Code  Reason for Admission: Acute asthma exacerbation  Hospital day: 2  Problem List:       Hospital Problems  Date Reviewed: 4/17/2018          Codes Class Noted POA    Acute asthma exacerbation ICD-10-CM: J45.901  ICD-9-CM: 493.92  4/27/2018 Unknown              BACKGROUND:   Past Medical History:   Past Medical History:   Diagnosis Date    Asthma     Bipolar 1 disorder ([facility] [address]     Hiatal hernia       Patient taking anticoagulants no    Patient has a defibrillator: no    History of shots YES for example, flu, pneumonia, tetanus   Isolation History NO for example, MRSA, CDiff    ASSESSMENT:  Changes in Assessment Throughout Shift: None  Significant Changes in 24 hours (for example, RR/code, fall)  Patient has Central Line: no Reasons if yes: N/A  Patient has Gautam Cath: no Reasons if yes: N/A   Mobility Issues  PT  IV Patency  OR Checklist  Pending Tests    Last Vitals:  Vitals w/ MEWS Score (last day)     Date/Time MEWS Score Pulse Resp Temp BP Level of Consciousness SpO2    04/29/18 0429 1 88 18 96.7 °F (35.9 °C) 122/69 Alert 94 %    04/29/18 0345 -- -- -- -- -- -- 95 %    04/29/18 0000 1 83 18 97.9 °F (36.6 °C) 123/82 Alert 93 %    04/28/18 2124 -- -- -- -- -- -- 95 %    04/28/18 2000 1 97 18 98.5 °F (36.9 °C) 127/80 Alert 94 %    04/28/18 1533 1 100 18 99 °F (37.2 °C) 154/77 Alert 93 %    04/28/18 1117 3 (!)  103 18 98.5 °F (36.9 °C) 100/61 Alert 94 %    04/28/18 1034 -- -- -- -- -- -- 96 %    04/28/18 0743 1 90 20 97.1 °F (36.2 °C) 132/73 Alert 95 %    04/28/18 0000 2 98 22 98.6 °F (37 °C) 108/64 Alert 98 %            PAIN    Pain Assessment    Pain Intensity 1: 0 (04/29/18 7623)    Pain Location 1: Rib cage         Patient Stated Pain Goal: 0  Intervention effective: N/A  Time of last intervention: N/A Reassessment Completed: N/A   Other actions taken for pain: N/A    Last 3 Weights:  Last 3 Recorded Weights in this Encounter    04/27/18 1158   Weight: 63.5 kg (140 lb)   Weight change:     INTAKE/OUPUT    Current Shift:      Last three shifts: 04/27 0701 - 04/28 1900  In: 1780 [P.O.:1780]  Out: 500 [Urine:500]    RECOMMENDATIONS AND DISCHARGE PLANNING  Patient needs and requests: Ativan 1mg     Pending tests/procedures: None     Discharge plan for patient: Home    Discharge planning Needs or Barriers: None    Estimated Discharge Date: None Posted on Whiteboard in Patients Room: no       \"HEALS\" SAFETY CHECK  A safety check occurred in the patient's room between off going nurse and oncoming nurse listed above. The safety check included the below items:    H  High Alert Medications Verify all high alert medication drips (heparin, PCA, etc.)  E  Equipment Suction is set up for ALL patients (with jane)  Red plugs utilized for all equipment (IV pumps, etc.)  WOWs wiped down at end of shift. Room stocked with oxygen, suction, and other unit-specific supplies  A  Alarms Bed alarm is set for fall risk patients  Ensure chair alarm is in place and activated if patient is up in a chair  L  Lines Check IV for any infiltration  Gautam bag is empty if patient has a Gautam   Tubing and IV bags are labeled  S  Safety  Room is clean, patient is clean, and equipment is clean. Hallways are clear from equipment besides carts. Fall bracelet on for fall risk patients  Ensure room is clear and free of clutter  Suction is set up for ALL patients (with jane)  Hallways are clear from equipment besides carts.    Isolation precautions followed, supplies available outside room, sign posted    Kalina Dickinson RN

## 2018-04-29 NOTE — DISCHARGE SUMMARY
[facility] Hospitalist Group  Discharge Summary       Patient: Dwight Doyle Age: 50 y.o. : 1970 MR#: [id number] SSN: [id number]  PCP on record: Simone Farfan MD  Admit date: 2018  Discharge date: 2018    Disposition:    [x]Home   []Home with Home Health   []SNF/NH   []Rehab   []Home with family   []Alternate Facility:____________________    Discharge Diagnoses:                             1.  Left lower lobe pneumonia   2. Asthma, mild intermittent   3. Bipolar disorder   4. Remote h/o PE  5. Intentional weight loss   6. H/o bladder cancer     Discharge Medications:     Current Discharge Medication List      START taking these medications    Details   guaiFENesin-codeine (ROBITUSSIN AC) 100-10 mg/5 mL solution Take 5 mL by mouth every four (4) hours as needed for Cough. Max Daily Amount: 30 mL. Qty: 118 mL, Refills: 0    Associated Diagnoses: Pneumonia of left lower lobe due to infectious organism ([address]      acetaminophen (TYLENOL) 325 mg tablet Take 2 Tabs by mouth every four (4) hours as needed. Qty: 30 Tab, Refills: 0      levoFLOXacin (LEVAQUIN) 750 mg tablet Take 1 Tab by mouth daily for 4 days. Qty: 4 Tab, Refills: 0      predniSONE (DELTASONE) 10 mg tablet Prednisone 10mg tabs: p.o.  4 tabs daily for 2 days then drop to   3 tabs daily for 3 days then drop to   2 tabs daily for 3 days then drop to   1 tab daily for 3 days then stop. Dispense 29 tabs  Qty: 29 Tab, Refills: 0         CONTINUE these medications which have CHANGED    Details   Benzocaine-Menthol (CHLORASEPTIC SORE THROAT) 6-10 mg lozg 1 Lozenge by Mucous Membrane route as needed. Qty: 18 Lozenge, Refills: 0         CONTINUE these medications which have NOT CHANGED    Details   menthol-sorbitol (THROAT LOZENGES) lozenge Take 1 Lozenge by mouth as needed for Pain.   Qty: 10 Lozenge, Refills: 0      ondansetron (ZOFRAN ODT) 4 mg disintegrating tablet Take 1 Tab by mouth every eight (8) hours as needed for Nausea. Qty: 10 Tab, Refills: 0      diphenhydrAMINE (BENADRYL) 25 mg capsule Take 25 mg by mouth every six (6) hours as needed. diclofenac (VOLTAREN) 1 % gel Apply  to affected area four (4) times daily. melatonin 10 mg tab Take  by mouth. EPINEPHrine (EPIPEN) 0.3 mg/0.3 mL injection 0.3 mg by IntraMUSCular route once as needed. LORazepam (ATIVAN) 1 mg tablet Take 1 Tab by mouth two (2) times a day. Max Daily Amount: 2 mg. Qty: 60 Tab, Refills: 0    Associated Diagnoses: Fibromyalgia      fluticasone-salmeterol (ADVAIR HFA) 230-21 mcg/actuation inhaler Take 2 Puffs by inhalation two (2) times a day. albuterol (PROVENTIL HFA, VENTOLIN HFA, PROAIR HFA) 90 mcg/actuation inhaler Take 1 Puff by inhalation. beclomethasone (QVAR) 80 mcg/actuation aero Take 1 Puff by inhalation two (2) times a day. STOP taking these medications       azithromycin (ZITHROMAX Z-CALIN) 250 mg tablet Comments:   Reason for Stopping:             Consults:    - None    Procedures:  -   None    Significant Diagnostic Studies:   -  CTA CHEST W OR W WO CONT on 04/27/2018  IMPRESSIONS:     1. No evidence of pulmonary embolism. 2.  No evidence of thoracic aortic aneurysm or dissection. 3.  No evidence of pericardial effusion, pleural effusion or pneumothorax. 4.  Evidence of moderate heterogeneous patchy infiltrates in left lower lobe of lung  and in superior lingula of left lung. 5.  Evidence of fatty infiltration in liver. 6.  Evidence of mild subcarinal and bilateral hilar lymphadenopathies. 7.  Follow CT scan of chest with contrast in 6 months, recommended. XR CHEST PORT on 04/27/2018  IMPRESSION: Minimal left lung base haziness, may represent atelectasis or  developing infiltrate. Hospital Course by Problem   1.   Left lower lobe pneumonia - patient presented to hospital with complaint of shortness of breath and hoarseness of voice progressive over the past couple weeks prior to admission. She had presented to emergency department on both 04/17 and then 04/25 at the latter visit was given Zpak without improvement in symptoms. During admission patient was treated with levaquin and IV solumedrol with progression to steroid taper at time of discharge. Legionella was negative, blood cultures are negative x 3. At time of discharge patient is doing well with ambulation oxygen saturations between 92-96% on room air. 2.  Asthma, mild intermittent-  Patient was continued on qvar and advair along with PRN albuterol in addition to steroid taper during admission. Patient states she has oxygen at home available as needed from the past.  Discussed at this time she does not need oxygen. Patient very aware of her symptoms and if symptoms worsening in any way she is aware to seek medical care. 3.  Bipolar disorder - Patient relays long term history and states she has been managed with use of ativan only long term. Per outpatient records, patient was recently reduced on frequency of ativan. Continue same ativan dosage upon discharge. 4.  Remote h/o PE- No evidence of pulmonary emboli per CTA done this admission. Patient states previously with multiple pulmonary emboli but none for the last approximately five years. Patient states she was taken off blood thinners several years ago without issue. Follow as outpatient. 5.  Intentional weight loss - patient reports intentional weight loss of approximately 60 pounds over the last two years. She is hopeful to lose additional 30 pounds. Encourage continued safe weight loss through diet and exercise  Body mass index is 29.26 kg/(m^2). 6.  H/o bladder cancer - per patient report    Today's examination of the patient revealed:     Subjective:   Patient reports feeling well, does have some issues with coughing over night with pain as result.   Denies anxiety  Objective:   VS:   Visit Vitals    /70    Pulse 96    Temp 98.3 °F (36.8 °C)  Resp 18    Wt 63.5 kg (140 lb)    SpO2 94%    BMI 29.26 kg/m2      Tmax/24hrs: Temp (24hrs), Av.9 °F (36.6 °C), Min:96.7 °F (35.9 °C), Max:98.5 °F (36.9 °C)     Input/Output:   Intake/Output Summary (Last 24 hours) at 18 1735  Last data filed at 18 1345   Gross per 24 hour   Intake             1050 ml   Output              300 ml   Net              750 ml     General:  Alert, NAD  Cardiovascular:  RRR  Pulmonary:  Improved air movement; slight rhonchi to LLL; no shortness of breath with activity  GI:  +BS in all four quadrants, soft, non-tender  Extremities:  No edema; 2+ dorsalis pedis pulses bilaterally  Neuro: oriented x 4     Labs:    Recent Results (from the past 24 hour(s))   METABOLIC PANEL, BASIC    Collection Time: 18  2:40 AM   Result Value Ref Range    Sodium 143 136 - 145 mmol/L    Potassium 4.1 3.5 - 5.5 mmol/L    Chloride 108 100 - 108 mmol/L    CO2 29 21 - 32 mmol/L    Anion gap 6 3.0 - 18 mmol/L    Glucose 125 (H) 74 - 99 mg/dL    BUN 15 7.0 - 18 MG/DL    Creatinine 0.82 0.6 - 1.3 MG/DL    BUN/Creatinine ratio 18 12 - 20      GFR est AA >60 >60 ml/min/1.73m2    GFR est non-AA >60 >60 ml/min/1.73m2    Calcium 9.6 8.5 - 10.1 MG/DL   CBC WITH AUTOMATED DIFF    Collection Time: 18  2:40 AM   Result Value Ref Range    WBC 11.8 4.6 - 13.2 K/uL    RBC 4.60 4.20 - 5.30 M/uL    HGB 11.9 (L) 12.0 - 16.0 g/dL    HCT 37.8 35.0 - 45.0 %    MCV 82.2 74.0 - 97.0 FL    MCH 25.9 24.0 - 34.0 PG    MCHC 31.5 31.0 - 37.0 g/dL    RDW 15.2 (H) 11.6 - 14.5 %    PLATELET 036 023 - 800 K/uL    MPV 11.1 9.2 - 11.8 FL    NEUTROPHILS 80 (H) 42 - 75 %    LYMPHOCYTES 9 (L) 20 - 51 %    MONOCYTES 5 2 - 9 %    EOSINOPHILS 0 0 - 5 %    BASOPHILS 0 0 - 3 %    OTHER CELL 6 (H) 0      ABS. NEUTROPHILS 9.4 (H) 1.8 - 8.0 K/UL    ABS. LYMPHOCYTES 1.1 0.8 - 3.5 K/UL    ABS. MONOCYTES 0.6 0 - 1.0 K/UL    ABS. EOSINOPHILS 0.0 0.0 - 0.4 K/UL    ABS.  BASOPHILS 0.0 0.0 - 0.06 K/UL    DF MANUAL      PLATELET COMMENTS ADEQUATE PLATELETS      RBC COMMENTS NORMOCYTIC, NORMOCHROMIC     CULTURE, RESPIRATORY/SPUTUM/BRONCH W GRAM STAIN    Collection Time: 04/29/18 11:30 AM   Result Value Ref Range    Special Requests: NO SPECIAL REQUESTS      GRAM STAIN 10-25 WBC/lpf      GRAM STAIN 10-25 EPI/lpf      GRAM STAIN MUCUS PRESENT      GRAM STAIN FEW GRAM POSITIVE COCCI IN PAIRS      GRAM STAIN FEW GRAM NEGATIVE RODS      Culture result: PENDING      Additional Data Reviewed:     Condition:   Follow-up Appointments:   1.  Your PCP: David Spencer MD, within 5-7 days    >30 minutes spent coordinating this discharge (review instructions/follow-up, prescriptions, preparing report for sign off)    Signed:  Crystal Kauffman NP  4/29/2018  5:35 PM

## 2018-04-29 NOTE — ROUTINE PROCESS
Bedside and Verbal shift change report given to Nelia Mckeon (oncoming nurse) by Katrina Mishra RN (offgoing nurse). Report included the following information SBAR, Kardex, MAR and Recent Results.     SITUATION:  Code Status: Full Code  Reason for Admission: Acute asthma exacerbation  Hospital day: 2  Problem List:       Hospital Problems  Date Reviewed: 4/17/2018          Codes Class Noted POA    Acute asthma exacerbation ICD-10-CM: J45.901  ICD-9-CM: 493.92  4/27/2018 Unknown              BACKGROUND:   Past Medical History:   Past Medical History:   Diagnosis Date    Asthma     Bipolar 1 disorder ([facility] [address]     Hiatal hernia       Patient taking anticoagulants no    Patient has a defibrillator: no    History of shots YES for example, flu, pneumonia, tetanus   Isolation History NO for example, MRSA, CDiff    ASSESSMENT:  Changes in Assessment Throughout Shift: None  Significant Changes in 24 hours (for example, RR/code, fall)  Patient has Central Line: no Reasons if yes: N/A  Patient has Gautam Cath: no Reasons if yes: N/A   Mobility Issues  PT  IV Patency  OR Checklist  Pending Tests    Last Vitals:  Vitals w/ MEWS Score (last day)     Date/Time MEWS Score Pulse Resp Temp BP Level of Consciousness SpO2    04/29/18 1600 1 95 18 98.5 °F (36.9 °C) 110/70 Alert 95 %    04/29/18 1459 -- -- -- -- -- -- 94 %    04/29/18 1121 1 96 18 98.3 °F (36.8 °C) 105/70 Alert 95 %    04/29/18 0920 -- -- -- -- -- -- 94 %    04/29/18 0857 1 -- -- 98.2 °F (36.8 °C) 112/75 Alert --    04/29/18 0429 1 88 -- 96.7 °F (35.9 °C) 122/69 Alert 94 %    04/29/18 0345 -- -- -- -- -- -- 95 %    04/29/18 0000 1 83 18 97.9 °F (36.6 °C) 123/82 Alert 93 %    04/28/18 2124 -- -- -- -- -- -- 95 %    04/28/18 2000 1 97 18 98.5 °F (36.9 °C) 127/80 Alert 94 %    04/28/18 1533 1 100 18 99 °F (37.2 °C) 154/77 Alert 93 %    04/28/18 1117 3 (!)  103 18 98.5 °F (36.9 °C) 100/61 Alert 94 %    04/28/18 1034 -- -- -- -- -- -- 96 %    04/28/18 0743 1 90 20 97.1 °F (36.2 °C) 132/73 Alert 95 %    04/28/18 0000 2 98 22 98.6 °F (37 °C) 108/64 Alert 98 %            PAIN    Pain Assessment    Pain Intensity 1: 0 (04/29/18 1835)    Pain Location 1: Rib cage         Patient Stated Pain Goal: 0  Intervention effective: N/A  Time of last intervention: N/A Reassessment Completed: N/A   Other actions taken for pain: N/A    Last 3 Weights:  Last 3 Recorded Weights in this Encounter    04/27/18 1158   Weight: 63.5 kg (140 lb)   Weight change:     INTAKE/OUPUT    Current Shift:      Last three shifts: 04/28 0701 - 04/29 1900  In: 2410 [P.O.:2260; I.V.:150]  Out: 500 [Urine:500]    RECOMMENDATIONS AND DISCHARGE PLANNING  Patient needs and requests: Ativan 1mg     Pending tests/procedures: None     Discharge plan for patient: Home    Discharge planning Needs or Barriers: None    Estimated Discharge Date: None Posted on Whiteboard in Patients Room: no       \"HEALS\" SAFETY CHECK  A safety check occurred in the patient's room between off going nurse and oncoming nurse listed above. The safety check included the below items:    H  High Alert Medications Verify all high alert medication drips (heparin, PCA, etc.)  E  Equipment Suction is set up for ALL patients (with jane)  Red plugs utilized for all equipment (IV pumps, etc.)  WOWs wiped down at end of shift. Room stocked with oxygen, suction, and other unit-specific supplies  A  Alarms Bed alarm is set for fall risk patients  Ensure chair alarm is in place and activated if patient is up in a chair  L  Lines Check IV for any infiltration  Gautam bag is empty if patient has a Gautam   Tubing and IV bags are labeled  S  Safety  Room is clean, patient is clean, and equipment is clean. Hallways are clear from equipment besides carts. Fall bracelet on for fall risk patients  Ensure room is clear and free of clutter  Suction is set up for ALL patients (with jane)  Hallways are clear from equipment besides carts.    Isolation precautions followed, supplies available outside room, sign posted    John Mario RN

## 2018-04-29 NOTE — PROGRESS NOTES
[facility] Hospitalist Group  Progress Note    Patient: Gama Reyes Age: 50 y.o. : 1970 MR#: [id number] SSN: [id number]  Date: 2018     Subjective:     Shortness of breath continues to improve. Reports bipolar / Anxiety controlled with ativan    Assessment/Plan:   1. Pneumonia, LLL; POA - cont levaquin, legionella neg; cont amy nebs. Taper steroids with plan for transition to pred in AM.   Robitussin AC PRN. Blood cx Ngtd  2. Asthma, mild intermittent-  Cont arnuity ellipta, amy nebs; taper steroids; reports oxygen use PRN at home  3. Bipolar disorder - states no current meds other than taking ativan twice a day ongoing. Cont ativan BID PRN  4. H/o PE- patient states last clot 5 years ago, states taken off without issue  5. Intentional weight loss - reports intentional weight loss of 60 lbs over 2 year duration  6.   H/o bladder cancer - per patient report, states follows with urology    Anticipate d/c home in AM if continued progress / stability    Additional Notes:      Case discussed with:  [x]Patient  []Family  [x]Nursing  []Case Management  DVT Prophylaxis:  [x]Lovenox  []Hep SQ  []SCDs  []Coumadin   []On Heparin gtt    Objective:   VS:   Visit Vitals    /70    Pulse 96    Temp 98.3 °F (36.8 °C)    Resp 18    Wt 63.5 kg (140 lb)    SpO2 94%    BMI 29.26 kg/m2      Tmax/24hrs: Temp (24hrs), Av.9 °F (36.6 °C), Min:96.7 °F (35.9 °C), Max:98.5 °F (36.9 °C)      Intake/Output Summary (Last 24 hours) at 18 1604  Last data filed at 18 1345   Gross per 24 hour   Intake             1290 ml   Output              500 ml   Net              790 ml     General:  Alert, NAD  Cardiovascular:  RRR  Pulmonary:  Minimal rhonchi at LLL; minimal wheeze  GI:  +BS in all four quadrants, soft, non-tender  Extremities:  No edema; 2+ dorsalis pedis pulses bilaterally  Neuro: oriented x 4     Family contact: Daughter Alysia Quiet [phone]    Labs: Recent Results (from the past 24 hour(s))   METABOLIC PANEL, BASIC    Collection Time: 04/29/18  2:40 AM   Result Value Ref Range    Sodium 143 136 - 145 mmol/L    Potassium 4.1 3.5 - 5.5 mmol/L    Chloride 108 100 - 108 mmol/L    CO2 29 21 - 32 mmol/L    Anion gap 6 3.0 - 18 mmol/L    Glucose 125 (H) 74 - 99 mg/dL    BUN 15 7.0 - 18 MG/DL    Creatinine 0.82 0.6 - 1.3 MG/DL    BUN/Creatinine ratio 18 12 - 20      GFR est AA >60 >60 ml/min/1.73m2    GFR est non-AA >60 >60 ml/min/1.73m2    Calcium 9.6 8.5 - 10.1 MG/DL   CBC WITH AUTOMATED DIFF    Collection Time: 04/29/18  2:40 AM   Result Value Ref Range    WBC 11.8 4.6 - 13.2 K/uL    RBC 4.60 4.20 - 5.30 M/uL    HGB 11.9 (L) 12.0 - 16.0 g/dL    HCT 37.8 35.0 - 45.0 %    MCV 82.2 74.0 - 97.0 FL    MCH 25.9 24.0 - 34.0 PG    MCHC 31.5 31.0 - 37.0 g/dL    RDW 15.2 (H) 11.6 - 14.5 %    PLATELET 028 316 - 978 K/uL    MPV 11.1 9.2 - 11.8 FL    NEUTROPHILS 80 (H) 42 - 75 %    LYMPHOCYTES 9 (L) 20 - 51 %    MONOCYTES 5 2 - 9 %    EOSINOPHILS 0 0 - 5 %    BASOPHILS 0 0 - 3 %    OTHER CELL 6 (H) 0      ABS. NEUTROPHILS 9.4 (H) 1.8 - 8.0 K/UL    ABS. LYMPHOCYTES 1.1 0.8 - 3.5 K/UL    ABS. MONOCYTES 0.6 0 - 1.0 K/UL    ABS. EOSINOPHILS 0.0 0.0 - 0.4 K/UL    ABS.  BASOPHILS 0.0 0.0 - 0.06 K/UL    DF MANUAL      PLATELET COMMENTS ADEQUATE PLATELETS      RBC COMMENTS NORMOCYTIC, NORMOCHROMIC     CULTURE, RESPIRATORY/SPUTUM/BRONCH W GRAM STAIN    Collection Time: 04/29/18 11:30 AM   Result Value Ref Range    Special Requests: NO SPECIAL REQUESTS      GRAM STAIN 10-25 WBC/lpf      GRAM STAIN 10-25 EPI/lpf      GRAM STAIN MUCUS PRESENT      GRAM STAIN FEW GRAM POSITIVE COCCI IN PAIRS      GRAM STAIN FEW [address] NEGATIVE RODS      Culture result: PENDING      Signed By: Keron De Anda NP     April 29, 2018

## 2018-04-30 ENCOUNTER — HOME HEALTH ADMISSION (OUTPATIENT)
Dept: HOME HEALTH SERVICES | Facility: HOME HEALTH | Age: 48
End: 2018-04-30

## 2018-04-30 VITALS
OXYGEN SATURATION: 92 % | DIASTOLIC BLOOD PRESSURE: 82 MMHG | BODY MASS INDEX: 29.26 KG/M2 | TEMPERATURE: 98.2 F | WEIGHT: 140 LBS | HEART RATE: 92 BPM | RESPIRATION RATE: 18 BRPM | SYSTOLIC BLOOD PRESSURE: 134 MMHG

## 2018-04-30 LAB
ANION GAP SERPL CALC-SCNC: 5 MMOL/L (ref 3–18)
BASOPHILS # BLD: 0.1 K/UL (ref 0–0.06)
BASOPHILS NFR BLD: 0 % (ref 0–2)
BUN SERPL-MCNC: 13 MG/DL (ref 7–18)
BUN/CREAT SERPL: 17 (ref 12–20)
CALCIUM SERPL-MCNC: 8.4 MG/DL (ref 8.5–10.1)
CHLORIDE SERPL-SCNC: 106 MMOL/L (ref 100–108)
CO2 SERPL-SCNC: 31 MMOL/L (ref 21–32)
CREAT SERPL-MCNC: 0.75 MG/DL (ref 0.6–1.3)
DIFFERENTIAL METHOD BLD: ABNORMAL
EOSINOPHIL # BLD: 0 K/UL (ref 0–0.4)
EOSINOPHIL NFR BLD: 0 % (ref 0–5)
ERYTHROCYTE [DISTWIDTH] IN BLOOD BY AUTOMATED COUNT: 14.8 % (ref 11.6–14.5)
GLUCOSE SERPL-MCNC: 123 MG/DL (ref 74–99)
HCT VFR BLD AUTO: 37.1 % (ref 35–45)
HGB BLD-MCNC: 12.1 G/DL (ref 12–16)
LYMPHOCYTES # BLD: 2.9 K/UL (ref 0.9–3.6)
LYMPHOCYTES NFR BLD: 22 % (ref 21–52)
MCH RBC QN AUTO: 26.5 PG (ref 24–34)
MCHC RBC AUTO-ENTMCNC: 32.6 G/DL (ref 31–37)
MCV RBC AUTO: 81.4 FL (ref 74–97)
MONOCYTES # BLD: 1.2 K/UL (ref 0.05–1.2)
MONOCYTES NFR BLD: 9 % (ref 3–10)
NEUTS SEG # BLD: 9.2 K/UL (ref 1.8–8)
NEUTS SEG NFR BLD: 69 % (ref 40–73)
PLATELET # BLD AUTO: 376 K/UL (ref 135–420)
PMV BLD AUTO: 10.4 FL (ref 9.2–11.8)
POTASSIUM SERPL-SCNC: 3.3 MMOL/L (ref 3.5–5.5)
RBC # BLD AUTO: 4.56 M/UL (ref 4.2–5.3)
SODIUM SERPL-SCNC: 142 MMOL/L (ref 136–145)
WBC # BLD AUTO: 13.4 K/UL (ref 4.6–13.2)

## 2018-04-30 PROCEDURE — 85025 COMPLETE CBC W/AUTO DIFF WBC: CPT | Performed by: NURSE PRACTITIONER

## 2018-04-30 PROCEDURE — 94640 AIRWAY INHALATION TREATMENT: CPT

## 2018-04-30 PROCEDURE — 74011000250 HC RX REV CODE- 250: Performed by: NURSE PRACTITIONER

## 2018-04-30 PROCEDURE — 77010033678 HC OXYGEN DAILY

## 2018-04-30 PROCEDURE — 36415 COLL VENOUS BLD VENIPUNCTURE: CPT | Performed by: NURSE PRACTITIONER

## 2018-04-30 PROCEDURE — 74011636637 HC RX REV CODE- 636/637: Performed by: NURSE PRACTITIONER

## 2018-04-30 PROCEDURE — 80048 BASIC METABOLIC PNL TOTAL CA: CPT | Performed by: NURSE PRACTITIONER

## 2018-04-30 RX ORDER — LEVOFLOXACIN 750 MG/1
750 TABLET ORAL DAILY
Qty: 4 TAB | Refills: 0 | Status: SHIPPED | OUTPATIENT
Start: 2018-05-01 | End: 2018-05-05

## 2018-04-30 RX ORDER — ACETAMINOPHEN 325 MG/1
650 TABLET ORAL
Qty: 30 TAB | Refills: 0 | Status: SHIPPED
Start: 2018-04-30 | End: 2018-09-23

## 2018-04-30 RX ORDER — CODEINE PHOSPHATE AND GUAIFENESIN 10; 100 MG/5ML; MG/5ML
5 SOLUTION ORAL
Qty: 118 ML | Refills: 0 | Status: SHIPPED | OUTPATIENT
Start: 2018-04-30 | End: 2018-09-23

## 2018-04-30 RX ORDER — PREDNISONE 10 MG/1
TABLET ORAL
Qty: 29 TAB | Refills: 0 | Status: SHIPPED | OUTPATIENT
Start: 2018-04-30 | End: 2018-09-23

## 2018-04-30 RX ADMIN — Medication 10 ML: at 06:27

## 2018-04-30 RX ADMIN — ALBUTEROL SULFATE 2.5 MG: 2.5 SOLUTION RESPIRATORY (INHALATION) at 01:29

## 2018-04-30 RX ADMIN — PREDNISONE 40 MG: 20 TABLET ORAL at 08:25

## 2018-04-30 RX ADMIN — ALBUTEROL SULFATE 2.5 MG: 2.5 SOLUTION RESPIRATORY (INHALATION) at 08:25

## 2018-04-30 RX ADMIN — ALBUTEROL SULFATE 2.5 MG: 2.5 SOLUTION RESPIRATORY (INHALATION) at 14:08

## 2018-04-30 RX ADMIN — FLUTICASONE FUROATE 1 PUFF: 100 POWDER RESPIRATORY (INHALATION) at 08:25

## 2018-04-30 NOTE — PROGRESS NOTES
Pt with elevated MEWS score due to pain from coughing. Robitussin with codeine given for pain with coughing. Pt also receiving nebs every 4 hours.

## 2018-04-30 NOTE — PROGRESS NOTES
Reason for Admission:   Acute asthma exacerbation                   RRAT Score:                   12  Plan for utilizing home health:     Pt has no history of home health                     Likelihood of Readmission:                         Low/green  Transition of Care Plan:                    Pt will be discharged home to family    Pt has been discharged and states she has transportation home. H2H order sent to [facility] and Junior Jewell of [facility] aware.

## 2018-04-30 NOTE — ROUTINE PROCESS
Bedside and Verbal shift change report given to Rose Thurman (oncoming nurse) by Arlyn Patel RN (offgoing nurse). Report included the following information SBAR, Kardex, MAR and Recent Results.     SITUATION:  Code Status: Full Code  Reason for Admission: Acute asthma exacerbation  Hospital day: 3  Problem List:       Hospital Problems  Date Reviewed: 4/17/2018          Codes Class Noted POA    Acute asthma exacerbation ICD-10-CM: J45.901  ICD-9-CM: 493.92  4/27/2018 Unknown              BACKGROUND:   Past Medical History:   Past Medical History:   Diagnosis Date    Asthma     Bipolar 1 disorder ([facility] [address]     Hiatal hernia       Patient taking anticoagulants no    Patient has a defibrillator: no    History of shots YES for example, flu, pneumonia, tetanus   Isolation History NO for example, MRSA, CDiff    ASSESSMENT:  Changes in Assessment Throughout Shift: None  Significant Changes in 24 hours (for example, RR/code, fall)  Patient has Central Line: no Reasons if yes: N/A  Patient has Gautam Cath: no Reasons if yes: N/A   Mobility Issues  PT  IV Patency  OR Checklist  Pending Tests    Last Vitals:  Vitals w/ MEWS Score (last day)     Date/Time MEWS Score Pulse Resp Temp BP Level of Consciousness SpO2    04/30/18 0327 1 91 18 98.1 °F (36.7 °C) 135/86 Alert 93 %    04/30/18 0129 -- -- -- -- -- -- 97 %    04/30/18 0041 1 82 18 98.1 °F (36.7 °C) 138/81 Alert 97 %    04/29/18 2045 -- -- -- -- -- -- 96 %    04/29/18 2022 3 (!)  112 20 97.4 °F (36.3 °C) (!)  168/101 Alert 96 %    04/29/18 1600 1 95 18 98.5 °F (36.9 °C) 110/70 Alert 95 %    04/29/18 1459 -- -- -- -- -- -- 94 %    04/29/18 1121 1 96 18 98.3 °F (36.8 °C) 105/70 Alert 95 %    04/29/18 0920 -- -- -- -- -- -- 94 %    04/29/18 0857 1 -- -- 98.2 °F (36.8 °C) 112/75 Alert --    04/29/18 0429 1 88 -- 96.7 °F (35.9 °C) 122/69 Alert 94 %    04/29/18 0345 -- -- -- -- -- -- 95 %    04/29/18 0000 1 83 18 97.9 °F (36.6 °C) 123/82 Alert 93 %            PAIN    Pain Assessment    Pain Intensity 1: 0 (04/30/18 0327)    Pain Location 1: Rib cage    Pain Intervention(s) 1: Medication (see MAR)    Patient Stated Pain Goal: 0  Intervention effective: yes  Time of last intervention: 2244 Reassessment Completed: yes   Other actions taken for pain: None    Last 3 Weights:  Last 3 Recorded Weights in this Encounter    04/27/18 1158   Weight: 63.5 kg (140 lb)   Weight change:     INTAKE/OUPUT    Current Shift:      Last three shifts: 04/28 0701 - 04/29 1900  In: 2410 [P.O.:2260; I.V.:150]  Out: 500 [Urine:500]    RECOMMENDATIONS AND DISCHARGE PLANNING  Patient needs and requests: None    Pending tests/procedures: None     Discharge plan for patient: Home    Discharge planning Needs or Barriers: None    Estimated Discharge Date: 4/30/18 Posted on Whiteboard in Patients Room: no       \"HEALS\" SAFETY CHECK  A safety check occurred in the patient's room between off going nurse and oncoming nurse listed above. The safety check included the below items:    H  High Alert Medications Verify all high alert medication drips (heparin, PCA, etc.)  E  Equipment Suction is set up for ALL patients (with jane)  Red plugs utilized for all equipment (IV pumps, etc.)  WOWs wiped down at end of shift. Room stocked with oxygen, suction, and other unit-specific supplies  A  Alarms Bed alarm is set for fall risk patients  Ensure chair alarm is in place and activated if patient is up in a chair  L  Lines Check IV for any infiltration  Gautam bag is empty if patient has a Gautam   Tubing and IV bags are labeled  S  Safety  Room is clean, patient is clean, and equipment is clean. Hallways are clear from equipment besides carts. Fall bracelet on for fall risk patients  Ensure room is clear and free of clutter  Suction is set up for ALL patients (with jane)  Hallways are clear from equipment besides carts.    Isolation precautions followed, supplies available outside room, sign posted    [facility] Graciela Oh

## 2018-04-30 NOTE — DISCHARGE INSTRUCTIONS
Discharge Instructions    Patient: Gama Reyes MRN: [id number]  CSN: [id number]    YOB: 1970  Age: 50 y.o. Sex: female    DOA: 4/27/2018 LOS:  LOS: 3 days   Discharge Date:      DIET:  Regular Diet    ACTIVITY: Activity as tolerated, no restrictions    ADDITIONAL INFORMATION: If you experience any of the following symptoms but not limited to Fever, chills, nausea, vomiting, diarrhea, change in mentation, falling, bleeding, shortness of breath, chest pain, please call your primary care physician or return to the emergency room if you cannot get hold of your doctor:     FOLLOW UP CARE:  Dr. Raquel Moses MD in 5-7 days. Please call and set up an appointment.     Kelly Nova NP  4/30/2018 12:30 PM

## 2018-04-30 NOTE — PROGRESS NOTES
Transition of Care (GALEN) Plan:      GALEN Transportation:       How is patient being transported at discharge? Family    When?  4/30/18 at 1430   Is transport scheduled?no  Follow-up appointment and transportation:     PCP? Dr. Dontae Ramey 5//3   Specialist?     Time/Date? Who is transporting to the follow-up appointment? Is transport for follow up appointment scheduled? self  Communication plan (with patient/family): Who is being called? Patient or Next of Kin? Responsible party? patient    What number(s) is to be used? [phone]    What service provider is calling for [facility] services? When are they calling? TBD  Readmission Risk?   (Green/Low; Yellow/Moderate; Red/High):     Green/low

## 2018-04-30 NOTE — PROGRESS NOTES
Problem: Falls - Risk of  Goal: *Absence of Falls  Document Nicole Fall Risk and appropriate interventions in the flowsheet. Outcome: Progressing Towards Goal  Fall Risk Interventions: bed wheels locked, gripper socks on, call bell in reach.              Medication Interventions: Bed/chair exit alarm         History of Falls Interventions: Evaluate medications/consider consulting pharmacy, Door open when patient unattended

## 2018-04-30 NOTE — HOME CARE
4/30/18 - This nurse spoke with patient via phone since patient was discharged prior to receiving [facility] referral. Per the patient, she is currently at the pharmacy and \"has a lot going on right now. I'm in the midst of moving and figuring out what's going on. Please call me next week. \" This nurse tried to explain the why the [facility] is ordered but the patient does not seem interested and is preoccupied with many things going on at this time - TANO Jacques LPN    [address] Ame[address] Diego NP paged to inform of referral status - waiting for return call - TANO Jacques LPN    6008 - Received return call from Marylee Roe, NP - notified of patient's response to [facility] order. Per Dayton Smith, go ahead and cancel the order - the patient will be alright - TANO Fowler LPN

## 2018-05-01 ENCOUNTER — PATIENT OUTREACH (OUTPATIENT)
Dept: FAMILY MEDICINE CLINIC | Age: 48
End: 2018-05-01

## 2018-05-01 LAB
BACTERIA SPEC CULT: NORMAL
GRAM STN SPEC: NORMAL
SERVICE CMNT-IMP: NORMAL

## 2018-05-01 NOTE — PROGRESS NOTES
Nurse Navigator (NN) attempted to contact patient via telephone call. There was no response. A voicemail message was left requesting a non-emergency return telephone call. Nurse Navigator contact information provided. Nurse Navigator spoke with patient briefly via telephone call. Patient states that she is driving and needs to hang up. Nurse Navigator will continue with outreach efforts. Hospital Discharge Follow-Up  Per EMR Review:       Date/Time:  5/1/2018 9:26 AM    Patient was admitted to [facility] on 4/[facility]/18 and discharged on 4/30/18 for left lower lobe pneumonia. The physician discharge summary was available at the time of outreach. Inpatient RRAT score: 12  Was this a readmission? no   Patient stated reason for the readmission: [address] orders at discharge: [address] [place] [address] 114: [facility]  Date of initial visit: Earle refused home visit. Durable Medical Equipment ordered/company: n/a  Durable Medical Equipment received: n/a      Advance Care Planning:   Does patient have an Advance Directive:  not on file       Medication:     New Medications at Discharge:     START taking these medications     Details   guaiFENesin-codeine (ROBITUSSIN AC) 100-10 mg/5 mL solution Take 5 mL by mouth every four (4) hours as needed for Cough. Max Daily Amount: 30 mL. Qty: 118 mL, Refills: 0     Associated Diagnoses: Pneumonia of left lower lobe due to infectious organism (HCC)       acetaminophen (TYLENOL) 325 mg tablet Take 2 Tabs by mouth every four (4) hours as needed. Qty: 30 Tab, Refills: 0       levoFLOXacin (LEVAQUIN) 750 mg tablet Take 1 Tab by mouth daily for 4 days.   Qty: 4 Tab, Refills: 0       predniSONE (DELTASONE) 10 mg tablet Prednisone 10mg tabs: p.o.  4 tabs daily for 2 days then drop to   3 tabs daily for 3 days then drop to   2 tabs daily for 3 days then drop to   1 tab daily for 3 days then stop.     Dispense 29 tabs  Qty: 29 Tab, Refills: 0                      Discontinued Medications at Discharge:     STOP taking these medications         azithromycin (ZITHROMAX Z-CALIN) 250 mg tablet Comments:   Reason for Stopping:                    Current Outpatient Prescriptions   Medication Sig    guaiFENesin-codeine (ROBITUSSIN AC) 100-10 mg/5 mL solution Take 5 mL by mouth every four (4) hours as needed for Cough. Max Daily Amount: 30 mL.  acetaminophen (TYLENOL) 325 mg tablet Take 2 Tabs by mouth every four (4) hours as needed.  levoFLOXacin (LEVAQUIN) 750 mg tablet Take 1 Tab by mouth daily for 4 days.  Benzocaine-Menthol (CHLORASEPTIC SORE THROAT) 6-10 mg lozg 1 Lozenge by Mucous Membrane route as needed.  predniSONE (DELTASONE) 10 mg tablet Prednisone 10mg tabs: p.o.  4 tabs daily for 2 days then drop to   3 tabs daily for 3 days then drop to   2 tabs daily for 3 days then drop to   1 tab daily for 3 days then stop. Dispense 29 tabs    menthol-sorbitol (THROAT LOZENGES) lozenge Take 1 Lozenge by mouth as needed for Pain.  ondansetron (ZOFRAN ODT) 4 mg disintegrating tablet Take 1 Tab by mouth every eight (8) hours as needed for Nausea.  diphenhydrAMINE (BENADRYL) 25 mg capsule Take 25 mg by mouth every six (6) hours as needed.  diclofenac (VOLTAREN) 1 % gel Apply  to affected area four (4) times daily.  melatonin 10 mg tab Take  by mouth.  EPINEPHrine (EPIPEN) 0.3 mg/0.3 mL injection 0.3 mg by IntraMUSCular route once as needed.  [START ON 6/4/2018] LORazepam (ATIVAN) 1 mg tablet Take 1 Tab by mouth two (2) times a day. Max Daily Amount: 2 mg.  fluticasone-salmeterol (ADVAIR HFA) 230-21 mcg/actuation inhaler Take 2 Puffs by inhalation two (2) times a day.  albuterol (PROVENTIL HFA, VENTOLIN HFA, PROAIR HFA) 90 mcg/actuation inhaler Take 1 Puff by inhalation.  beclomethasone (QVAR) 80 mcg/actuation aero Take 1 Puff by inhalation two (2) times a day. No current facility-administered medications for this visit. There are no discontinued medications.     PCP/Specialist follow up: Future Appointments  Date Time Provider Frankie Sosa   5/2/2018 4:00 PM MD DAISY Graves   5/3/2018 2:00 PM Roberto Khan MD [facility] RANI HOLGUIN          Goals     None

## 2018-05-02 ENCOUNTER — PATIENT OUTREACH (OUTPATIENT)
Dept: FAMILY MEDICINE CLINIC | Age: 48
End: 2018-05-02

## 2018-05-02 ENCOUNTER — OFFICE VISIT (OUTPATIENT)
Dept: PAIN MANAGEMENT | Age: 48
End: 2018-05-02

## 2018-05-02 VITALS
HEIGHT: 58 IN | HEART RATE: 97 BPM | TEMPERATURE: 97.6 F | BODY MASS INDEX: 28.55 KG/M2 | SYSTOLIC BLOOD PRESSURE: 111 MMHG | WEIGHT: 136 LBS | RESPIRATION RATE: 16 BRPM | DIASTOLIC BLOOD PRESSURE: 68 MMHG

## 2018-05-02 DIAGNOSIS — M54.2 CERVICALGIA: ICD-10-CM

## 2018-05-02 DIAGNOSIS — M54.50 CHRONIC BILATERAL LOW BACK PAIN WITHOUT SCIATICA: ICD-10-CM

## 2018-05-02 DIAGNOSIS — M79.18 MYOFASCIAL PAIN SYNDROME: Primary | ICD-10-CM

## 2018-05-02 DIAGNOSIS — M79.7 FIBROMYALGIA: ICD-10-CM

## 2018-05-02 DIAGNOSIS — G89.29 CHRONIC BILATERAL LOW BACK PAIN WITHOUT SCIATICA: ICD-10-CM

## 2018-05-02 NOTE — PROGRESS NOTES
Hospital Follow Up     Nurse Navigator spoke with patient briefly via telephone call. Patient stated someone has called her every day since discharge. Patient states she has back to back doctor's appointments. Patient related that things are in utter chaos and that she will have to be called next week. Nurse navigator offered support and related that patient can call nurse navigator if she should need any assistance. GALEN follow up unable to be completed with patient at this time.

## 2018-05-02 NOTE — PROGRESS NOTES
[address] [facility]  Interventional Pain Management Consultation History & Physical    PATIENT NAME:  César Lorenz     YOB: 1970    DATE OF SERVICE:   5/2/2018      CHIEF COMPLAINT:  LOW BACK PAIN and Knee Pain (bilateral)      REASON FOR VISIT:   César Lorenz presents to the pain clinic today for initial evaluation and to consider interventional pain management options as indicated for the type and location of the pain the patient is presenting with. HISTORY OF PRESENT ILLNESS:    This is an initial evaluation and consideration for interventional procedures as indicated. Patient is referred to us by Garfield Sellers for consideration for trigger point injections as indicated. At today's evaluation patient endorses pain throughout her posterior shoulder area, superior shoulder area, into the neck. She also has pain along her mid and low back, and along her lumbar area spreading to both posterior hips. She does not have significant radiating or referred upper or lower extremity pain. She endorses aching tightness throbbing constant pain, increasing in severity. She relates that she has history of long-standing fibromyalgia, over 6 years duration. She states she has had numerous trigger point injections of her shoulder back, lumbar spine, mid back areas that have helped significantly. She would like to be considered for these again     Patient has tried a number of medications including Lyrica, Cymbalta, Savella, gabapentin. These have either been ineffective or she has had significant side effects. She is previously been on opioids including Percocet. She is no longer on these, they did not help anyway. She has lost 65 pounds which is helped somewhat. She works out of [employer]. She states she is very proactive with regard to her health issues. She uses Voltaren gel helps.   She endorses pain of her hips, shoulders, along her spine, knees, hands and feet. By review of available medical records, progress note dated April 4, 2018 by Dr. Ida Dawkins is reviewed. History of anxiety. Fibromyalgia. Trigger points have helped. These are done a pain clinic in [place]. Generalized muscle aches and pains and fatigue. Allergies. Asthma. Bipolar disorder, not on medications. Hiatal hernia. Patient has had imaging studies in the past.     Review of pain management clinic records from [facility]. January 17, 2017 is the note. Dr. Peggy Forman. Generalized body pain. ANAI positive, anxiety, bipolar disorder, bladder cancer, cervical cancer, chronic pain syndrome, COPD, eczema, fibromyalgia, history of respiratory failure, sleep apnea, pulmonary embolism in the past, prediabetes. TIA in the past.  Gabapentin, lorazepam.      Imaging is reviewed from [facility]. Right hip without abnormality, January 10, 2017. Pelvic imaging normal January 10, 2017. CT of the abdomen and pelvis December 1, 2014. Mild fibrosis of the lung bases calcified granuloma in spleen. ASSESSMENT/OPTIONS: as follows. We discussed options. Patient has long-standing history of fibromyalgia, with multiple widespread pain areas presented today. She has overlying myofascial pain syndrome and cervicalgia, lumbago. These have apparently been treated very well in the past with trigger point injections. She wishes to continue with these. I believe this is very reasonable. I will set her up for injections, bilaterally at trapezii, rhomboid muscle groups, levator, posterior thoracolumbar paraspinal muscle groups. Also quadratus lumborum bilaterally. This should help her significantly as they have in the past.  I have discussed the risks and benefits, indications, contraindications, and side effects of intended procedure with the patient.   I have used skeleton spine model to describe and discuss the procedure with the patient. I have answered all questions relating to the procedure. Patient understands the nature of the procedure and wishes to proceed. Patient has no further questions. MRI Results (most recent):  No results found for this or any previous visit. PAST MEDICAL HISTORY:   The patient  has a past medical history of Asthma; Bipolar 1 disorder ([address] and Hiatal hernia. PAST SURGICAL HISTORY:   The patient  has a past surgical history that includes hx  section () and hx hysterectomy (). CURRENT MEDICATIONS:   The patient has a current medication list which includes the following prescription(s): guaifenesin-codeine, levofloxacin, benzocaine-menthol, prednisone, menthol-sorbitol, ondansetron, diphenhydramine, diclofenac, melatonin, epinephrine, lorazepam, fluticasone-salmeterol, albuterol, beclomethasone, and acetaminophen. ALLERGIES:     Allergies   Allergen Reactions    Pcn [Penicillins] Anaphylaxis    Strawberry Anaphylaxis    Sulfur Anaphylaxis    Adhesive Tape-Silicones Hives    Depakote [Divalproex] Other (comments)     Paralysis      Thorazine [Chlorpromazine] Other (comments)     Not sure      Toradol [Ketorolac] Seizures    Tramadol Seizures       FAMILY HISTORY:   The patient family history includes Diabetes in her mother; Heart Attack in her mother; Thyroid Disease in her mother. SOCIAL HISTORY:   The patient  reports that she has quit smoking.  She has quit using smokeless tobacco. The patient  reports that she drinks about 0.6 oz of alcohol per week  She      REVIEW OF SYSTEMS:    The patient denies fever, chills, weight loss (Constitutional), rash, itching (Skin), tinnitus, congestion (HENT), blurred vision, photophobia (Eyes), palpitations, orthopnea (Cardiovascular), hemoptysis, wheezing (Respiratory), nausea, vomiting, diarrhea (Gastrointestinal), dysuria, hematuria, urgency (Genitourinary), bowel or bladder incontinence, loss of consciousness (Neurologic), suicidal or homicidal ideation or hallucinations (Psychiatric). Denies swelling, axillary or groin masses (Lymphatic). PHYSICAL EXAM:  VS:   Visit Vitals    /68 (BP 1 Location: Right arm, BP Patient Position: Sitting)    Pulse 97    Temp 97.6 °F (36.4 °C) (Oral)    Resp 16    Ht 4' 10\" (1.473 m)    Wt 61.7 kg (136 lb)    BMI 28.42 kg/m2     General: Well-developed and well-nourished. Body habitus consistent with recorded height and weight and the calculated BMI. Apparent distress due to widespread pain. Head: Normocephalic, atraumatic. Skin: Inspection of the skin reveals no rashes, lesions or infection. CV: Regular rate. No murmurs or rubs noted. No peripheral edema noted. Pulm: Respirations are even and unlabored. Extr: No clubbing, cyanosis, or edema noted. Musculoskeletal:  1. Cervical spine -bilateral decreased range of motion . Paraspinous tenderness bilateral .  There is no scoliosis, asymmetry, or musculoskeletal defect. 2. Thoracic spine -decreased ROM. Paraspinous tenderness along the thoracic paraspinal area . There is no scoliosis, asymmetry, or musculoskeletal defect. 3. Lumbar spine -moderately decreased range of motion all axes . Paraspinous tenderness. SI joints are nontender bilaterally. There is no scoliosis, asymmetry, or musculoskeletal defect. 4. Right upper extremity - Full ROM. 5/5 muscle strength in all muscle groups. No pain or tenderness in shoulder, elbow, wrist, or hand. 5. Left upper extremity - Full ROM. 5/5 muscle strength in all muscle groups. No pain or tenderness in shoulder, elbow, wrist, or hand. 6. Right lower extremity - Full ROM. 5/5 muscle strength in all muscle groups. No pain, tenderness, or swelling in the hip, knee, ankle or foot. 7. Left lower extremity - Full ROM. 5/5 muscle strength in all muscle groups. No pain, tenderness, or swelling in the hip, knee, ankle or foot. Neurological:  1.  Mental Status - Alert, awake and oriented. Speech is clear and appropriate. 2. Cranial Nerves - Extraocular muscles intact bilaterally. Cranial nerves II-XII grossly intact bilaterally. 3. Gait - antalgic   4. Reflexes - 2+ and symmetric throughout. 5. Sensation - Intact to light touch and pin prick. 6. Provocative Tests - Spurlings negative bilaterally. Straight leg raise negative bilaterally. Psychological:  1. Mood and affect - Appropriate. 2. Speech - Appropriate. 3. Though content - Appropriate. 4. Judgment - Appropriate. ASSESSMENT:      ICD-10-CM ICD-9-CM    1. Myofascial pain syndrome M79.1 729.1    2. Cervicalgia M54.2 723.1    3. Chronic bilateral low back pain without sciatica M54.5 724.2     G89.29 338.29    4. Fibromyalgia M79.7 729.1            PLAN:    1.    I have thoroughly discussed the risks and benefits, indications, contraindications, and side effects of any and procedures that were mentioned at today's patient visit. I have used a skeleton model to explain all procedures, as well as to provide added emphasis regarding procedures and as well for patient education purposes. I have answered all questions in great detail, and I have obtained verbal confirmation for all procedures planned with the patient. 3.    I have reviewed in great detail today, when indicated, the patient's MRI and other imaging studies with the patient. I have explained to the patient, when indicated, their condition using both actual recent and relevant images insofar as I am able to obtain actual images. I have used a skeleton model for added emphasis as well as patient education. 4.    I have advised patient to have a primary care provider continue to care for their health maintenance and general medical conditions. 5,    I have placed appropriate referrals to specialty care providers as I have deemed necessary through today's clinical consultation with the patient.   5.    I have explained to the patient that if any significant side effects, issues, problems, concerns, or perceived complications may arise at around the time of the patient's procedures, they should either call the pain management clinic or go to the emergency room immediately for medical provider evaluation. 6.   I have encouraged all patients to call the pain management clinic with any questions or concerns that they may have pertaining to their procedures. DISPOSITION:   The patients condition and plan were discussed at length and all questions were answered. The patient agrees with the plan. A total of 45 minutes was spent with the patient of which over half of the time was spent counseling the patient. Contreras Landin MD 5/2/2018 4:46 PM    Note: Although these clinic notes were documented by the provider at the time of the exam, they have not been proofed and are subject to transcription variance.

## 2018-05-03 ENCOUNTER — OFFICE VISIT (OUTPATIENT)
Dept: FAMILY MEDICINE CLINIC | Age: 48
End: 2018-05-03

## 2018-05-03 ENCOUNTER — DOCUMENTATION ONLY (OUTPATIENT)
Dept: FAMILY MEDICINE CLINIC | Age: 48
End: 2018-05-03

## 2018-05-03 VITALS
HEART RATE: 83 BPM | SYSTOLIC BLOOD PRESSURE: 113 MMHG | RESPIRATION RATE: 18 BRPM | WEIGHT: 137 LBS | TEMPERATURE: 97.3 F | DIASTOLIC BLOOD PRESSURE: 67 MMHG | HEIGHT: 58 IN | BODY MASS INDEX: 28.76 KG/M2 | OXYGEN SATURATION: 98 %

## 2018-05-03 DIAGNOSIS — J18.9 PNEUMONIA DUE TO INFECTIOUS ORGANISM, UNSPECIFIED LATERALITY, UNSPECIFIED PART OF LUNG: Primary | ICD-10-CM

## 2018-05-03 DIAGNOSIS — F43.0 ACUTE STRESS DISORDER: ICD-10-CM

## 2018-05-03 DIAGNOSIS — F32.A DEPRESSION, UNSPECIFIED DEPRESSION TYPE: ICD-10-CM

## 2018-05-03 LAB
BACTERIA SPEC CULT: NORMAL
BACTERIA SPEC CULT: NORMAL
SERVICE CMNT-IMP: NORMAL
SERVICE CMNT-IMP: NORMAL

## 2018-05-03 NOTE — PROGRESS NOTES
Patient was very agitated and angry for some reason during her visit on 5/3/2018. I went out to the [facility] and called her name at 2pm which is the time of her appointment. I greeted her by saying \"How are you doing? \" She did not respond. I told her I was going to check her vision and do an eye test as part of her [address] She didn't seem to want it done so I told her we'll skip it for now. During rooming process in the exam room she did not want to answer questions regarding her recent hospital visit, instead she would just say \"It should be in my chart! I went to [facility]  facitily so it should be in there. \" I tried to go over her medications and again did not want to answer whether she was taking any of her medications listed in her chart. I maintained a calm voice throughout the rooming process.

## 2018-05-03 NOTE — MR AVS SNAPSHOT
[facility] [address] [facility] 
[phone] Patient: Roshni Bustos MRN: [id number] Visit Information Date & Time Provider Department Dept. Phone Encounter #  
 5/3/2018  2:00 PM MD Ines Mendez [phone] [id number] Follow-up Instructions Return if symptoms worsen or fail to improve. Your Appointments 5/17/2018 11:15 AM  
Follow Up with MD Ines Mendez ([address] [facility]) Appt Note: follow up appointment [employer] [employer] [address] [address] [address] [facility]  
  
   
 [address] Upcoming Health Maintenance Date Due Pneumococcal 19-64 Medium Risk (1 of 1 - PPSV23) 3/12/1989 DTaP/Tdap/Td series (1 - Tdap) 3/12/1991 PAP AKA CERVICAL CYTOLOGY 3/12/1991 MEDICARE YEARLY EXAM 4/4/2018 Influenza Age 5 to Adult 8/1/2018 Allergies as of 5/3/2018  Review Complete On: 5/3/2018 By: Blake Workman MD  
  
 Severity Noted Reaction Type Reactions Pcn [Penicillins] High 03/29/2018    Anaphylaxis Jackson High 03/29/2018    Anaphylaxis Sulfur High 03/29/2018    Anaphylaxis Adhesive Tape-silicones  78/39/2923    Hives Depakote [Divalproex]  03/29/2018    Other (comments) Paralysis Thorazine [Chlorpromazine]  03/29/2018    Other (comments) Not sure Toradol [Ketorolac]  03/29/2018    Seizures Tramadol  03/29/2018    Seizures Current Immunizations  Never Reviewed No immunizations on file. Not reviewed this visit You Were Diagnosed With   
  
 Codes Comments Pneumonia due to infectious organism, unspecified laterality, unspecified part of lung    -  Primary ICD-10-CM: J18.9 ICD-9-CM: 136.9, 484.8 Vitals BP Pulse Temp Resp Height(growth percentile) Weight(growth percentile) 113/67 (BP 1 Location: Left arm, BP Patient Position: Sitting) 83 97.3 °F (36.3 °C) (Oral) 18 4' 10\" (1.473 m) 137 lb (62.1 kg) SpO2 BMI OB Status Smoking Status 98% 28.63 kg/m2 Hysterectomy Former Smoker BMI and BSA Data Body Mass Index Body Surface Area  
 28.63 kg/m 2 1.59 m 2 Preferred Pharmacy Pharmacy Name Phone [facility] #[id number]- [address] [facility] [phone] Your Updated Medication List  
  
   
This list is accurate as of 5/3/18  2:58 PM.  Always use your most recent med list.  
  
  
  
  
 acetaminophen 325 mg tablet Commonly known as:  TYLENOL Take 2 Tabs by mouth every four (4) hours as needed. albuterol 90 mcg/actuation inhaler Commonly known as:  PROVENTIL HFA, VENTOLIN HFA, PROAIR HFA Take 1 Puff by inhalation. BENADRYL 25 mg capsule Generic drug:  diphenhydrAMINE Take 25 mg by mouth every six (6) hours as needed. Benzocaine-Menthol 6-10 mg Lozg Commonly known as:  CHLORASEPTIC SORE THROAT  
1 Lozenge by Mucous Membrane route as needed. EPIPEN 0.3 mg/0.3 mL injection Generic drug:  EPINEPHrine  
0.3 mg by IntraMUSCular route once as needed. fluticasone-salmeterol 230-21 mcg/actuation inhaler Commonly known as:  ADVAIR HFA Take 2 Puffs by inhalation two (2) times a day. guaiFENesin-codeine 100-10 mg/5 mL solution Commonly known as:  ROBITUSSIN AC Take 5 mL by mouth every four (4) hours as needed for Cough. Max Daily Amount: 30 mL. levoFLOXacin 750 mg tablet Commonly known as:  Raynald Martinez Take 1 Tab by mouth daily for 4 days. LORazepam 1 mg tablet Commonly known as:  ATIVAN Take 1 Tab by mouth two (2) times a day. Max Daily Amount: 2 mg. Start taking on:  6/4/2018  
  
 melatonin 10 mg Tab Take  by mouth.  
  
 menthol-sorbitol lozenge Commonly known as:  THROAT LOZENGES Take 1 Lozenge by mouth as needed for Pain. ondansetron 4 mg disintegrating tablet Commonly known as:  ZOFRAN ODT Take 1 Tab by mouth every eight (8) hours as needed for Nausea. predniSONE 10 mg tablet Commonly known as:  Rina Villegas Prednisone 10mg tabs: p.o. 4 tabs daily for 2 days then drop to  3 tabs daily for 3 days then drop to  2 tabs daily for 3 days then drop to  1 tab daily for 3 days then stop. Dispense 29 tabs QVAR 80 mcg/actuation [employer] Generic drug:  beclomethasone Take 1 Puff by inhalation two (2) times a day. VOLTAREN 1 % Gel Generic drug:  diclofenac Apply  to affected area four (4) times daily. Follow-up Instructions Return if symptoms worsen or fail to improve. Introducing [facility]! Fany Whitfield introduces [employer] patient portal. Now you can access parts of your medical record, email your doctor's office, and request medication refills online. 1. In your internet browser, go to [email or web address]. [email or web address] 2. Click on the First Time User? Click Here link in the Sign In box. You will see the New Member Sign Up page. 3. Enter your [employer] Access Code exactly as it appears below. You will not need to use this code after youve completed the sign-up process. If you do not sign up before the expiration date, you must request a new code. · [employer] Access Code: [email or web address] Expires: 6/27/2018  8:09 PM 
 
4. Enter the last four digits of your Social Security Number (xxxx) and Date of Birth (mm/dd/yyyy) as indicated and click Submit. You will be taken to the next sign-up page. 5. Create a [employer] ID. This will be your [employer] login ID and cannot be changed, so think of one that is secure and easy to remember. 6. Create a [employer] password. You can change your password at any time. 7. Enter your Password Reset Question and Answer. This can be used at a later time if you forget your password. 8. Enter your e-mail address. You will receive e-mail notification when new information is available in [address] 9. Click Sign Up. You can now view and download portions of your medical record. 10. Click the Download Summary menu link to download a portable copy of your medical information. If you have questions, please visit the Frequently Asked Questions section of the [employer]box website. Remember, [employer]box is NOT to be used for urgent needs. For medical emergencies, dial 911. Now available from your iPhone and Android! Please provide this summary of care documentation to your next provider. Your primary care clinician is listed as Roberto Prado. If you have any questions after today's visit, please call [phone].

## 2018-05-09 ENCOUNTER — PATIENT OUTREACH (OUTPATIENT)
Dept: FAMILY MEDICINE CLINIC | Age: 48
End: 2018-05-09

## 2018-05-09 NOTE — PROGRESS NOTES
Nurse Navigator (NN) attempted to contact patient via telephone call. There was no response. A voicemail message was left requesting a non-emergency return telephone call. Nurse Navigator contact information provided. Disregard outgoing phone call to [facility] on 5-9-18. Patient's family member not contacted.

## 2018-05-10 ENCOUNTER — PATIENT OUTREACH (OUTPATIENT)
Dept: FAMILY MEDICINE CLINIC | Age: 48
End: 2018-05-10

## 2018-05-10 NOTE — PROGRESS NOTES
Nurse Navigator (NN) attempted to contact patient via telephone call. There was no response. A voicemail message was left requesting a non-emergency return telephone call. Nurse Navigator contact information provided.

## 2018-05-11 NOTE — PROGRESS NOTES
Follow Up Telephone Call    Telephone call received from patient     Nurse Navigator spoke with patient via telephone call. Patient related that she is driving so she cannot talk for a long time. Patient related that she is doing well. Patient states that she has found a place to [facility] in [place]. Patient wants to continue to follow up with Dr. Sofie Seo. Patient laughted during the conversation and stats that she feels much better. Nurse Navigator will continue with outreach efforts.

## 2018-05-17 ENCOUNTER — PATIENT OUTREACH (OUTPATIENT)
Dept: FAMILY MEDICINE CLINIC | Age: 48
End: 2018-05-17

## 2018-05-17 NOTE — PROGRESS NOTES
Hospital Follow Up     Patient has follow up appointment scheduled with Dr. Rayna Christine today @ 2:30 pm.     Nurse Navigator spoke with OLAYINKA WHEELER [facility]. Patient confirmed appointment via phone system. EMR Reviewed. Patient did not attend PCP appointment today. Nurse Navigator called patient. Patient states \" I feel like I have been run over by a [employer] truck\". Patient states that the pain management doctor has not been able to get her trigger point injections approved  and she is in a lot of pain. Patient stated, \" That is my pain doctor now. I have to let you go\". Patient disconnected telephone call. Nurse Navigator will follow.

## 2018-05-23 ENCOUNTER — PATIENT OUTREACH (OUTPATIENT)
Dept: FAMILY MEDICINE CLINIC | Age: 48
End: 2018-05-23

## 2018-05-23 NOTE — PROGRESS NOTES
Nurse Navigator spoke with patient via telephone call. Patient states, I am fine, I am ok. Patient reports that she saw her allergist (doctor) yesterday and received allergy shots and that she is feeling much better. Patient states that she is not having any trouble breathing. Patient stated that she does not want to schedule another appointment with Dr. Henry Holland at this time. Patient states she wants to wait until she is moved in to her new home and has everything straightened out.

## 2018-05-27 ENCOUNTER — HOSPITAL ENCOUNTER (EMERGENCY)
Age: 48
Discharge: HOME | End: 2018-05-27
Attending: EMERGENCY MEDICINE
Payer: MEDICARE

## 2018-05-27 ENCOUNTER — APPOINTMENT (OUTPATIENT)
Dept: GENERAL RADIOLOGY | Age: 48
End: 2018-05-27
Attending: EMERGENCY MEDICINE
Payer: MEDICARE

## 2018-05-27 VITALS
SYSTOLIC BLOOD PRESSURE: 96 MMHG | RESPIRATION RATE: 22 BRPM | DIASTOLIC BLOOD PRESSURE: 54 MMHG | WEIGHT: 140 LBS | OXYGEN SATURATION: 96 % | HEIGHT: 58 IN | HEART RATE: 96 BPM | TEMPERATURE: 98.8 F | BODY MASS INDEX: 29.39 KG/M2

## 2018-05-27 DIAGNOSIS — R11.2 NAUSEA VOMITING AND DIARRHEA: Primary | ICD-10-CM

## 2018-05-27 DIAGNOSIS — R19.7 NAUSEA VOMITING AND DIARRHEA: Primary | ICD-10-CM

## 2018-05-27 DIAGNOSIS — R20.2 NUMBNESS AND TINGLING IN BOTH HANDS: ICD-10-CM

## 2018-05-27 DIAGNOSIS — R07.89 ATYPICAL CHEST PAIN: ICD-10-CM

## 2018-05-27 DIAGNOSIS — R20.0 NUMBNESS AND TINGLING IN BOTH HANDS: ICD-10-CM

## 2018-05-27 LAB
ALBUMIN SERPL-MCNC: 3.7 G/DL (ref 3.4–5)
ALBUMIN/GLOB SERPL: 1.2 {RATIO} (ref 0.8–1.7)
ALP SERPL-CCNC: 94 U/L (ref 45–117)
ALT SERPL-CCNC: 35 U/L (ref 13–56)
ANION GAP SERPL CALC-SCNC: 7 MMOL/L (ref 3–18)
APPEARANCE UR: CLEAR
AST SERPL-CCNC: 20 U/L (ref 15–37)
BASOPHILS # BLD: 0 K/UL (ref 0–0.06)
BASOPHILS NFR BLD: 0 % (ref 0–2)
BILIRUB SERPL-MCNC: 0.8 MG/DL (ref 0.2–1)
BILIRUB UR QL: NEGATIVE
BUN SERPL-MCNC: 10 MG/DL (ref 7–18)
BUN/CREAT SERPL: 15 (ref 12–20)
CALCIUM SERPL-MCNC: 8.4 MG/DL (ref 8.5–10.1)
CHLORIDE SERPL-SCNC: 108 MMOL/L (ref 100–108)
CO2 SERPL-SCNC: 27 MMOL/L (ref 21–32)
COLOR UR: YELLOW
CREAT SERPL-MCNC: 0.68 MG/DL (ref 0.6–1.3)
D DIMER PPP FEU-MCNC: 0.3 UG/ML(FEU)
DIFFERENTIAL METHOD BLD: ABNORMAL
EOSINOPHIL # BLD: 0.4 K/UL (ref 0–0.4)
EOSINOPHIL NFR BLD: 4 % (ref 0–5)
ERYTHROCYTE [DISTWIDTH] IN BLOOD BY AUTOMATED COUNT: 14.9 % (ref 11.6–14.5)
GLOBULIN SER CALC-MCNC: 3 G/DL (ref 2–4)
GLUCOSE SERPL-MCNC: 109 MG/DL (ref 74–99)
GLUCOSE UR STRIP.AUTO-MCNC: NEGATIVE MG/DL
HCG UR QL: NEGATIVE
HCT VFR BLD AUTO: 43.3 % (ref 35–45)
HGB BLD-MCNC: 13.8 G/DL (ref 12–16)
HGB UR QL STRIP: NEGATIVE
KETONES UR QL STRIP.AUTO: NEGATIVE MG/DL
LEUKOCYTE ESTERASE UR QL STRIP.AUTO: NEGATIVE
LIPASE SERPL-CCNC: 170 U/L (ref 73–393)
LYMPHOCYTES # BLD: 0.4 K/UL (ref 0.9–3.6)
LYMPHOCYTES NFR BLD: 5 % (ref 21–52)
MCH RBC QN AUTO: 26.9 PG (ref 24–34)
MCHC RBC AUTO-ENTMCNC: 31.9 G/DL (ref 31–37)
MCV RBC AUTO: 84.4 FL (ref 74–97)
MONOCYTES # BLD: 0.3 K/UL (ref 0.05–1.2)
MONOCYTES NFR BLD: 4 % (ref 3–10)
NEUTS SEG # BLD: 7.9 K/UL (ref 1.8–8)
NEUTS SEG NFR BLD: 87 % (ref 40–73)
NITRITE UR QL STRIP.AUTO: NEGATIVE
PH UR STRIP: 7.5 [PH] (ref 5–8)
PLATELET # BLD AUTO: 296 K/UL (ref 135–420)
PMV BLD AUTO: 9.6 FL (ref 9.2–11.8)
POTASSIUM SERPL-SCNC: 3.8 MMOL/L (ref 3.5–5.5)
PROT SERPL-MCNC: 6.7 G/DL (ref 6.4–8.2)
PROT UR STRIP-MCNC: NEGATIVE MG/DL
RBC # BLD AUTO: 5.13 M/UL (ref 4.2–5.3)
SODIUM SERPL-SCNC: 142 MMOL/L (ref 136–145)
SP GR UR REFRACTOMETRY: 1.01 (ref 1–1.03)
TROPONIN I SERPL-MCNC: <0.02 NG/ML (ref 0–0.04)
TROPONIN I SERPL-MCNC: <0.02 NG/ML (ref 0–0.04)
UROBILINOGEN UR QL STRIP.AUTO: 0.2 EU/DL (ref 0.2–1)
WBC # BLD AUTO: 9.1 K/UL (ref 4.6–13.2)

## 2018-05-27 PROCEDURE — 83690 ASSAY OF LIPASE: CPT | Performed by: EMERGENCY MEDICINE

## 2018-05-27 PROCEDURE — 74011000250 HC RX REV CODE- 250: Performed by: EMERGENCY MEDICINE

## 2018-05-27 PROCEDURE — 96374 THER/PROPH/DIAG INJ IV PUSH: CPT

## 2018-05-27 PROCEDURE — 71045 X-RAY EXAM CHEST 1 VIEW: CPT

## 2018-05-27 PROCEDURE — 99285 EMERGENCY DEPT VISIT HI MDM: CPT

## 2018-05-27 PROCEDURE — 74011250637 HC RX REV CODE- 250/637: Performed by: EMERGENCY MEDICINE

## 2018-05-27 PROCEDURE — 93005 ELECTROCARDIOGRAM TRACING: CPT

## 2018-05-27 PROCEDURE — 85025 COMPLETE CBC W/AUTO DIFF WBC: CPT | Performed by: EMERGENCY MEDICINE

## 2018-05-27 PROCEDURE — 74011250636 HC RX REV CODE- 250/636: Performed by: EMERGENCY MEDICINE

## 2018-05-27 PROCEDURE — 96375 TX/PRO/DX INJ NEW DRUG ADDON: CPT

## 2018-05-27 PROCEDURE — 80053 COMPREHEN METABOLIC PANEL: CPT | Performed by: EMERGENCY MEDICINE

## 2018-05-27 PROCEDURE — 96361 HYDRATE IV INFUSION ADD-ON: CPT

## 2018-05-27 PROCEDURE — 84484 ASSAY OF TROPONIN QUANT: CPT | Performed by: EMERGENCY MEDICINE

## 2018-05-27 PROCEDURE — 81003 URINALYSIS AUTO W/O SCOPE: CPT | Performed by: EMERGENCY MEDICINE

## 2018-05-27 PROCEDURE — 85379 FIBRIN DEGRADATION QUANT: CPT | Performed by: EMERGENCY MEDICINE

## 2018-05-27 PROCEDURE — 81025 URINE PREGNANCY TEST: CPT | Performed by: EMERGENCY MEDICINE

## 2018-05-27 RX ORDER — GUAIFENESIN 100 MG/5ML
324 LIQUID (ML) ORAL
Status: COMPLETED | OUTPATIENT
Start: 2018-05-27 | End: 2018-05-27

## 2018-05-27 RX ORDER — ONDANSETRON 4 MG/1
TABLET, ORALLY DISINTEGRATING ORAL
Qty: 10 TAB | Refills: 0 | Status: SHIPPED | OUTPATIENT
Start: 2018-05-27 | End: 2018-09-23

## 2018-05-27 RX ORDER — ACETAMINOPHEN 500 MG
1000 TABLET ORAL
Status: COMPLETED | OUTPATIENT
Start: 2018-05-27 | End: 2018-05-27

## 2018-05-27 RX ORDER — LIDOCAINE HYDROCHLORIDE 20 MG/ML
15 SOLUTION OROPHARYNGEAL AS NEEDED
Status: DISCONTINUED | OUTPATIENT
Start: 2018-05-27 | End: 2018-05-27 | Stop reason: HOSPADM

## 2018-05-27 RX ORDER — FAMOTIDINE 10 MG/ML
20 INJECTION INTRAVENOUS
Status: COMPLETED | OUTPATIENT
Start: 2018-05-27 | End: 2018-05-27

## 2018-05-27 RX ORDER — ONDANSETRON 2 MG/ML
4 INJECTION INTRAMUSCULAR; INTRAVENOUS
Status: COMPLETED | OUTPATIENT
Start: 2018-05-27 | End: 2018-05-27

## 2018-05-27 RX ADMIN — ACETAMINOPHEN 1000 MG: 500 TABLET, FILM COATED ORAL at 15:08

## 2018-05-27 RX ADMIN — ASPIRIN 81 MG 324 MG: 81 TABLET ORAL at 15:09

## 2018-05-27 RX ADMIN — SODIUM CHLORIDE 1000 ML: 900 INJECTION, SOLUTION INTRAVENOUS at 13:19

## 2018-05-27 RX ADMIN — LIDOCAINE HYDROCHLORIDE 15 ML: 20 SOLUTION ORAL; TOPICAL at 15:15

## 2018-05-27 RX ADMIN — ONDANSETRON 4 MG: 2 INJECTION INTRAMUSCULAR; INTRAVENOUS at 13:19

## 2018-05-27 RX ADMIN — FAMOTIDINE 20 MG: 10 INJECTION INTRAVENOUS at 15:10

## 2018-05-27 NOTE — ED NOTES
3:17 PM took signout from Dr Jayme Conroy, to follow up on urine then discharge patient. UA and hcg neg. Informed pt and discharged. Pt had no questions. 3:48 PM Pt requesting to talk to me again. She says she doesn't understand what's going on, what's causing her pain. Dr Malcolm Salcedo note, she felt c/w gastroenteritis. Pt had pain, she localized to epigastric, around xyphoid proc, with vomintg and diarrhea starting around 6 am this morning. Pain is not similar to fibromyalgia, or reflux assoc with her hiatal hernia. She's concerned about what it could be. She's had 3 PEs, but these presented with SOB, not CP. No cardiac hx. No cough, syncope, or urinary complaints. No ENT complaints  Vitals stable pulse 105, not hypoxic on RA  Lungs clr  No heart murmur  Intact pulses x4  abd and chest, no specific tenderness that reproduces pain. MDM:  Has tachycardia, will get d-dimer. gestault against PE. Esophagitis more likely. Pt insists it's not  Doubt ACS, will get second trop 3 hrs from first  Cxr, doubt pneumothorax or pneumonia    Pt is frustrated, sarcastic, angry, defensive, insists she's \"well educated\" and knows her body. I discussed with her we will evaluate for emergent conditions including the above. She understands this approach. She understands we may not find an exact explanation for her pain, but will evaluate for emergent causes. 5:14 PM  Trop neg  ddimer neg  cxr no acute process read by me. I discussed these result with pt. She's quiet and NAD with stable vitals resting in bed. Reiterated diseases which have been excluded. She still feels frustrated, says \"this is more than discomfort\"  Says \"I have fibromyalgia, I live with pain every day of my life,\" says \"It's unacceptable. \"  At this point I think her workup has been appropraite and she'll be discharged. No pna ptx pe or acs. HEART score 1 for nonspec changes on ekg.

## 2018-05-27 NOTE — ED NOTES
3:08 Pt care assumed from Dr. Gema Cornell , ED provider. Pt complaint(s), current treatment plan, progression and available diagnostic results have been discussed thoroughly.    Intended disposition: Home   Pending results: Urine     Chest XR  Impression: No acute process  Read by: Dr. Dale Hooper

## 2018-05-27 NOTE — ED PROVIDER NOTES
EMERGENCY DEPARTMENT HISTORY AND PHYSICAL EXAM    1:02 PM      Date: 5/27/2018  Patient Name: Jocelynn Faria    History of Presenting Illness     Chief Complaint   Patient presents with    Chest Pain         History Provided By: Patient    Chief Complaint: Chest Pain   Duration:  6AM Today   Timing:  Constant  Location: Medial Chest   Quality: Pressure  Severity: 10 out of 10  Modifying Factors: None   Associated Symptoms: Associated symptoms include N/V/D, along with bilateral \"hand numbness. \" Patient denies other associated sympotms. Additional History (Context): Jocelynn Faria is a 50 y.o. female with PMHx of Asthma, Hiatal Hernia, Pulmonary embolism, TIA, Fibromyalgia, PNA, Anxiety, and Bipolar 1 Disorder who presents with medial chest pain onset 6AM this morning. Associated symptoms include nausea, 2 episodes of vomiting, and 4-5 episodes of diarrhea. Patient also reports bilateral \"hand numbness. \" No other concerns were expressed at this time. PCP: Giles Venegas MD    Current Facility-Administered Medications   Medication Dose Route Frequency Provider Last Rate Last Dose    lidocaine (XYLOCAINE) 2 % viscous solution 15 mL  15 mL Mouth/Throat PRN Eliu Bryson DO         Current Outpatient Prescriptions   Medication Sig Dispense Refill    ondansetron (ZOFRAN ODT) 4 mg disintegrating tablet Take 1-2 tablets every 6-8 hours as needed for nausea and vomiting. 10 Tab 0    guaiFENesin-codeine (ROBITUSSIN AC) 100-10 mg/5 mL solution Take 5 mL by mouth every four (4) hours as needed for Cough. Max Daily Amount: 30 mL. 118 mL 0    acetaminophen (TYLENOL) 325 mg tablet Take 2 Tabs by mouth every four (4) hours as needed. 30 Tab 0    Benzocaine-Menthol (CHLORASEPTIC SORE THROAT) 6-10 mg lozg 1 Lozenge by Mucous Membrane route as needed.  18 Lozenge 0    predniSONE (DELTASONE) 10 mg tablet Prednisone 10mg tabs: p.o.  4 tabs daily for 2 days then drop to   3 tabs daily for 3 days then drop to 2 tabs daily for 3 days then drop to   1 tab daily for 3 days then stop. Dispense 29 tabs 29 Tab 0    menthol-sorbitol (THROAT LOZENGES) lozenge Take 1 Lozenge by mouth as needed for Pain. 10 Lozenge 0    diphenhydrAMINE (BENADRYL) 25 mg capsule Take 25 mg by mouth every six (6) hours as needed.  diclofenac (VOLTAREN) 1 % gel Apply  to affected area four (4) times daily.  melatonin 10 mg tab Take  by mouth.  EPINEPHrine (EPIPEN) 0.3 mg/0.3 mL injection 0.3 mg by IntraMUSCular route once as needed.  [START ON 2018] LORazepam (ATIVAN) 1 mg tablet Take 1 Tab by mouth two (2) times a day. Max Daily Amount: 2 mg. 60 Tab 0    fluticasone-salmeterol (ADVAIR HFA) 230-21 mcg/actuation inhaler Take 2 Puffs by inhalation two (2) times a day.  albuterol (PROVENTIL HFA, VENTOLIN HFA, PROAIR HFA) 90 mcg/actuation inhaler Take 1 Puff by inhalation.  beclomethasone (QVAR) 80 mcg/actuation aero Take 1 Puff by inhalation two (2) times a day. Past History     Past Medical History:  Past Medical History:   Diagnosis Date    Anxiety     Asthma     Bipolar 1 disorder ([facility] [address]     Fibromyalgia     Hiatal hernia     PE (pulmonary thromboembolism) (HCC)     Pneumonia     TIA (transient ischemic attack)        Past Surgical History:  Past Surgical History:   Procedure Laterality Date    HX  SECTION      HX HYSTERECTOMY         Family History:  Family History   Problem Relation Age of Onset    Thyroid Disease Mother     Diabetes Mother     Heart Attack Mother        Social History:  Social History   Substance Use Topics    Smoking status: Former Smoker    Smokeless tobacco: Former User    Alcohol use 0.6 oz/week     1 Shots of liquor per week       Allergies:   Allergies   Allergen Reactions    Pcn [Penicillins] Anaphylaxis    Strawberry Anaphylaxis    Sulfur Anaphylaxis    Adhesive Tape-Silicones Hives    Depakote [Divalproex] Other (comments)     Paralysis  Seroquel [Quetiapine] Rash    Thorazine [Chlorpromazine] Other (comments)     Not sure      Toradol [Ketorolac] Seizures    Tramadol Seizures         Review of Systems     Review of Systems   Constitutional: Negative for chills and fever. HENT: Negative for ear pain and sore throat. Eyes: Negative for pain and visual disturbance. Respiratory: Negative for cough and shortness of breath. Cardiovascular: Positive for chest pain. Negative for palpitations. Gastrointestinal: Positive for diarrhea, nausea and vomiting. Negative for abdominal pain. Genitourinary: Negative for flank pain. Musculoskeletal: Negative for back pain and neck pain. Neurological: Positive for numbness (bilateral hands). Negative for syncope and headaches. Psychiatric/Behavioral: Negative for agitation. The patient is not nervous/anxious. Physical Exam     Visit Vitals    /64    Pulse 100    Temp 98.8 °F (37.1 °C)    Resp 15    Ht 4' 10\" (1.473 m)    Wt 63.5 kg (140 lb)    SpO2 97%    BMI 29.26 kg/m2     Physical Exam   Constitutional: She is oriented to person, place, and time. She appears well-developed and well-nourished. HENT:   Head: Normocephalic and atraumatic. Mouth/Throat: Oropharynx is clear and moist.   Eyes: Pupils are equal, round, and reactive to light. No scleral icterus. Neck: Neck supple. No tracheal deviation present. Cardiovascular: Regular rhythm and intact distal pulses. Tachycardia present. No murmur heard. Pulmonary/Chest: Effort normal and breath sounds normal. No respiratory distress. Abdominal: Soft. There is tenderness. Mild diffuse abdominal tenderness to palpation    Musculoskeletal: Normal range of motion. She exhibits no edema or deformity. Neurological: She is alert and oriented to person, place, and time. No gross neuro deficit   Skin: Skin is warm and dry. No rash noted. She is not diaphoretic. Psychiatric: She exhibits a depressed mood. Nursing note and vitals reviewed. Diagnostic Study Results     Labs -  Labs Reviewed   CBC WITH AUTOMATED DIFF - Abnormal; Notable for the following:        Result Value    RDW 14.9 (*)     NEUTROPHILS 87 (*)     LYMPHOCYTES 5 (*)     ABS. LYMPHOCYTES 0.4 (*)     All other components within normal limits   METABOLIC PANEL, COMPREHENSIVE - Abnormal; Notable for the following:     Glucose 109 (*)     Calcium 8.4 (*)     All other components within normal limits   LIPASE   URINALYSIS W/ RFLX MICROSCOPIC   HCG URINE, QL   TROPONIN I       Radiologic Studies -   No orders to display         Medical Decision Making   I am the first provider for this patient. I reviewed the vital signs, available nursing notes, past medical history, past surgical history, family history and social history. Vital Signs-Reviewed the patient's vital signs. Pulse Oximetry Analysis -  97% on room air (Interpretation)    Cardiac Monitor:  Rate: 106bpm  Rhythm:  Sinus Tachycardia     EKG: Interpreted by the EP. Time 12:34PM  Sinus Tachycardia at a rate of 110. Normal axis; Intervals WNL; Non-specific ST-Twave abnormalities in the lateral leads; When compared with EKG of 4/27/18, no significant change was found, including the rate. Records Reviewed: Nursing Notes and Triage notes  (Time of Review: 1:02 PM)    ED Course: Progress Notes, Reevaluation, and Consults:  ED Course       Provider Notes (Medical Decision Making):     DDX: Gastroenteritis, Viral vs. Bacterial infection, food-borne illness, dehydration, electrolyte abnormality, musculoskeletal pain, GERD, esophagitis. 50 y.o. female with noted past medical history who presented with acute onset of multiple episodes of N/V/D starting at 6AM. Most concerning for gastroenteritis, most likely viral. Will provide nausea medication and hydration. Patient also c/o atypical chest pain, likely from multiple episodes of vomiting.      The differential above was considered. The patient was given:  Medications   lidocaine (XYLOCAINE) 2 % viscous solution 15 mL (not administered)   sodium chloride 0.9 % bolus infusion 1,000 mL (1,000 mL IntraVENous New Bag 5/27/18 1319)   ondansetron (ZOFRAN) injection 4 mg (4 mg IntraVENous Given 5/27/18 1319)   acetaminophen (TYLENOL) tablet 1,000 mg (1,000 mg Oral Given 5/27/18 1508)   aspirin chewable tablet 324 mg (324 mg Oral Given 5/27/18 1509)   famotidine (PF) (PEPCID) injection 20 mg (20 mg IntraVENous Given 5/27/18 1510)     Diagnostics notable for no significant abnormalities, negative Troponin, no ischemic changes on EKG, and heart score low-risk for MACE.     3:00 PM : Pt care transferred to Dr. Sil Cedeno  ,ED provider. History of patient complaint(s), available diagnostic reports and current treatment plan has been discussed thoroughly. Bedside rounding on patient occured : yes . Intended disposition of patient : Home with Zofran medication  Pending diagnostics reports and/or labs (please list): Urine Studies        Diagnosis     Clinical Impression:   1. Nausea vomiting and diarrhea    2. Atypical chest pain    3. Numbness and tingling in both hands        Disposition: Discharge    Follow-up Information     Follow up With Details Comments Contact [facility] EMERGENCY DEPT  If symptoms worsen [address] [place], [place] [address] [phone]    Raquel Moses MD Schedule an appointment as soon as possible for a visit  [address] [facility]  [phone]             Patient's Medications   Start Taking    ONDANSETRON (ZOFRAN ODT) 4 MG DISINTEGRATING TABLET    Take 1-2 tablets every 6-8 hours as needed for nausea and vomiting. Continue Taking    ACETAMINOPHEN (TYLENOL) 325 MG TABLET    Take 2 Tabs by mouth every four (4) hours as needed. ALBUTEROL (PROVENTIL HFA, VENTOLIN HFA, PROAIR HFA) 90 MCG/ACTUATION INHALER    Take 1 Puff by inhalation. BECLOMETHASONE (QVAR) 80 MCG/ACTUATION AERO    Take 1 Puff by inhalation two (2) times a day. BENZOCAINE-MENTHOL (CHLORASEPTIC SORE THROAT) 6-10 MG LOZG    1 Lozenge by Mucous Membrane route as needed. DICLOFENAC (VOLTAREN) 1 % GEL    Apply  to affected area four (4) times daily. DIPHENHYDRAMINE (BENADRYL) 25 MG CAPSULE    Take 25 mg by mouth every six (6) hours as needed. EPINEPHRINE (EPIPEN) 0.3 MG/0.3 ML INJECTION    0.3 mg by IntraMUSCular route once as needed. FLUTICASONE-SALMETEROL (ADVAIR HFA) 230-21 MCG/ACTUATION INHALER    Take 2 Puffs by inhalation two (2) times a day. GUAIFENESIN-CODEINE (ROBITUSSIN AC) 100-10 MG/5 ML SOLUTION    Take 5 mL by mouth every four (4) hours as needed for Cough. Max Daily Amount: 30 mL. LORAZEPAM (ATIVAN) 1 MG TABLET    Take 1 Tab by mouth two (2) times a day. Max Daily Amount: 2 mg. MELATONIN 10 MG TAB    Take  by mouth. MENTHOL-SORBITOL (THROAT LOZENGES) LOZENGE    Take 1 Lozenge by mouth as needed for Pain. PREDNISONE (DELTASONE) 10 MG TABLET    Prednisone 10mg tabs: p.o.  4 tabs daily for 2 days then drop to   3 tabs daily for 3 days then drop to   2 tabs daily for 3 days then drop to   1 tab daily for 3 days then stop. Dispense 29 tabs   These Medications have changed    No medications on file   Stop Taking    ONDANSETRON (ZOFRAN ODT) 4 MG DISINTEGRATING TABLET    Take 1 Tab by mouth every eight (8) hours as needed for Nausea.     _______________________________  Claudia Lange acting as a scribe for and in the presence of Juana Garcia DO      May 27, 2018 at 1:02 PM       Provider Attestation:      I personally performed the services described in the documentation, reviewed the documentation, as recorded by the scribe in my presence, and it accurately and completely records my words and actions.  May 27, 2018 at 1:02 PM - Juana Garcia DO

## 2018-05-28 LAB
ATRIAL RATE: 110 BPM
CALCULATED P AXIS, ECG09: 73 DEGREES
CALCULATED R AXIS, ECG10: 62 DEGREES
CALCULATED T AXIS, ECG11: 28 DEGREES
DIAGNOSIS, 93000: NORMAL
P-R INTERVAL, ECG05: 126 MS
Q-T INTERVAL, ECG07: 328 MS
QRS DURATION, ECG06: 74 MS
QTC CALCULATION (BEZET), ECG08: 443 MS
VENTRICULAR RATE, ECG03: 110 BPM

## 2018-05-30 ENCOUNTER — PATIENT OUTREACH (OUTPATIENT)
Dept: FAMILY MEDICINE CLINIC | Age: 48
End: 2018-05-30

## 2018-05-30 NOTE — PROGRESS NOTES
Follow Up Telephone Call     Nurse Navigator spoke with patient via telephone call. Patient expressed displeasure re: her recent ED visit. Nurse Navigator referred patient to patient advocate at the hospital, Director of nurses, or hospital executives to share her feelings. Patient stated that she does not want to follow up with Dr. Sofie Seo at this time and that she may call her insurance and try to change to another health care organization. Patient stated that she has not had Zofran filled. Patient states that when she is under a lot of pressure her chest will hurt. Nurse Navigator encouraged patient to follow up at ED. Patient states that they would just do he same tests etc     Nurse Navigator contact information provided should paitent have any needs in terms of scheduling etc.  Patient asked to call PCP office for medical follow up/concerns.

## 2018-05-31 ENCOUNTER — OFFICE VISIT (OUTPATIENT)
Dept: PAIN MANAGEMENT | Age: 48
End: 2018-05-31

## 2018-05-31 VITALS
DIASTOLIC BLOOD PRESSURE: 82 MMHG | SYSTOLIC BLOOD PRESSURE: 120 MMHG | TEMPERATURE: 98.6 F | HEIGHT: 58 IN | RESPIRATION RATE: 22 BRPM | WEIGHT: 137.8 LBS | BODY MASS INDEX: 28.92 KG/M2 | HEART RATE: 96 BPM

## 2018-05-31 DIAGNOSIS — M79.18 MYOFASCIAL PAIN SYNDROME: Primary | ICD-10-CM

## 2018-05-31 DIAGNOSIS — M54.50 CHRONIC BILATERAL LOW BACK PAIN WITHOUT SCIATICA: ICD-10-CM

## 2018-05-31 DIAGNOSIS — M54.2 CERVICALGIA: ICD-10-CM

## 2018-05-31 DIAGNOSIS — M79.7 FIBROMYALGIA: ICD-10-CM

## 2018-05-31 DIAGNOSIS — G89.29 CHRONIC BILATERAL LOW BACK PAIN WITHOUT SCIATICA: ICD-10-CM

## 2018-05-31 RX ORDER — BUPIVACAINE HYDROCHLORIDE 5 MG/ML
15 INJECTION, SOLUTION PERINEURAL ONCE
Qty: 15 ML | Refills: 0 | Status: SHIPPED | OUTPATIENT
Start: 2018-05-31 | End: 2018-05-31

## 2018-05-31 NOTE — MR AVS SNAPSHOT
[address] [facility] [address] 
[phone] Patient: Sara Parrish MRN: [id number] Visit Information Date & Time Provider Department Dept. Phone Encounter #  
 5/31/2018  3:45 PM Torrie Valenzuela MD [facility] [address] [facility] [phone] Your Appointments 6/13/2018  2:30 PM  
PROCEDURE with Torrie Valenzuela MD  
[address] [facility] [address] [facility]) Appt Note: TPI / PT NEEDS H&P ***NO ORDERS WHEN SCHED  
 [address] [facility] [address] [phone] [facility] 4[address] Upcoming Health Maintenance Date Due Pneumococcal 19-64 Medium Risk (1 of 1 - PPSV23) 3/12/1989 DTaP/Tdap/Td series (1 - Tdap) 3/12/1991 PAP AKA CERVICAL CYTOLOGY 3/12/1991 MEDICARE YEARLY EXAM 4/4/2018 Influenza Age 5 to Adult 8/1/2018 Allergies as of 5/31/2018  Review Complete On: 5/31/2018 By: Chelsie Shannon LPN Severity Noted Reaction Type Reactions Pcn [Penicillins] High 03/29/2018    Anaphylaxis [place] High 03/29/2018    Anaphylaxis Sulfur High 03/29/2018    Anaphylaxis Adhesive Tape-silicones  86/08/0429    Hives Depakote [Divalproex]  03/29/2018    Other (comments) Paralysis Seroquel [Quetiapine]  05/27/2018    Rash Thorazine [Chlorpromazine]  03/29/2018    Other (comments) Not sure Toradol [Ketorolac]  03/29/2018    Seizures Tramadol  03/29/2018    Seizures Current Immunizations  Never Reviewed No immunizations on file. Not reviewed this visit Vitals BP Pulse Temp Resp Height(growth percentile) Weight(growth percentile) 120/82 (BP 1 Location: Left arm, BP Patient Position: Sitting) 96 98.6 °F (37 °C) (Oral) 22 4' 10\" (1.473 m) 137 lb 12.8 oz (62.5 kg) BMI OB Status Smoking Status 28.8 kg/m2 Hysterectomy Former Smoker Vitals History BMI and BSA Data Body Mass Index Body Surface Area  
 28.8 kg/m 2 1.6 m 2 Preferred Pharmacy Pharmacy Name Phone [id number] [address] [place] Zuniga [phone] Your Updated Medication List  
  
   
This list is accurate as of 5/31/18  4:39 PM.  Always use your most recent med list.  
  
  
  
  
 acetaminophen 325 mg tablet Commonly known as:  TYLENOL Take 2 Tabs by mouth every four (4) hours as needed. albuterol 90 mcg/actuation inhaler Commonly known as:  PROVENTIL HFA, VENTOLIN HFA, PROAIR HFA Take 1 Puff by inhalation. BENADRYL 25 mg capsule Generic drug:  diphenhydrAMINE Take 25 mg by mouth every six (6) hours as needed. Benzocaine-Menthol 6-10 mg Lozg Commonly known as:  CHLORASEPTIC SORE THROAT  
1 Lozenge by Mucous Membrane route as needed. EPIPEN 0.3 mg/0.3 mL injection Generic drug:  EPINEPHrine  
0.3 mg by IntraMUSCular route once as needed. fluticasone-salmeterol 230-21 mcg/actuation inhaler Commonly known as:  ADVAIR HFA Take 2 Puffs by inhalation two (2) times a day. guaiFENesin-codeine 100-10 mg/5 mL solution Commonly known as:  ROBITUSSIN AC Take 5 mL by mouth every four (4) hours as needed for Cough. Max Daily Amount: 30 mL. LORazepam 1 mg tablet Commonly known as:  ATIVAN Take 1 Tab by mouth two (2) times a day. Max Daily Amount: 2 mg. Start taking on:  6/4/2018  
  
 melatonin 10 mg Tab Take  by mouth.  
  
 menthol-sorbitol lozenge Commonly known as:  THROAT LOZENGES Take 1 Lozenge by mouth as needed for Pain. ondansetron 4 mg disintegrating tablet Commonly known as:  ZOFRAN ODT Take 1-2 tablets every 6-8 hours as needed for nausea and vomiting. predniSONE 10 mg tablet Commonly known as:  Ancel Clock Prednisone 10mg tabs: p.o. 4 tabs daily for 2 days then drop to  3 tabs daily for 3 days then drop to  2 tabs daily for 3 days then drop to  1 tab daily for 3 days then stop. Dispense 29 tabs QVAR 80 mcg/actuation [employer] Generic drug:  beclomethasone Take 1 Puff by inhalation two (2) times a day. SPIRIVA WITH HANDIHALER 18 mcg inhalation capsule Generic drug:  tiotropium Take 2 Caps by inhalation daily. VOLTAREN 1 % Gel Generic drug:  diclofenac Apply  to affected area four (4) times daily. Patient Instructions Post Injection Instructions If you develop any abnormal symptoms, such as itching, swelling, redness, rash, or shortness of breath, please call our office. Normally, these are temporary symptoms, which resolve within several hours to a day, but our office is more than happy to answer any questions you may have. The provider would like you to observe the injection area for redness, swelling, or increased heat. If any or all of these reactions occur, please call our office as soon as possible. This reaction may indicate the first signs of infection. Although very rare, it is  best if caught early. I have reviewed these instructions and the patient verbalizes understanding. Introducing [facility]! Zeynep Bentley introduces [employer] patient portal. Now you can access parts of your medical record, email your doctor's office, and request medication refills online. 1. In your internet browser, go to [email or web address] 2. Click on the First Time User? Click Here link in the Sign In box. You will see the New Member Sign Up page. 3. Enter your [employer] Access Code exactly as it appears below. You will not need to use this code after youve completed the sign-up process. If you do not sign up before the expiration date, you must request a new code. · [employer] Access Code: [email or web address] Expires: 6/27/2018  8:09 PM 
 
4.  Enter the last four digits of your Social Security Number (xxxx) and Date of Birth (mm/dd/yyyy) as indicated and click Submit. You will be taken to the next sign-up page. 5. Create a [employer] ID. This will be your [employer] login ID and cannot be changed, so think of one that is secure and easy to remember. 6. Create a [employer] password. You can change your password at any time. 7. Enter your Password Reset Question and Answer. This can be used at a later time if you forget your password. 8. Enter your e-mail address. You will receive e-mail notification when new information is available in [address] 9. Click Sign Up. You can now view and download portions of your medical record. 10. Click the Download Summary menu link to download a portable copy of your medical information. If you have questions, please visit the Frequently Asked Questions section of the [employer] website. Remember, [employer] is NOT to be used for urgent needs. For medical emergencies, dial 911. Now available from your iPhone and Android! Please provide this summary of care documentation to your next provider. Your primary care clinician is listed as Roberto Prado. If you have any questions after today's visit, please call [phone].

## 2018-05-31 NOTE — PROGRESS NOTES
[facility] for Pain Management  Interventional Pain Management Consultation History & Physical    PATIENT NAME:  Roberto Mckeon OF BIRTH:   1970    DATE OF SERVICE:   5/31/2018      CHIEF COMPLAINT:  Injection      REASON FOR VISIT:   Ash Sheets presents to the pain clinic today for follow on evaluation and to consider interventional pain management options as indicated for the type and location of the pain the patient is presenting with. HISTORY OF PRESENT ILLNESS:    Patient presents for follow on evaluation and to consider interventional procedures as indicated. I had originally seen this very pleasant patient May 2, 2018. I found her to have signs and symptoms, as well as exam and imaging evidence suggestive of myofascial pain syndrome, cervicalgia, lumbago, fibromyalgia. I noted that she had had previous sets of trigger point injections that helped her pain syndromes considerably. I noted several muscle groups including bilateral trapezii, rhomboid muscle groups, levator muscle groups, posterior thoracolumbar paraspinal muscle groups, quadratus lumborum muscle groups, bilateral, that exhibited tender pressure points to palpation, muscle taut bands, myofascial pain points that would be amenable to trigger point injections. I set her up for these injections. Patient continues to have myofascial related pain and tenderness of the muscle groups as noted above. We will plan trigger point injections of these muscle groups today. ASSESSMENT/OPTIONS: as follows. Plan trigger point injections as noted above. I have discussed the risks and benefits, indications, contraindications, and side effects of intended procedure with the patient. I have used skeleton spine model to describe and discuss the procedure with the patient. I have answered all questions relating to the procedure.   Patient understands the nature of the procedure and wishes to proceed. Patient has no further questions. I will also provide patient with prescription for H wave therapy. From initial visit, May 2, 2018 as follows-   HISTORY OF PRESENT ILLNESS:    This is an initial evaluation and consideration for interventional procedures as indicated. Patient is referred to us by Shea Araujo for consideration for trigger point injections as indicated. At today's evaluation patient endorses pain throughout her posterior shoulder area, superior shoulder area, into the neck. She also has pain along her mid and low back, and along her lumbar area spreading to both posterior hips. She does not have significant radiating or referred upper or lower extremity pain. She endorses aching tightness throbbing constant pain, increasing in severity. She relates that she has history of long-standing fibromyalgia, over 6 years duration. She states she has had numerous trigger point injections of her shoulder back, lumbar spine, mid back areas that have helped significantly. She would like to be considered for these again     Patient has tried a number of medications including Lyrica, Cymbalta, Savella, gabapentin. These have either been ineffective or she has had significant side effects. She is previously been on opioids including Percocet. She is no longer on these, they did not help anyway. She has lost 65 pounds which is helped somewhat. She works out of [employer]. She states she is very proactive with regard to her health issues. She uses Voltaren gel helps. She endorses pain of her hips, shoulders, along her spine, knees, hands and feet. By review of available medical records, progress note dated April 4, 2018 by Dr. Suleiman Troy is reviewed. History of anxiety. Fibromyalgia. Trigger points have helped. These are done a pain clinic in [place]. Generalized muscle aches and pains and fatigue. Allergies. Asthma. Bipolar disorder, not on medications. Hiatal hernia. Patient has had imaging studies in the past.     Review of pain management clinic records from [facility]. January 17, 2017 is the note. Dr. Dougie Luis. Generalized body pain. ANAI positive, anxiety, bipolar disorder, bladder cancer, cervical cancer, chronic pain syndrome, COPD, eczema, fibromyalgia, history of respiratory failure, sleep apnea, pulmonary embolism in the past, prediabetes. TIA in the past.  Gabapentin, lorazepam.      Imaging is reviewed from [facility]. Right hip without abnormality, January 10, 2017. Pelvic imaging normal January 10, 2017. CT of the abdomen and pelvis December 1, 2014. Mild fibrosis of the lung bases calcified granuloma in spleen.            ASSESSMENT/OPTIONS: as follows. We discussed options. Patient has long-standing history of fibromyalgia, with multiple widespread pain areas presented today. She has overlying myofascial pain syndrome and cervicalgia, lumbago. These have apparently been treated very well in the past with trigger point injections. She wishes to continue with these. I believe this is very reasonable. I will set her up for injections, bilaterally at trapezii, rhomboid muscle groups, levator, posterior thoracolumbar paraspinal muscle groups. Also quadratus lumborum bilaterally. This should help her significantly as they have in the past.  I have discussed the risks and benefits, indications, contraindications, and side effects of intended procedure with the patient. I have used skeleton spine model to describe and discuss the procedure with the patient. I have answered all questions relating to the procedure. Patient understands the nature of the procedure and wishes to proceed. Patient has no further questions. MRI Results (most recent):  No results found for this or any previous visit. PAST MEDICAL HISTORY:   The patient  has a past medical history of Anxiety;  Asthma; Bipolar 1 disorder ([facility] [address] Fibromyalgia; Hiatal hernia; PE (pulmonary thromboembolism) ([facility] [address] Pneumonia; and TIA (transient ischemic attack). PAST SURGICAL HISTORY:   The patient  has a past surgical history that includes hx  section () and hx hysterectomy (). CURRENT MEDICATIONS:   The patient has a current medication list which includes the following prescription(s): tiotropium, diphenhydramine, diclofenac, melatonin, epinephrine, lorazepam, fluticasone-salmeterol, albuterol, beclomethasone, ondansetron, guaifenesin-codeine, acetaminophen, benzocaine-menthol, prednisone, and menthol-sorbitol. ALLERGIES:     Allergies   Allergen Reactions    Pcn [Penicillins] Anaphylaxis    Strawberry Anaphylaxis    Sulfur Anaphylaxis    Adhesive Tape-Silicones Hives    Depakote [Divalproex] Other (comments)     Paralysis      Seroquel [Quetiapine] Rash    Thorazine [Chlorpromazine] Other (comments)     Not sure      Toradol [Ketorolac] Seizures    Tramadol Seizures       FAMILY HISTORY:   The patient family history includes Diabetes in her mother; Heart Attack in her mother; Thyroid Disease in her mother. SOCIAL HISTORY:   The patient  reports that she has quit smoking. She has quit using smokeless tobacco. The patient  reports that she drinks about 0.6 oz of alcohol per week  She      REVIEW OF SYSTEMS:    The patient denies fever, chills, weight loss (Constitutional), rash, itching (Skin), tinnitus, congestion (HENT), blurred vision, photophobia (Eyes), palpitations, orthopnea (Cardiovascular), hemoptysis, wheezing (Respiratory), nausea, vomiting, diarrhea (Gastrointestinal), dysuria, hematuria, urgency (Genitourinary), bowel or bladder incontinence, loss of consciousness (Neurologic), suicidal or homicidal ideation or hallucinations (Psychiatric). Denies swelling, axillary or groin masses (Lymphatic).            PHYSICAL EXAM:  VS:   Visit Vitals    /82 (BP 1 Location: Left arm, BP Patient Position: Sitting)    Pulse 96    Temp 98.6 °F (37 °C) (Oral)    Resp 22    Ht 4' 10\" (1.473 m)    Wt 62.5 kg (137 lb 12.8 oz)    BMI 28.8 kg/m2     General: Well-developed and well-nourished. Body habitus consistent with recorded height and weight and the calculated BMI. Apparent distress cervical and shoulder paraspinal, thoracolumbar paraspinal, lumbar muscle groups as noted above. .   Head: Normocephalic, atraumatic. Skin: Inspection of the skin reveals no rashes, lesions or infection. CV: Regular rate. No murmurs or rubs noted. No peripheral edema noted. Pulm: Respirations are even and unlabored. Extr: No clubbing, cyanosis, or edema noted. Musculoskeletal:  1. Cervical spine - Full ROM. No paraspinous tenderness at any level. There is no scoliosis, asymmetry, or musculoskeletal defect. 2. Cervical, thoracic, lumbar, lumbar paraspinal tenderness to palpation of muscle groups previously identified. Positive jump sign, positive muscle taut bands. Trigger points noted of these muscle groups. 3. Thoracic spine - Full ROM. No paraspinous tenderness at any level. There is no scoliosis, asymmetry, or musculoskeletal defect. 4. Lumbar spine - Full ROM. No paraspinous tenderness at any level. SI joints are nontender bilaterally. There is no scoliosis, asymmetry, or musculoskeletal defect. 5. Right upper extremity - Full ROM. 5/5 muscle strength in all muscle groups. No pain or tenderness in shoulder, elbow, wrist, or hand. 6. Left upper extremity - Full ROM. 5/5 muscle strength in all muscle groups. No pain or tenderness in shoulder, elbow, wrist, or hand. 7. Right lower extremity - Full ROM. 5/5 muscle strength in all muscle groups. No pain, tenderness, or swelling in the hip, knee, ankle or foot. 8. Left lower extremity - Full ROM. 5/5 muscle strength in all muscle groups. No pain, tenderness, or swelling in the hip, knee, ankle or foot. Neurological:  1.  Mental Status - Alert, awake and oriented. Speech is clear and appropriate. 2. Cranial Nerves - Extraocular muscles intact bilaterally. Cranial nerves II-XII grossly intact bilaterally. 3. Gait - Non-antalgic   4. Reflexes - 2+ and symmetric throughout. 5. Sensation - Intact to light touch and pin prick. 6. Provocative Tests - Spurlings negative bilaterally. Straight leg raise negative bilaterally. Psychological:  1. Mood and affect - Appropriate. 2. Speech - Appropriate. 3. Though content - Appropriate. 4. Judgment - Appropriate. ASSESSMENT:      ICD-10-CM ICD-9-CM    1. Myofascial pain syndrome M79.1 729.1    2. Cervicalgia M54.2 723.1    3. Chronic bilateral low back pain without sciatica M54.5 724.2     G89.29 338.29    4. Fibromyalgia M79.7 729.1            PLAN:    1.    I have thoroughly discussed the risks and benefits, indications, contraindications, and side effects of any/all procedures that were mentioned at today's patient visit. I have used a skeleton spine model when indicated to explain all procedures, as well as to provide added emphasis regarding procedures and as well for patient education purposes. I have answered all questions in great detail, and I have obtained verbal and written confirmation for all procedures planned with the patient. 3.    I have reviewed in great detail today, when indicated, the patient's MRI and other imaging studies with the patient. I have explained to the patient, when indicated, their condition using both actual recent and relevant images insofar as I am able to obtain these images. I have used a skeleton spine model for added emphasis as well as for patient education. 4.    I have advised patient to have a primary care provider continue to care for their health maintenance and general medical conditions. 5,    I have placed appropriate referrals to specialty care providers as I have deemed necessary through today's clinical consultation with the patient.   5. I have explained to the patient that if any significant side effects, issues, problems, concerns, or perceived complications as may arise at around the time of the patient's procedures, they should either call the pain management clinic or go to the emergency room immediately for medical provider evaluation. 6.   I have encouraged all patients to call the pain management clinic with any questions or concerns that they may have pertaining to their procedures. DISPOSITION:   The patients condition and plan were discussed at length and all questions were answered. The patient agrees with the plan. A total of 15 minutes was spent with the patient of which over half of the time was spent counseling the patient. Serjio Breaux MD 5/31/2018 5:26 PM    Note: Although these clinic notes were documented by the provider at the time of the exam, they have not been proofed and are subject to transcription variance. ADDENDUM: Trigger point procedure note as follows-     [facility] [address] [facility]    TRIGGER POINT BLOCK PROCEDURE REPORT      PATIENT:  Jaspreet Briones OF BIRTH:  1970  DATE OF SERVICE:  5/31/2018  TIME: 5:35 PM    SITE:  [address] [place] [address] for Pain Management     PHYSICIAN:  Serjio Breaux MD    PROCEDURAL TIME OUT:   16:24      Pre-procedural assessment of the patient was performed. Patient was identified by name and date of birth. Limited history and physical examination as indicated was performed. The procedure site was verified and marked as necessary. The details of the procedure were discussed with the patient, including the benefits and alternative options. All questions were answered. Informed consent was obtained. Risks and benefits were discussed, including but not limited to bleeding, infection, skin color changes, dermal fat atrophy, and transient increase in localized pain symptoms.   [ Also discussed, for thoracic procedures, the possibility of pneumothorax; if symptoms of SOB or CP occur, the patient is counseled to go to the ER or call emergency care services.]         PROCEDURE NOTE:      The patient was brought to the procedure suite and positioned in the sitting. The skin was prepped over the procedural site in the standard surgical fashion using isopropyl alcohol disinfectant times three. A 25 gauge regional block needle was passed into previously identified and marked tight muscle bands, 'trigger points,' accompanied with palpable contraction, followed by relaxation, of several of these muscle bands. After negative aspiration at each injection site, all previously identified and marked trigger points were injected with a mixture of 0.50% bupivacaine. The procedure was performed on the contralateral side in the same fashion. The area was thoroughly cleaned and sterile bandages applied as necessary. The patient tolerated the procedure well and remained stable throughout the procedure. TOTAL INJECTATE: 15ml 0.50% bupivacaine. TOTAL TRIGGER POINTS INJECTED:   RIGHT: 10                                                                     LEFT: 10  POST-PROCEDURE COURSE: The patient was observed for approximately 10 minutes after the procedure and discharged in stable condition with no apparent complication noted. DISCHARGE INSTRUCTIONS:     Follow up in clinic as needed. Follow up in the ER, or call emergency care services if any of the following occur: chest pain or pressure, SOB, N/V, numbness, weakness, dizziness, lightheadedness, or vertigo. COMPLICATIONS: None. PRE-PROCEDURE PAIN:    10/10  POST-PROCEDURE PAIN:  8/10      MUSCLES INJECTED: Bilateral trapezii, bilateral rhomboid muscle group, bilateral levator scapulae muscle group, thoracolumbar paraspinal muscle group, bilateral quadratus lumborum muscle group.     MEDICATIONS USED: 0.50% bupivacaine                                                  NDC: 84035861884                                                 LOT:  [id number]                                                 EXP: 04/20                                                 MANUF: Redd Pack MD 5/31/2018 5:35 PM

## 2018-05-31 NOTE — PROGRESS NOTES
Patient in office today for trigger point injections ; pre-injection pain score is 10/10 ; post injection instructions given; consent signed.

## 2018-06-04 ENCOUNTER — PATIENT OUTREACH (OUTPATIENT)
Dept: FAMILY MEDICINE CLINIC | Age: 48
End: 2018-06-04

## 2018-06-04 NOTE — PROGRESS NOTES
Follow UP     Per EMR review patient attended pain management appointment on 5-31-18. Per last discussion with patient on 5-30-18 she did not want to follow up with Dr. Margarito Vásquez until she has moved into her new home and has had a chance to settle in. Nurse Navigator will continue to follow case for ED follow up.

## 2018-06-11 ENCOUNTER — PATIENT OUTREACH (OUTPATIENT)
Dept: FAMILY MEDICINE CLINIC | Age: 48
End: 2018-06-11

## 2018-06-11 NOTE — PROGRESS NOTES
EMR Reviewed:     Patient received pain management Trigger Point Block Procedure 5-21-18. Patient has prevously reported she does not want to schedule a PCP appointment at this time. Nurse Navigator will continue to follow post ED Visit   5-27-18.

## 2018-06-18 ENCOUNTER — PATIENT OUTREACH (OUTPATIENT)
Dept: FAMILY MEDICINE CLINIC | Age: 48
End: 2018-06-18

## 2018-06-18 NOTE — PROGRESS NOTES
Follow Up     EMR Review     Patient has not been admitted to the ED or Hospital  Setting since ED discharge date of 5-27-18. Patient voiced previously that she does not want to follow up with PCP until she has moved and settled into her new home. Patient followed up with Pain Management on 5-31-18. Patient Navigator to follow case.

## 2018-06-28 ENCOUNTER — PATIENT OUTREACH (OUTPATIENT)
Dept: FAMILY MEDICINE CLINIC | Age: 48
End: 2018-06-28

## 2018-06-28 NOTE — PROGRESS NOTES
Patient has not been admitted to the ED or hospital setting within the last 30 days. Case management goal inactivated. No further nurse navigator follow up scheduled. Goals Addressed             Most Recent     Understands red flags post discharge. On track (6/4/2018)             Plan: Review Red Flags with patient:   Target Date:  5-1-18    Red Flags:   -Shortness of breath  Chest pain     Signs of Infection:    - Fever/ Chills  - Increased Heart Rate  - Increased rate of breathing  - decreased urination  - confusion  - a new rash   Follow up with PCP/ED     - Nausea/Vomiting  - falls  - bleeding  Follow up with PCP or ED     6-28-18:    Goal deleted. Nurse Navigator unable to complete GALEN with patient. Nurse Navigator attempted to contact patient via telephone call. There was no answer. Nurse Navigator left a voicemail message for patient. Patient asked to contact PCP or ED for any medical concerns. Nurse Navigator contact information provided for other needs or concerns. Pt has nurse navigator's contact information for any further questions, concerns, or needs. Patients upcoming visits:  No future appointments. Transition of Care Episode Closed.

## 2018-08-06 ENCOUNTER — TELEPHONE (OUTPATIENT)
Dept: PAIN MANAGEMENT | Age: 48
End: 2018-08-06

## 2019-03-16 ENCOUNTER — HOSPITAL ENCOUNTER (EMERGENCY)
Age: 49
Discharge: HOME | End: 2019-03-17
Attending: EMERGENCY MEDICINE
Payer: MEDICARE

## 2019-03-16 DIAGNOSIS — R11.2 NAUSEA AND VOMITING, INTRACTABILITY OF VOMITING NOT SPECIFIED, UNSPECIFIED VOMITING TYPE: Primary | ICD-10-CM

## 2019-03-16 LAB
ALBUMIN SERPL-MCNC: 4.1 G/DL (ref 3.4–5)
ALBUMIN/GLOB SERPL: 1.1 {RATIO} (ref 0.8–1.7)
ALP SERPL-CCNC: 133 U/L (ref 45–117)
ALT SERPL-CCNC: 41 U/L (ref 13–56)
ANION GAP SERPL CALC-SCNC: 6 MMOL/L (ref 3–18)
APPEARANCE UR: ABNORMAL
AST SERPL-CCNC: 23 U/L (ref 15–37)
BACTERIA URNS QL MICRO: ABNORMAL /HPF
BASOPHILS # BLD: 0 K/UL (ref 0–0.1)
BASOPHILS NFR BLD: 0 % (ref 0–2)
BILIRUB SERPL-MCNC: 0.7 MG/DL (ref 0.2–1)
BILIRUB UR QL: NEGATIVE
BUN SERPL-MCNC: 9 MG/DL (ref 7–18)
BUN/CREAT SERPL: 12 (ref 12–20)
CALCIUM SERPL-MCNC: 8.9 MG/DL (ref 8.5–10.1)
CHLORIDE SERPL-SCNC: 105 MMOL/L (ref 100–108)
CO2 SERPL-SCNC: 29 MMOL/L (ref 21–32)
COLOR UR: YELLOW
CREAT SERPL-MCNC: 0.76 MG/DL (ref 0.6–1.3)
DIFFERENTIAL METHOD BLD: ABNORMAL
EOSINOPHIL # BLD: 0.3 K/UL (ref 0–0.4)
EOSINOPHIL NFR BLD: 3 % (ref 0–5)
EPITH CASTS URNS QL MICRO: ABNORMAL /LPF (ref 0–5)
ERYTHROCYTE [DISTWIDTH] IN BLOOD BY AUTOMATED COUNT: 13.9 % (ref 11.6–14.5)
GLOBULIN SER CALC-MCNC: 3.8 G/DL (ref 2–4)
GLUCOSE SERPL-MCNC: 113 MG/DL (ref 74–99)
GLUCOSE UR STRIP.AUTO-MCNC: NEGATIVE MG/DL
HCT VFR BLD AUTO: 42.5 % (ref 35–45)
HGB BLD-MCNC: 14.6 G/DL (ref 12–16)
HGB UR QL STRIP: ABNORMAL
KETONES UR QL STRIP.AUTO: NEGATIVE MG/DL
LEUKOCYTE ESTERASE UR QL STRIP.AUTO: NEGATIVE
LIPASE SERPL-CCNC: 159 U/L (ref 73–393)
LYMPHOCYTES # BLD: 0.7 K/UL (ref 0.9–3.6)
LYMPHOCYTES NFR BLD: 8 % (ref 21–52)
MCH RBC QN AUTO: 27.9 PG (ref 24–34)
MCHC RBC AUTO-ENTMCNC: 34.4 G/DL (ref 31–37)
MCV RBC AUTO: 81.3 FL (ref 74–97)
MONOCYTES # BLD: 0.4 K/UL (ref 0.05–1.2)
MONOCYTES NFR BLD: 5 % (ref 3–10)
MUCOUS THREADS URNS QL MICRO: ABNORMAL /LPF
NEUTS SEG # BLD: 7.7 K/UL (ref 1.8–8)
NEUTS SEG NFR BLD: 84 % (ref 40–73)
NITRITE UR QL STRIP.AUTO: NEGATIVE
PH UR STRIP: 6 [PH] (ref 5–8)
PLATELET # BLD AUTO: 251 K/UL (ref 135–420)
PMV BLD AUTO: 9.9 FL (ref 9.2–11.8)
POTASSIUM SERPL-SCNC: 3.8 MMOL/L (ref 3.5–5.5)
PROT SERPL-MCNC: 7.9 G/DL (ref 6.4–8.2)
PROT UR STRIP-MCNC: NEGATIVE MG/DL
RBC # BLD AUTO: 5.23 M/UL (ref 4.2–5.3)
RBC #/AREA URNS HPF: ABNORMAL /HPF (ref 0–5)
SODIUM SERPL-SCNC: 140 MMOL/L (ref 136–145)
SP GR UR REFRACTOMETRY: 1.02 (ref 1–1.03)
UROBILINOGEN UR QL STRIP.AUTO: 0.2 EU/DL (ref 0.2–1)
WBC # BLD AUTO: 9.2 K/UL (ref 4.6–13.2)
WBC URNS QL MICRO: ABNORMAL /HPF (ref 0–4)

## 2019-03-16 PROCEDURE — 74011250636 HC RX REV CODE- 250/636: Performed by: PHYSICIAN ASSISTANT

## 2019-03-16 PROCEDURE — 83690 ASSAY OF LIPASE: CPT

## 2019-03-16 PROCEDURE — 99284 EMERGENCY DEPT VISIT MOD MDM: CPT

## 2019-03-16 PROCEDURE — 80053 COMPREHEN METABOLIC PANEL: CPT

## 2019-03-16 PROCEDURE — 81001 URINALYSIS AUTO W/SCOPE: CPT

## 2019-03-16 PROCEDURE — 96374 THER/PROPH/DIAG INJ IV PUSH: CPT

## 2019-03-16 PROCEDURE — 96375 TX/PRO/DX INJ NEW DRUG ADDON: CPT

## 2019-03-16 PROCEDURE — 96361 HYDRATE IV INFUSION ADD-ON: CPT

## 2019-03-16 PROCEDURE — 85025 COMPLETE CBC W/AUTO DIFF WBC: CPT

## 2019-03-16 PROCEDURE — 74011250636 HC RX REV CODE- 250/636: Performed by: EMERGENCY MEDICINE

## 2019-03-16 RX ORDER — ONDANSETRON 2 MG/ML
4 INJECTION INTRAMUSCULAR; INTRAVENOUS
Status: DISCONTINUED | OUTPATIENT
Start: 2019-03-16 | End: 2019-03-17 | Stop reason: HOSPADM

## 2019-03-16 RX ORDER — METOCLOPRAMIDE HYDROCHLORIDE 5 MG/ML
5 INJECTION INTRAMUSCULAR; INTRAVENOUS ONCE
Status: COMPLETED | OUTPATIENT
Start: 2019-03-16 | End: 2019-03-16

## 2019-03-16 RX ORDER — DIPHENHYDRAMINE HYDROCHLORIDE 50 MG/ML
12.5 INJECTION, SOLUTION INTRAMUSCULAR; INTRAVENOUS ONCE
Status: COMPLETED | OUTPATIENT
Start: 2019-03-16 | End: 2019-03-16

## 2019-03-16 RX ADMIN — DIPHENHYDRAMINE HYDROCHLORIDE 12.5 MG: 50 INJECTION INTRAMUSCULAR; INTRAVENOUS at 21:00

## 2019-03-16 RX ADMIN — METOCLOPRAMIDE 5 MG: 5 INJECTION, SOLUTION INTRAMUSCULAR; INTRAVENOUS at 21:01

## 2019-03-16 RX ADMIN — SODIUM CHLORIDE 1000 ML: 900 INJECTION, SOLUTION INTRAVENOUS at 20:33

## 2019-03-16 NOTE — ED PROVIDER NOTES
EMERGENCY DEPARTMENT HISTORY AND PHYSICAL EXAM    7:42 PM      Date: 3/16/2019  Patient Name: Isaac Green    History of Presenting Illness     Chief Complaint   Patient presents with    Vomiting    Diarrhea         History Provided By: Patient      Additional History (Context): Isaac Green is a 52 y.o. female with PMHx of asthma, fibromyalgia, and diverticulosis who presents with diarrhea that started 4 hours ago with associated nausea, vomiting, and diffuse abdominal pain that started at the same time. The pt reports that she has \"multiple\" episodes of diarrhea since onset. Her stool has been \"yellow fluid\" but not having any blood. She has also had multiple episodes of vomiting. She took some Zofran but states that she just vomited afterwards. She describes her abdominal pain as \"extending from the pelvis and up to the esophagus\". The pt also reports to be feeling very anxious but admits to a history of anxiety. She denies any sick contact or any recently travel. No know exposure to poorly prepared or contaminated food or drink. The patient presents no further medical complaints or concerns to the ED at this time.     PCP: Ev Silva MD    Chief Complaint: Diarrhea  Duration:  Hours  Timing:  Acute and Worsening  Location: Pain of abdomen  Quality: Dull  Severity: Moderate  Modifying Factors: Vomiting not improved with Zofran  Associated Symptoms: nausea, vomiting, abdominal pain    Current Facility-Administered Medications   Medication Dose Route Frequency Provider Last Rate Last Dose    ondansetron (ZOFRAN) injection 4 mg  4 mg IntraVENous NOW Orin Luna, [place]   Stopped at 03/16/19 1909    sodium chloride 0.9 % bolus infusion 1,000 mL  1,000 mL IntraVENous ONCE Raquel Swartz DO         Current Outpatient Medications   Medication Sig Dispense Refill    metoclopramide HCl (REGLAN) 10 mg tablet Take 1 Tab by mouth three (3) times daily as needed for Nausea for up to 10 days. 12 Tab 0    nicotine (NICODERM CQ) 14 mg/24 hr patch 1 Patch by TransDERmal route every twenty-four (24) hours.  omalizumab (XOLAIR) 150 mg solr 375 mg by SubCUTAneous route every fourteen (14) days.  dupilumab (DUPIXENT) 300 mg/2 mL syrg syringe 300 mg by SubCUTAneous route every fourteen (14) days.  tiotropium (SPIRIVA WITH HANDIHALER) 18 mcg inhalation capsule Take 2 Caps by inhalation daily.  diphenhydrAMINE (BENADRYL) 25 mg capsule Take 25 mg by mouth every six (6) hours as needed.  diclofenac (VOLTAREN) 1 % gel Apply  to affected area four (4) times daily.  melatonin 10 mg tab Take  by mouth.  EPINEPHrine (EPIPEN) 0.3 mg/0.3 mL injection 0.3 mg by IntraMUSCular route once as needed.  LORazepam (ATIVAN) 1 mg tablet Take 1 Tab by mouth two (2) times a day. Max Daily Amount: 2 mg. 60 Tab 0    fluticasone-salmeterol (ADVAIR HFA) 230-21 mcg/actuation inhaler Take 2 Puffs by inhalation two (2) times a day.  albuterol (PROVENTIL HFA, VENTOLIN HFA, PROAIR HFA) 90 mcg/actuation inhaler Take 1 Puff by inhalation.  beclomethasone (QVAR) 80 mcg/actuation aero Take 1 Puff by inhalation two (2) times a day.          Past History     Past Medical History:  Past Medical History:   Diagnosis Date    Anxiety     Asthma     Bipolar 1 disorder ([facility] [address]     Cancer (HCC)     bladder CA, cervical CA, skin CA    Fibromyalgia     Hiatal hernia     PE (pulmonary thromboembolism) (HCC)     Pneumonia     TIA (transient ischemic attack)        Past Surgical History:  Past Surgical History:   Procedure Laterality Date    HX  SECTION      HX HYSTERECTOMY      HX OTHER SURGICAL      skin CA lesions removed L calf, L thigh       Family History:  Family History   Problem Relation Age of Onset    Thyroid Disease Mother     Diabetes Mother     Heart Attack Mother        Social History:  Social History     Tobacco Use    Smoking status: Never Smoker    Smokeless tobacco: Never Used   Substance Use Topics    Alcohol use: No    Drug use: No       Allergies: Allergies   Allergen Reactions    Pcn [Penicillins] Anaphylaxis    Strawberry Anaphylaxis    Sulfur Anaphylaxis    Adhesive Tape-Silicones Hives    Depakote [Divalproex] Other (comments)     Paralysis      Levaquin [Levofloxacin] Nausea and Vomiting    Seroquel [Quetiapine] Rash    Thorazine [Chlorpromazine] Other (comments)     Not sure      Toradol [Ketorolac] Seizures    Tramadol Seizures         Review of Systems     Review of Systems   Constitutional: Negative for activity change, fatigue and fever. HENT: Negative for congestion and rhinorrhea. Eyes: Negative for visual disturbance. Respiratory: Negative for shortness of breath. Cardiovascular: Negative for chest pain and palpitations. Gastrointestinal: Positive for abdominal pain, diarrhea, nausea and vomiting. Genitourinary: Negative for dysuria and hematuria. Musculoskeletal: Negative for back pain. Skin: Negative for rash. Neurological: Negative for dizziness, weakness and light-headedness. All other systems reviewed and are negative. Physical Exam     Visit Vitals  /72   Pulse (!) 109   Temp 99 °F (37.2 °C)   Resp 21   Ht 4' 10\" (1.473 m)   Wt 59 kg (130 lb)   SpO2 94%   BMI 27.17 kg/m²       Physical Exam   Constitutional: She is oriented to person, place, and time. She appears well-developed and well-nourished. No distress. HENT:   Head: Normocephalic and atraumatic. Right Ear: External ear normal.   Left Ear: External ear normal.   Nose: Nose normal.   Mouth/Throat: Oropharynx is clear and moist. Mucous membranes are dry. Eyes: Conjunctivae and EOM are normal. Pupils are equal, round, and reactive to light. No scleral icterus. Neck: Normal range of motion. Neck supple. No tracheal deviation present. Cardiovascular: Regular rhythm and intact distal pulses. Tachycardia present. Pulmonary/Chest: Effort normal and breath sounds normal. She exhibits no tenderness. Abdominal: Soft. Bowel sounds are normal. She exhibits no distension. There is generalized tenderness. There is no rebound and no guarding. Musculoskeletal: Normal range of motion. She exhibits no tenderness. Neurological: She is alert and oriented to person, place, and time. No cranial nerve deficit. Coordination normal.   Skin: Skin is warm and dry. Psychiatric: She has a normal mood and affect. Her behavior is normal. Judgment and thought content normal.   Nursing note and vitals reviewed. Diagnostic Study Results     Labs -  Recent Results (from the past 12 hour(s))   CBC WITH AUTOMATED DIFF    Collection Time: 03/16/19  7:21 PM   Result Value Ref Range    WBC 9.2 4.6 - 13.2 K/uL    RBC 5.23 4.20 - 5.30 M/uL    HGB 14.6 12.0 - 16.0 g/dL    HCT 42.5 35.0 - 45.0 %    MCV 81.3 74.0 - 97.0 FL    MCH 27.9 24.0 - 34.0 PG    MCHC 34.4 31.0 - 37.0 g/dL    RDW 13.9 11.6 - 14.5 %    PLATELET 247 652 - 560 K/uL    MPV 9.9 9.2 - 11.8 FL    NEUTROPHILS 84 (H) 40 - 73 %    LYMPHOCYTES 8 (L) 21 - 52 %    MONOCYTES 5 3 - 10 %    EOSINOPHILS 3 0 - 5 %    BASOPHILS 0 0 - 2 %    ABS. NEUTROPHILS 7.7 1.8 - 8.0 K/UL    ABS. LYMPHOCYTES 0.7 (L) 0.9 - 3.6 K/UL    ABS. MONOCYTES 0.4 0.05 - 1.2 K/UL    ABS. EOSINOPHILS 0.3 0.0 - 0.4 K/UL    ABS.  BASOPHILS 0.0 0.0 - 0.1 K/UL    DF AUTOMATED     METABOLIC PANEL, COMPREHENSIVE    Collection Time: 03/16/19  7:21 PM   Result Value Ref Range    Sodium 140 136 - 145 mmol/L    Potassium 3.8 3.5 - 5.5 mmol/L    Chloride 105 100 - 108 mmol/L    CO2 29 21 - 32 mmol/L    Anion gap 6 3.0 - 18 mmol/L    Glucose 113 (H) 74 - 99 mg/dL    BUN 9 7.0 - 18 MG/DL    Creatinine 0.76 0.6 - 1.3 MG/DL    BUN/Creatinine ratio 12 12 - 20      GFR est AA >60 >60 ml/min/1.73m2    GFR est non-AA >60 >60 ml/min/1.73m2    Calcium 8.9 8.5 - 10.1 MG/DL    Bilirubin, total 0.7 0.2 - 1.0 MG/DL    ALT (SGPT) 41 13 - 56 U/L AST (SGOT) 23 15 - 37 U/L    Alk. phosphatase 133 (H) 45 - 117 U/L    Protein, total 7.9 6.4 - 8.2 g/dL    Albumin 4.1 3.4 - 5.0 g/dL    Globulin 3.8 2.0 - 4.0 g/dL    A-G Ratio 1.1 0.8 - 1.7     LIPASE    Collection Time: 03/16/19  7:21 PM   Result Value Ref Range    Lipase 159 73 - 393 U/L   URINALYSIS W/ RFLX MICROSCOPIC    Collection Time: 03/16/19  7:36 PM   Result Value Ref Range    Color YELLOW      Appearance CLOUDY      Specific gravity 1.021 1.005 - 1.030      pH (UA) 6.0 5.0 - 8.0      Protein NEGATIVE  NEG mg/dL    Glucose NEGATIVE  NEG mg/dL    Ketone NEGATIVE  NEG mg/dL    Bilirubin NEGATIVE  NEG      Blood TRACE (A) NEG      Urobilinogen 0.2 0.2 - 1.0 EU/dL    Nitrites NEGATIVE  NEG      Leukocyte Esterase NEGATIVE  NEG     URINE MICROSCOPIC ONLY    Collection Time: 03/16/19  7:36 PM   Result Value Ref Range    WBC NONE 0 - 4 /hpf    RBC 1 to 2 0 - 5 /hpf    Epithelial cells 1+ 0 - 5 /lpf    Bacteria FEW (A) NEG /hpf    Mucus 1+ (A) NEG /lpf       Radiologic Studies -   No orders to display         Medical Decision Making     It should be noted that I, Arely Koo DO will be the provider of record for this patient. I reviewed the vital signs, available nursing notes, past medical history, past surgical history, family history and social history. Vital Signs-Reviewed the patient's vital signs. Pulse Oximetry Analysis -  95% on room air (Interpretation), wnl    Cardiac Monitor:  Rate: 112 bpm    Records Reviewed: Nursing Notes (Time of Review: 7:42 PM)    ED Course: Progress Notes, Reevaluation, and Consults:  9:32 PM  Reevaluated pt. She is feeling better and is resting comfortably. I repositioned her to increase her IV fluid flow rate. Provider Notes (Medical Decision Making):   Patient is a 51-year-old female who comes in complaining of abdominal pain and vomiting. Patient has received fluids and nausea medication is no longer having any vomiting or abdominal pain.   Her symptoms have completely resolved and she is taking p.o. She would like to go home. No acute findings on blood work or urine. Stable for discharge. Follow-up with PMD.  Return if any worsening or concerning symptoms. Diagnosis     Clinical Impression:   1. Nausea and vomiting, intractability of vomiting not specified, unspecified vomiting type        Disposition: Discharge home    Follow-up Information     Follow up With Specialties Details Why Contact Info    Meng Ewing MD [facility] [place] [facility] Call in 1 day  [address] [facility] [address] [facility]  [phone]                Medication List      START taking these medications    metoclopramide HCl 10 mg tablet  Commonly known as:  REGLAN  Take 1 Tab by mouth three (3) times daily as needed for Nausea for up to 10 days. ASK your doctor about these medications    albuterol 90 mcg/actuation inhaler  Commonly known as:  PROVENTIL HFA, VENTOLIN HFA, PROAIR HFA     BENADRYL 25 mg capsule  Generic drug:  diphenhydrAMINE     dupilumab 300 mg/2 mL Syrg syringe  Commonly known as:  DUPIXENT     EPIPEN 0.3 mg/0.3 mL injection  Generic drug:  EPINEPHrine     fluticasone propion-salmeterol 230-21 mcg/actuation inhaler  Commonly known as:  ADVAIR HFA     LORazepam 1 mg tablet  Commonly known as:  ATIVAN  Take 1 Tab by mouth two (2) times a day.  Max Daily Amount: 2 mg.     melatonin 10 mg Tab     NICODERM CQ 14 mg/24 hr patch  Generic drug:  nicotine     QVAR 80 mcg/actuation Aero  Generic drug:  beclomethasone     SPIRIVA WITH HANDIHALER 18 mcg inhalation capsule  Generic drug:  tiotropium     VOLTAREN 1 % Gel  Generic drug:  diclofenac     XOLAIR 150 mg Solr  Generic drug:  omalizumab           Where to Get Your Medications      These medications were sent to [facility], [address], [address]    Phone:  [phone]   · metoclopramide HCl 10 mg tablet _______________________________       Scribe uGi Bermeo acting as a scribe for and in the presence of María DO Caleb      March 16, 2019 at 7:42 PM       Provider Attestation:      I personally performed the services described in the documentation, reviewed the documentation, as recorded by the scribe in my presence, and it accurately and completely records my words and actions.  March 16, 2019 at 7:42 PM - María WHEELER DO        _______________________________

## 2019-03-16 NOTE — ED TRIAGE NOTES
The patient presents for evaluation of vomiting and diarrhea that began approximately four hours ago.

## 2019-03-16 NOTE — ED NOTES
Bedside report handed off to [facility]. All essential information handed off. Pt stable at this time.

## 2019-03-17 VITALS
SYSTOLIC BLOOD PRESSURE: 119 MMHG | WEIGHT: 130 LBS | DIASTOLIC BLOOD PRESSURE: 62 MMHG | TEMPERATURE: 99 F | HEIGHT: 58 IN | BODY MASS INDEX: 27.29 KG/M2 | RESPIRATION RATE: 21 BRPM | OXYGEN SATURATION: 94 % | HEART RATE: 101 BPM

## 2019-03-17 RX ORDER — METOCLOPRAMIDE 10 MG/1
10 TABLET ORAL
Qty: 12 TAB | Refills: 0 | Status: SHIPPED | OUTPATIENT
Start: 2019-03-17 | End: 2019-03-27

## 2019-03-17 NOTE — DISCHARGE INSTRUCTIONS
Patient Education        Nausea and Vomiting: Care Instructions  Your Care Instructions    When you are nauseated, you may feel weak and sweaty and notice a lot of saliva in your mouth. Nausea often leads to vomiting. Most of the time you do not need to worry about nausea and vomiting, but they can be signs of other illnesses. Two common causes of nausea and vomiting are stomach flu and food poisoning. Nausea and vomiting from viral stomach flu will usually start to improve within 24 hours. Nausea and vomiting from food poisoning may last from 12 to 48 hours. The doctor has checked you carefully, but problems can develop later. If you notice any problems or new symptoms, get medical treatment right away. Follow-up care is a key part of your treatment and safety. Be sure to make and go to all appointments, and call your doctor if you are having problems. It's also a good idea to know your test results and keep a list of the medicines you take. How can you care for yourself at home? · To prevent dehydration, drink plenty of fluids, enough so that your urine is light yellow or clear like water. Choose water and other caffeine-free clear liquids until you feel better. If you have kidney, heart, or liver disease and have to limit fluids, talk with your doctor before you increase the amount of fluids you drink. · Rest in bed until you feel better. · When you are able to eat, try clear soups, mild foods, and liquids until all symptoms are gone for 12 to 48 hours. Other good choices include dry toast, crackers, cooked cereal, and gelatin dessert, such as Jell-O. When should you call for help? Call 911 anytime you think you may need emergency care. For example, call if:    · You passed out (lost consciousness).    Call your doctor now or seek immediate medical care if:    · You have symptoms of dehydration, such as:  ? Dry eyes and a dry mouth. ? Passing only a little dark urine. ?  Feeling thirstier than usual.   · You have new or worsening belly pain.     · You have a new or higher fever.     · You vomit blood or what looks like coffee grounds.    Watch closely for changes in your health, and be sure to contact your doctor if:    · You have ongoing nausea and vomiting.     · Your vomiting is getting worse.     · Your vomiting lasts longer than 2 days.     · You are not getting better as expected. Where can you learn more? Go to http://polo-jay jay.info/. Enter 25 [id number] in the search box to learn more about \"Nausea and Vomiting: Care Instructions. \"  Current as of: September 23, 2018  Content Version: 11.9  © 3202-2997 [employer], [employer]. Care instructions adapted under license by [employer] (which disclaims liability or warranty for this information). If you have questions about a medical condition or this instruction, always ask your healthcare professional. [address] any warranty or liability for your use of this information.

## 2019-04-24 NOTE — PROGRESS NOTES
Chief Complaint   Patient presents with   [facility] Follow Up     Pneumonia     1. Have you been to the ER, urgent care clinic since your last visit? Hospitalized since your last visit? Yes, at [facility] for pneumonia    2. Have you seen or consulted any other health care providers outside of the [facility] since your last visit? Include any pap smears or colon screening. No     HPI  Afshan Jaime comes in for follow-up care. Pneumonia: Patient recently admitted for pneumonia. She was treated with IV medication and later on discharged home on Levaquin. She does still have 1 more dose left for tomorrow on the Levaquin. No fever or chills. She denies any chest pain or discomfort. Occasional cough but no hemoptysis. Overall this has improved. She is still using her inhalers and nebulizer treatments. She does have another appointment in 2 weeks and we will do a recheck chest x-ray. Mood disorder: Patient is tearful today and crying. She is depressed and stressed acutely. She lives with her 66-year-old roommate. She has been there for the past 5 months. The roommate wants to kick her out. Roommates demands she pays her rent money. Patient does not have any money at the moment. Roommate is also insensitive to patient's feelings and that has brought in a boyfriend to leave in the house which patient finds it appropriate. She at times tried to stay in her room and not see much but this morning her roommate confronted her about rent money. This has caused the patient a lot of stress and anguish. She wonders where she will go. She does in fact states that the she might end up being homeless. Her daughter is moving house thus unable to take her in. She does have her son and have mom who live in [place]. She wonders about borrowing money from her mom but states that the this might not be possible. Overall she does have a lot going on. I did try to encourage her.   She has tried to look [FreeTextEntry1] : 27 year old male presents for Left microsurgical varicocele ligation.  Exam and ultrasound confirm a left varicocele. SA demonstrates an increase in tapered heads. Blood work was normal. Discussed varicocele ligation. Discussed surgical technique, recovery time, and need for repeat semen analysis in the future. \par \par I discussed with the patient the procedure of a left microsurgical varicocele ligation. He understands the relative risks and benefits including the possibility of semen quality might not improve after the procedure. He viewed the slides on the procedure and asked questions which were answered to their satisfaction. He also understand alternatives including intrauterine insemination and IVF. \par \par  \par  for other options in terms of housing but the problem is the finances. I did let her know that she could also contact [address] [place] [address] Road services to see what type of options that would have someone in her situation. Past Medical History  Past Medical History:   Diagnosis Date    Asthma     Bipolar 1 disorder ([address]     Hiatal hernia        Surgical History  Past Surgical History:   Procedure Laterality Date    HX  SECTION      HX HYSTERECTOMY          Medications  Current Outpatient Prescriptions   Medication Sig Dispense Refill    guaiFENesin-codeine (ROBITUSSIN AC) 100-10 mg/5 mL solution Take 5 mL by mouth every four (4) hours as needed for Cough. Max Daily Amount: 30 mL. 118 mL 0    acetaminophen (TYLENOL) 325 mg tablet Take 2 Tabs by mouth every four (4) hours as needed. 30 Tab 0    levoFLOXacin (LEVAQUIN) 750 mg tablet Take 1 Tab by mouth daily for 4 days. 4 Tab 0    Benzocaine-Menthol (CHLORASEPTIC SORE THROAT) 6-10 mg lozg 1 Lozenge by Mucous Membrane route as needed. 18 Lozenge 0    predniSONE (DELTASONE) 10 mg tablet Prednisone 10mg tabs: p.o.  4 tabs daily for 2 days then drop to   3 tabs daily for 3 days then drop to   2 tabs daily for 3 days then drop to   1 tab daily for 3 days then stop. Dispense 29 tabs 29 Tab 0    menthol-sorbitol (THROAT LOZENGES) lozenge Take 1 Lozenge by mouth as needed for Pain. 10 Lozenge 0    ondansetron (ZOFRAN ODT) 4 mg disintegrating tablet Take 1 Tab by mouth every eight (8) hours as needed for Nausea. 10 Tab 0    diphenhydrAMINE (BENADRYL) 25 mg capsule Take 25 mg by mouth every six (6) hours as needed.  diclofenac (VOLTAREN) 1 % gel Apply  to affected area four (4) times daily.  melatonin 10 mg tab Take  by mouth.  EPINEPHrine (EPIPEN) 0.3 mg/0.3 mL injection 0.3 mg by IntraMUSCular route once as needed.  [START ON 2018] LORazepam (ATIVAN) 1 mg tablet Take 1 Tab by mouth two (2) times a day.  Max Daily Amount: 2 mg. 60 Tab 0    fluticasone-salmeterol (ADVAIR HFA) 230-21 mcg/actuation inhaler Take 2 Puffs by inhalation two (2) times a day.  albuterol (PROVENTIL HFA, VENTOLIN HFA, PROAIR HFA) 90 mcg/actuation inhaler Take 1 Puff by inhalation.  beclomethasone (QVAR) 80 mcg/actuation aero Take 1 Puff by inhalation two (2) times a day. Allergies  Allergies   Allergen Reactions    Pcn [Penicillins] Anaphylaxis    Strawberry Anaphylaxis    Sulfur Anaphylaxis    Adhesive Tape-Silicones Hives    Depakote [Divalproex] Other (comments)     Paralysis      Thorazine [Chlorpromazine] Other (comments)     Not sure      Toradol [Ketorolac] Seizures    Tramadol Seizures       Family History  Family History   Problem Relation Age of Onset    Thyroid Disease Mother     Diabetes Mother     Heart Attack Mother        Social History  Social History     Social History    Marital status:      Spouse name: N/A    Number of children: N/A    Years of education: N/A     Occupational History    Not on file.      Social History Main Topics    Smoking status: Former Smoker    Smokeless tobacco: Former User    Alcohol use 0.6 oz/week     1 Shots of liquor per week    Drug use: Not on file    Sexual activity: Not on file     Other Topics Concern    Not on file     Social History Narrative       Review of Systems  Review of Systems - Negative except as noted above in the HPI    Vital Signs  Visit Vitals    /67 (BP 1 Location: Left arm, BP Patient Position: Sitting)    Pulse 83    Temp 97.3 °F (36.3 °C) (Oral)    Resp 18    Ht 4' 10\" (1.473 m)    Wt 137 lb (62.1 kg)    SpO2 98%    BMI 28.63 kg/m2         Physical Exam  Physical Examination: General appearance - acyanotic, in no respiratory distress and crying  Mental status - depressed mood  Nose - mucosal congestion  Mouth - mucous membranes moist, pharynx normal without lesions  Chest - no tachypnea, retractions or cyanosis, wheezing noted bilateral lung zones on and off  Heart - normal rate and regular rhythm, S1 and S2 normal  Back exam - limited range of motion  Neurological - motor and sensory grossly normal bilaterally  Extremities - peripheral pulses normal, no pedal edema, no clubbing or cyanosis    Results  Results for orders placed or performed during the hospital encounter of 04/27/18   CULTURE, BLOOD   Result Value Ref Range    Special Requests: NO SPECIAL REQUESTS      Culture result: NO GROWTH 6 DAYS     CULTURE, BLOOD   Result Value Ref Range    Special Requests: NO SPECIAL REQUESTS      Culture result: NO GROWTH 6 DAYS     CULTURE, RESPIRATORY/SPUTUM/BRONCH W GRAM STAIN   Result Value Ref Range    Special Requests: NO SPECIAL REQUESTS      GRAM STAIN 10-25 WBC/lpf      GRAM STAIN 10-25 EPI/lpf      GRAM STAIN MUCUS PRESENT      GRAM STAIN FEW GRAM POSITIVE COCCI IN PAIRS      GRAM STAIN FEW GRAM NEGATIVE RODS      Culture result: MANY NORMAL RESPIRATORY DENT[place]     LEGIONELLA PNEUMOPHILA AG, URINE   Result Value Ref Range    Legionella Ag, urine NEGATIVE  NEG     CBC WITH AUTOMATED DIFF   Result Value Ref Range    WBC 5.4 4.6 - 13.2 K/uL    RBC 4.82 4.20 - 5.30 M/uL    HGB 12.7 12.0 - 16.0 g/dL    HCT 39.2 35.0 - 45.0 %    MCV 81.3 74.0 - 97.0 FL    MCH 26.3 24.0 - 34.0 PG    MCHC 32.4 31.0 - 37.0 g/dL    RDW 15.3 (H) 11.6 - 14.5 %    PLATELET 264 321 - 089 K/uL    MPV 10.4 9.2 - 11.8 FL    NEUTROPHILS 62 40 - 73 %    LYMPHOCYTES 25 21 - 52 %    MONOCYTES 11 (H) 3 - 10 %    EOSINOPHILS 2 0 - 5 %    BASOPHILS 0 0 - 2 %    ABS. NEUTROPHILS 3.3 1.8 - 8.0 K/UL    ABS. LYMPHOCYTES 1.3 0.9 - 3.6 K/UL    ABS. MONOCYTES 0.6 0.05 - 1.2 K/UL    ABS. EOSINOPHILS 0.1 0.0 - 0.4 K/UL    ABS.  BASOPHILS 0.0 0.0 - 0.06 K/UL    DF AUTOMATED     METABOLIC PANEL, BASIC   Result Value Ref Range    Sodium 142 136 - 145 mmol/L    Potassium 3.4 (L) 3.5 - 5.5 mmol/L    Chloride 108 100 - 108 mmol/L    CO2 29 21 - 32 mmol/L    Anion gap 5 3.0 - 18 mmol/L Glucose 92 74 - 99 mg/dL    BUN 6 (L) 7.0 - 18 MG/DL    Creatinine 0.73 0.6 - 1.3 MG/DL    BUN/Creatinine ratio 8 (L) 12 - 20      GFR est AA >60 >60 ml/min/1.73m2    GFR est non-AA >60 >60 ml/min/1.73m2    Calcium 8.3 (L) 8.5 - 10.1 MG/DL   D DIMER   Result Value Ref Range    D DIMER 0.67 (H) <0.46 ug/ml(FEU)   METABOLIC PANEL, BASIC   Result Value Ref Range    Sodium 142 136 - 145 mmol/L    Potassium 4.1 3.5 - 5.5 mmol/L    Chloride 107 100 - 108 mmol/L    CO2 28 21 - 32 mmol/L    Anion gap 7 3.0 - 18 mmol/L    Glucose 142 (H) 74 - 99 mg/dL    BUN 8 7.0 - 18 MG/DL    Creatinine 0.80 0.6 - 1.3 MG/DL    BUN/Creatinine ratio 10 (L) 12 - 20      GFR est AA >60 >60 ml/min/1.73m2    GFR est non-AA >60 >60 ml/min/1.73m2    Calcium 8.5 8.5 - 10.1 MG/DL   CBC WITH AUTOMATED DIFF   Result Value Ref Range    WBC 4.0 (L) 4.6 - 13.2 K/uL    RBC 4.54 4.20 - 5.30 M/uL    HGB 11.8 (L) 12.0 - 16.0 g/dL    HCT 37.2 35.0 - 45.0 %    MCV 81.9 74.0 - 97.0 FL    MCH 26.0 24.0 - 34.0 PG    MCHC 31.7 31.0 - 37.0 g/dL    RDW 15.1 (H) 11.6 - 14.5 %    PLATELET 162 483 - 553 K/uL    MPV 10.2 9.2 - 11.8 FL    NEUTROPHILS 75 (H) 40 - 73 %    LYMPHOCYTES 20 (L) 21 - 52 %    MONOCYTES 5 3 - 10 %    EOSINOPHILS 0 0 - 5 %    BASOPHILS 0 0 - 2 %    ABS. NEUTROPHILS 3.0 1.8 - 8.0 K/UL    ABS. LYMPHOCYTES 0.8 (L) 0.9 - 3.6 K/UL    ABS. MONOCYTES 0.2 0.05 - 1.2 K/UL    ABS. EOSINOPHILS 0.0 0.0 - 0.4 K/UL    ABS.  BASOPHILS 0.0 0.0 - 0.1 K/UL    DF AUTOMATED     METABOLIC PANEL, BASIC   Result Value Ref Range    Sodium 143 136 - 145 mmol/L    Potassium 4.1 3.5 - 5.5 mmol/L    Chloride 108 100 - 108 mmol/L    CO2 29 21 - 32 mmol/L    Anion gap 6 3.0 - 18 mmol/L    Glucose 125 (H) 74 - 99 mg/dL    BUN 15 7.0 - 18 MG/DL    Creatinine 0.82 0.6 - 1.3 MG/DL    BUN/Creatinine ratio 18 12 - 20      GFR est AA >60 >60 ml/min/1.73m2    GFR est non-AA >60 >60 ml/min/1.73m2    Calcium 9.6 8.5 - 10.1 MG/DL   CBC WITH AUTOMATED DIFF   Result Value Ref Range    WBC 11.8 4.6 - 13.2 K/uL    RBC 4.60 4.20 - 5.30 M/uL    HGB 11.9 (L) 12.0 - 16.0 g/dL    HCT 37.8 35.0 - 45.0 %    MCV 82.2 74.0 - 97.0 FL    MCH 25.9 24.0 - 34.0 PG    MCHC 31.5 31.0 - 37.0 g/dL    RDW 15.2 (H) 11.6 - 14.5 %    PLATELET 491 790 - 606 K/uL    MPV 11.1 9.2 - 11.8 FL    NEUTROPHILS 80 (H) 42 - 75 %    LYMPHOCYTES 9 (L) 20 - 51 %    MONOCYTES 5 2 - 9 %    EOSINOPHILS 0 0 - 5 %    BASOPHILS 0 0 - 3 %    OTHER CELL 6 (H) 0      ABS. NEUTROPHILS 9.4 (H) 1.8 - 8.0 K/UL    ABS. LYMPHOCYTES 1.1 0.8 - 3.5 K/UL    ABS. MONOCYTES 0.6 0 - 1.0 K/UL    ABS. EOSINOPHILS 0.0 0.0 - 0.4 K/UL    ABS. BASOPHILS 0.0 0.0 - 0.06 K/UL    DF MANUAL      PLATELET COMMENTS ADEQUATE PLATELETS      RBC COMMENTS NORMOCYTIC, NORMOCHROMIC     METABOLIC PANEL, BASIC   Result Value Ref Range    Sodium 142 136 - 145 mmol/L    Potassium 3.3 (L) 3.5 - 5.5 mmol/L    Chloride 106 100 - 108 mmol/L    CO2 31 21 - 32 mmol/L    Anion gap 5 3.0 - 18 mmol/L    Glucose 123 (H) 74 - 99 mg/dL    BUN 13 7.0 - 18 MG/DL    Creatinine 0.75 0.6 - 1.3 MG/DL    BUN/Creatinine ratio 17 12 - 20      GFR est AA >60 >60 ml/min/1.73m2    GFR est non-AA >60 >60 ml/min/1.73m2    Calcium 8.4 (L) 8.5 - 10.1 MG/DL   CBC WITH AUTOMATED DIFF   Result Value Ref Range    WBC 13.4 (H) 4.6 - 13.2 K/uL    RBC 4.56 4.20 - 5.30 M/uL    HGB 12.1 12.0 - 16.0 g/dL    HCT 37.1 35.0 - 45.0 %    MCV 81.4 74.0 - 97.0 FL    MCH 26.5 24.0 - 34.0 PG    MCHC 32.6 31.0 - 37.0 g/dL    RDW 14.8 (H) 11.6 - 14.5 %    PLATELET 192 900 - 914 K/uL    MPV 10.4 9.2 - 11.8 FL    NEUTROPHILS 69 40 - 73 %    LYMPHOCYTES 22 21 - 52 %    MONOCYTES 9 3 - 10 %    EOSINOPHILS 0 0 - 5 %    BASOPHILS 0 0 - 2 %    ABS. NEUTROPHILS 9.2 (H) 1.8 - 8.0 K/UL    ABS. LYMPHOCYTES 2.9 0.9 - 3.6 K/UL    ABS. MONOCYTES 1.2 0.05 - 1.2 K/UL    ABS. EOSINOPHILS 0.0 0.0 - 0.4 K/UL    ABS.  BASOPHILS 0.1 (H) 0.0 - 0.06 K/UL    DF AUTOMATED     EKG, 12 LEAD, INITIAL   Result Value Ref Range    Ventricular Rate 103 BPM Atrial Rate 103 BPM    P-R Interval 112 ms    QRS Duration 84 ms    Q-T Interval 326 ms    QTC Calculation (Bezet) 427 ms    Calculated P Axis 59 degrees    Calculated R Axis 21 degrees    Calculated T Axis 18 degrees    Diagnosis       Sinus tachycardia  Otherwise normal ECG  When compared with ECG of 25-APR-2018 09:38,  Nonspecific T wave abnormality no longer evident in Lateral leads  Confirmed by Samuel Harvey ([id number]) on 4/27/2018 4:18:55 PM         ASSESSMENT and PLAN    ICD-10-CM ICD-9-CM    1. Pneumonia due to infectious organism, unspecified laterality, unspecified part of lung J18.9 136.9      484.8    2. Acute stress disorder F43.0 308.3    3. Depression, unspecified depression type F32.9 311      reviewed diet, exercise and weight control  reviewed medications and side effects in detail  radiology results and schedule of future radiology studies reviewed with patient    I have discussed the diagnosis with the patient and the intended plan of care as seen in the above orders. The patient has received an after-visit summary and questions were answered concerning future plans. I have discussed medication, side effects, and warnings with the patient in detail. The patient verbalized understanding and is in agreement with the plan of care. The patient will contact the office with any additional concerns. More than 50% of visit spent counseling and coordinating care with patient face to face on depression, acute stress and ways to cope with this. Discussed need for compliance with treatment regimen, follow-up, and taking responsibility for her disease process.       Mary Griffith MD

## 2019-09-18 ENCOUNTER — HOSPITAL ENCOUNTER (EMERGENCY)
Age: 49
Discharge: HOME | End: 2019-09-18
Attending: EMERGENCY MEDICINE

## 2019-09-18 VITALS
WEIGHT: 138 LBS | DIASTOLIC BLOOD PRESSURE: 84 MMHG | OXYGEN SATURATION: 95 % | HEIGHT: 58 IN | RESPIRATION RATE: 12 BRPM | BODY MASS INDEX: 28.97 KG/M2 | TEMPERATURE: 98 F | HEART RATE: 81 BPM | SYSTOLIC BLOOD PRESSURE: 126 MMHG

## 2019-09-18 DIAGNOSIS — H60.501 ACUTE OTITIS EXTERNA OF RIGHT EAR, UNSPECIFIED TYPE: Primary | ICD-10-CM

## 2019-09-18 RX ORDER — HYDROCODONE BITARTRATE AND ACETAMINOPHEN 7.5; 325 MG/1; MG/1
1 TABLET ORAL
Qty: 5 TAB | Refills: 0 | Status: SHIPPED | OUTPATIENT
Start: 2019-09-18 | End: 2019-09-20

## 2019-09-18 RX ORDER — NEOMYCIN SULFATE, POLYMYXIN B SULFATE AND HYDROCORTISONE 10; 3.5; 1 MG/ML; MG/ML; [USP'U]/ML
3 SUSPENSION/ DROPS AURICULAR (OTIC) 3 TIMES DAILY
Qty: 10 ML | Refills: 0 | Status: SHIPPED | OUTPATIENT
Start: 2019-09-18 | End: 2019-09-25

## 2019-09-18 NOTE — DISCHARGE INSTRUCTIONS
Patient Education        Swimmer's Ear: Care Instructions  Your Care Instructions    Swimmer's ear (otitis externa) is inflammation or infection of the ear canal. This is the passage that leads from the outer ear to the eardrum. Any water, sand, or other debris that gets into the ear canal and stays there can cause swimmer's ear. Putting cotton swabs or other items in the ear to clean it can also cause this problem. Swimmer's ear can be very painful. But you can treat the pain and infection with medicines. You should feel better in a few days. Follow-up care is a key part of your treatment and safety. Be sure to make and go to all appointments, and call your doctor if you are having problems. It's also a good idea to know your test results and keep a list of the medicines you take. How can you care for yourself at home? Cleaning and care  · Use antibiotic drops as your doctor directs. · Do not insert ear drops (other than the antibiotic ear drops) or anything else into the ear unless your doctor has told you to. · Avoid getting water in the ear until the problem clears up. Use cotton lightly coated with petroleum jelly as an earplug. Do not use plastic earplugs. · Use a hair dryer set on low to carefully dry the ear after you shower. · To ease ear pain, hold a warm washcloth against your ear. · Take pain medicines exactly as directed. ? If the doctor gave you a prescription medicine for pain, take it as prescribed. ? If you are not taking a prescription pain medicine, ask your doctor if you can take an over-the-counter medicine. Inserting ear drops  · Warm the drops to body temperature by rolling the container in your hands. Or you can place it in a cup of warm water for a few minutes. · Lie down, with your ear facing up. · Place drops inside the ear. Follow your doctor's instructions (or the directions on the label) for how many drops to use.  Gently wiggle the outer ear or pull the ear up and back to help the drops get into the ear. · It's important to keep the liquid in the ear canal for 3 to 5 minutes. When should you call for help? Call your doctor now or seek immediate medical care if:    · You have a new or higher fever.     · You have new or worse pain, swelling, warmth, or redness around or behind your ear.     · You have new or increasing pus or blood draining from your ear.    Watch closely for changes in your health, and be sure to contact your doctor if:    · You are not getting better after 2 days (48 hours). Where can you learn more? Go to http://polo-jay jay.info/. Enter C706 in the search box to learn more about \"Swimmer's Ear: Care Instructions. \"  Current as of: October 21, 2018  Content Version: 12.1  © 4774-9594 [employer]. Care instructions adapted under license by [employer] (which disclaims liability or warranty for this information). If you have questions about a medical condition or this instruction, always ask your healthcare professional. [address] any warranty or liability for your use of this information.

## 2019-09-20 NOTE — ED PROVIDER NOTES
[facility]  Emergency Department     May Sumner is a 52 y.o. female seen on 2019 at 2:27 AM in the [facility] EMERGENCY DEPT in room FT4/F4. Chief Complaint   Patient presents with    Ear Pain       HPI:   May Sumner is a 52 y.o. female who complaints of right ear pain for several days. She was seen here in the emergency department 2 days ago diagnosed with otitis media, prescribed Augmentin which she has been taking as prescribed. She complains of persistent discomfort in the ear and hearing sensation of being underwater.   She denies fever, cough, nausea, vomiting or diarrhea        Review of Systems:    CONST:  Denies: fever, chills, weakness  ENT: Denies: sore throat,  nasal congestion  EYES: Denies: vision changes, double vision, discharge  CARDIO: Denies: chest pain, palpitations  RESP: Denies: cough, shortness of breath, dyspnea on exertion  GI: Denies: abdominal pain, nausea, vomiting, diarrhea, melena, hematochezia  : Denies: dysuria, hematuria, urinary frequency  MUSC: Denies: muscle aches, joint pain  SKIN: Denies: hives, rash  PSYCH: Denies: anxiety, depression  ENDO: Denies: polyuria, polydipsia  Heme/Lymph:      Denies: easy bruising, bleeding  NEURO: Denies: headache, confusion, change in behavior, extremity,weakness, paresthesias    Past Medical History: Primary Care Doctor: Sunni Feldman MD     Past Medical History:   Diagnosis Date    Anxiety     Asthma     B[facility] 1 disorder ([facility]     Cancer ([facility]     bladder CA, cervical CA, skin CA    Fibromyalgia     Hiatal hernia     PE (pulmonary thromboembolism) ([facility]     Pneumonia     TIA (transient ischemic attack)      Past Surgical History:   Procedure Laterality Date    HX  SECTION      HX HYSTERECTOMY      HX OTHER SURGICAL      skin CA lesions removed L calf, L thigh     Social History     Socioeconomic History    Marital status:      Spouse name: Not on file    Number of children: Not on file    Years of education: Not on file    Highest education level: Not on file   Tobacco Use    Smoking status: Never Smoker    Smokeless tobacco: Never Used   Substance and Sexual Activity    Alcohol use: No    Drug use: No     No current facility-administered medications on file prior to encounter. Current Outpatient Medications on File Prior to Encounter   Medication Sig Dispense Refill    predniSONE (DELTASONE) 20 mg tablet Take 20 mg by mouth daily.  dextromethorphan-guaiFENesin (MUCINEX DM) 60-1,200 mg Tb12 Take 1 Tab by mouth every twelve (12) hours as needed for Cough. 30 Tab 0    brompheniramine-pseudoeph-DM (BROMFED DM) 2-30-10 mg/5 mL syrup Take 10 mL by mouth four (4) times daily as needed for Cough. 1 Bottle 0    nicotine (NICODERM CQ) 14 mg/24 hr patch 1 Patch by TransDERmal route every twenty-four (24) hours.  omalizumab (XOLAIR) 150 mg solr 375 mg by SubCUTAneous route every fourteen (14) days.  dupilumab (DUPIXENT) 300 mg/2 mL syrg syringe 300 mg by SubCUTAneous route every fourteen (14) days.  tiotropium (SPIRIVA WITH HANDIHALER) 18 mcg inhalation capsule Take 2 Caps by inhalation daily.  diphenhydrAMINE (BENADRYL) 25 mg capsule Take 25 mg by mouth every six (6) hours as needed.  diclofenac (VOLTAREN) 1 % gel Apply  to affected area four (4) times daily.  melatonin 10 mg tab Take  by mouth.  EPINEPHrine (EPIPEN) 0.3 mg/0.3 mL injection 0.3 mg by IntraMUSCular route once as needed.  LORazepam (ATIVAN) 1 mg tablet Take 1 Tab by mouth two (2) times a day. Max Daily Amount: 2 mg. 60 Tab 0    fluticasone-salmeterol (ADVAIR HFA) 230-21 mcg/actuation inhaler Take 2 Puffs by inhalation two (2) times a day.  albuterol (PROVENTIL HFA, VENTOLIN HFA, PROAIR HFA) 90 mcg/actuation inhaler Take 1 Puff by inhalation.  beclomethasone (QVAR) 80 mcg/actuation aero Take 1 Puff by inhalation two (2) times a day.         Allergies   Allergen Reactions  Pcn [Penicillins] Anaphylaxis    Strawberry Anaphylaxis    Sulfur Anaphylaxis    Adhesive Tape-Silicones Hives    Depakote [Divalproex] Other (comments)     Paralysis      Levaquin [Levofloxacin] Nausea and Vomiting    Seroquel [Quetiapine] Rash    Thorazine [Chlorpromazine] Other (comments)     Not sure      Toradol [Ketorolac] Seizures    Tramadol Seizures        Physical Exam:    Vitals:    09/18/19 1034   BP: 126/84   Pulse: 81   Resp: 12   Temp: 98 °F (36.7 °C)   SpO2: 95%     Vital signs were reviewed. Constitutional: well appearing, nontoxic  Head: Atraumatic, normo-cephalic  Ears/Nose/Throat: Right external ear canal is hyperemic, with minimal edema but no exudate. TM is also minimally inflamed with evidence of air-fluid middle ear. Throat clear, mucous membranes moist  Eyes: PERRL, EOMI  Neck: supple, FROM, no lymphadenopathy, no meningismus  Cardiovascular: regular rate and rhythm, no murmur, 2+ radial pulses  Respiratory: clear to auscultation with no wheezes, rales, ronchi  Back:  FROM, no CVA tenderness  Abdomen: soft, non-tender, no guarding/rebound, no percussion tenderness  Musculoskeletal: no deformities, edema  Skin: dry, no rashes  Neurologic: alert, oriented, answers questions follows commands  Psychiatric: Speech pattern and content normal     _________________________________________________________  Medical Decision Making:    Differential Diagnosis for this patient includes: Otitis externa, otitis media, URI,            _________________________________________________________  ED Evaluation    Labs: No results found for this or any previous visit (from the past 24 hour(s)).       Radiology studies performed: None  No orders to display           Medications - No data to display  _________________________________________________________  Procedures  ======================================================  ASSESSMENT: 60-year-old female who was recently treated with oral antibiotics for otitis media who also appears to have evidence of otitis externa.     PLAN: Continue taking Augmentin as prescribed, add Cortisporin otic to her current regimen.      _________________________________________________________    Condition: Stable   Disposition: Home   diagnosis: Otitis externa, otitis media     DEXTER Becker; 9/20/2019 @2:27 AM================================

## 2019-12-18 ENCOUNTER — HOSPITAL ENCOUNTER (EMERGENCY)
Age: 49
Discharge: HOME | End: 2019-12-18
Attending: EMERGENCY MEDICINE
Payer: MEDICARE

## 2019-12-18 VITALS
WEIGHT: 130 LBS | HEIGHT: 58 IN | SYSTOLIC BLOOD PRESSURE: 147 MMHG | TEMPERATURE: 97.5 F | DIASTOLIC BLOOD PRESSURE: 99 MMHG | BODY MASS INDEX: 27.29 KG/M2 | OXYGEN SATURATION: 98 % | HEART RATE: 88 BPM | RESPIRATION RATE: 18 BRPM

## 2019-12-18 DIAGNOSIS — R31.9 URINARY TRACT INFECTION WITH HEMATURIA, SITE UNSPECIFIED: Primary | ICD-10-CM

## 2019-12-18 DIAGNOSIS — N39.0 URINARY TRACT INFECTION WITH HEMATURIA, SITE UNSPECIFIED: Primary | ICD-10-CM

## 2019-12-18 LAB
APPEARANCE UR: ABNORMAL
BACTERIA URNS QL MICRO: ABNORMAL /HPF
BILIRUB UR QL: NEGATIVE
COLOR UR: YELLOW
EPITH CASTS URNS QL MICRO: ABNORMAL /LPF (ref 0–5)
GLUCOSE UR STRIP.AUTO-MCNC: NEGATIVE MG/DL
HGB UR QL STRIP: ABNORMAL
KETONES UR QL STRIP.AUTO: NEGATIVE MG/DL
LEUKOCYTE ESTERASE UR QL STRIP.AUTO: ABNORMAL
MUCOUS THREADS URNS QL MICRO: ABNORMAL /LPF
NITRITE UR QL STRIP.AUTO: POSITIVE
PH UR STRIP: 6.5 [PH] (ref 5–8)
PROT UR STRIP-MCNC: 30 MG/DL
RBC #/AREA URNS HPF: ABNORMAL /HPF (ref 0–5)
SP GR UR REFRACTOMETRY: 1.02 (ref 1–1.03)
UROBILINOGEN UR QL STRIP.AUTO: 0.2 EU/DL (ref 0.2–1)
WBC URNS QL MICRO: ABNORMAL /HPF (ref 0–4)

## 2019-12-18 PROCEDURE — 87186 SC STD MICRODIL/AGAR DIL: CPT

## 2019-12-18 PROCEDURE — 99282 EMERGENCY DEPT VISIT SF MDM: CPT

## 2019-12-18 PROCEDURE — 81001 URINALYSIS AUTO W/SCOPE: CPT

## 2019-12-18 PROCEDURE — 87086 URINE CULTURE/COLONY COUNT: CPT

## 2019-12-18 PROCEDURE — 87077 CULTURE AEROBIC IDENTIFY: CPT

## 2019-12-18 RX ORDER — PHENAZOPYRIDINE HYDROCHLORIDE 200 MG/1
200 TABLET, FILM COATED ORAL 3 TIMES DAILY
Qty: 6 TAB | Refills: 0 | Status: SHIPPED | OUTPATIENT
Start: 2019-12-18 | End: 2019-12-18

## 2019-12-18 RX ORDER — CEPHALEXIN 500 MG/1
500 CAPSULE ORAL 2 TIMES DAILY
Qty: 20 CAP | Refills: 0 | Status: SHIPPED | OUTPATIENT
Start: 2019-12-18 | End: 2019-12-28

## 2019-12-18 RX ORDER — PHENAZOPYRIDINE HYDROCHLORIDE 200 MG/1
200 TABLET, FILM COATED ORAL 3 TIMES DAILY
Qty: 6 TAB | Refills: 0 | Status: SHIPPED | OUTPATIENT
Start: 2019-12-18 | End: 2019-12-20

## 2019-12-18 RX ORDER — CEPHALEXIN 500 MG/1
500 CAPSULE ORAL 2 TIMES DAILY
Qty: 20 CAP | Refills: 0 | Status: SHIPPED | OUTPATIENT
Start: 2019-12-18 | End: 2019-12-18

## 2019-12-18 NOTE — ED PROVIDER NOTES
EMERGENCY DEPARTMENT HISTORY AND PHYSICAL EXAM    9:58 AM      Date: 12/18/2019  Patient Name: Melissa Kirby    History of Presenting Illness     No chief complaint on file. History Provided By: Patient    Additional History (Context): Melissa Kirby is a 52 y.o. female with hx of asthma, anxiety, bladder cancer, PE, TIA who presents with abdominal pain that radiates to her right flank and urinary frequency for 2 days. Pt reports history of collapsed ureter and reports her symptoms feel the same. Pt denies decrease in urinary output. . Pt denies fevers, nausea, vomiting, chest pain, shortness of breath. Pt has not taken any medications for the pain. PCP: Sukumar Harry MD    Current Outpatient Medications   Medication Sig Dispense Refill    cephALEXin (KEFLEX) 500 mg capsule Take 1 Cap by mouth two (2) times a day for 10 days. 20 Cap 0    phenazopyridine (PYRIDIUM) 200 mg tablet Take 1 Tab by mouth three (3) times daily for 2 days. 6 Tab 0    predniSONE (DELTASONE) 20 mg tablet Take 20 mg by mouth daily.  dextromethorphan-guaiFENesin (MUCINEX DM) 60-1,200 mg Tb12 Take 1 Tab by mouth every twelve (12) hours as needed for Cough. 30 Tab 0    brompheniramine-pseudoeph-DM (BROMFED DM) 2-30-10 mg/5 mL syrup Take 10 mL by mouth four (4) times daily as needed for Cough. 1 Bottle 0    nicotine (NICODERM CQ) 14 mg/24 hr patch 1 Patch by TransDERmal route every twenty-four (24) hours.  omalizumab (XOLAIR) 150 mg solr 375 mg by SubCUTAneous route every fourteen (14) days.  dupilumab (DUPIXENT) 300 mg/2 mL syrg syringe 300 mg by SubCUTAneous route every fourteen (14) days.  tiotropium (SPIRIVA WITH HANDIHALER) 18 mcg inhalation capsule Take 2 Caps by inhalation daily.  diphenhydrAMINE (BENADRYL) 25 mg capsule Take 25 mg by mouth every six (6) hours as needed.  diclofenac (VOLTAREN) 1 % gel Apply  to affected area four (4) times daily.       melatonin 10 mg tab Take  by mouth.      EPINEPHrine (EPIPEN) 0.3 mg/0.3 mL injection 0.3 mg by IntraMUSCular route once as needed.  LORazepam (ATIVAN) 1 mg tablet Take 1 Tab by mouth two (2) times a day. Max Daily Amount: 2 mg. 60 Tab 0    fluticasone-salmeterol (ADVAIR HFA) 230-21 mcg/actuation inhaler Take 2 Puffs by inhalation two (2) times a day.  albuterol (PROVENTIL HFA, VENTOLIN HFA, PROAIR HFA) 90 mcg/actuation inhaler Take 1 Puff by inhalation.  beclomethasone (QVAR) 80 mcg/actuation aero Take 1 Puff by inhalation two (2) times a day. Past History     Past Medical History:  Past Medical History:   Diagnosis Date    Anxiety     Asthma     Bipolar 1 disorder ([facility] [address]     Cancer (HCC)     bladder CA, cervical CA, skin CA    Fibromyalgia     Hiatal hernia     PE (pulmonary thromboembolism) (HCC)     Pneumonia     TIA (transient ischemic attack)        Past Surgical History:  Past Surgical History:   Procedure Laterality Date    HX  SECTION      HX HYSTERECTOMY      HX OTHER SURGICAL      skin CA lesions removed L calf, L thigh       Family History:  Family History   Problem Relation Age of Onset    Thyroid Disease Mother     Diabetes Mother     Heart Attack Mother        Social History:  Social History     Tobacco Use    Smoking status: Never Smoker    Smokeless tobacco: Never Used   Substance Use Topics    Alcohol use: No    Drug use: No       Allergies: Allergies   Allergen Reactions    Pcn [Penicillins] Anaphylaxis    Strawberry Anaphylaxis    Sulfur Anaphylaxis    Adhesive Tape-Silicones Hives    Depakote [Divalproex] Other (comments)     Paralysis      Levaquin [Levofloxacin] Nausea and Vomiting    Seroquel [Quetiapine] Rash    Thorazine [Chlorpromazine] Other (comments)     Not sure      Toradol [Ketorolac] Seizures    Tramadol Seizures         Review of Systems       Review of Systems   Constitutional: Negative for chills and fever.    Respiratory: Negative for shortness of breath. Cardiovascular: Negative for chest pain. Gastrointestinal: Positive for abdominal distention. Negative for abdominal pain, nausea and vomiting. Genitourinary: Positive for flank pain and frequency. Skin: Negative for rash. Neurological: Negative for weakness. All other systems reviewed and are negative. Physical Exam     Visit Vitals  BP (!) 147/99 (BP 1 Location: Left arm, BP Patient Position: Sitting)   Pulse 88   Temp 97.5 °F (36.4 °C)   Resp 18   Ht 4' 10\" (1.473 m)   Wt 59 kg (130 lb)   SpO2 98%   BMI 27.17 kg/m²         Physical Exam  Vitals signs reviewed. Constitutional:       General: She is not in acute distress. Appearance: Normal appearance. She is well-developed. She is not ill-appearing or toxic-appearing. Comments: Pt in no distress. HENT:      Head: Normocephalic and atraumatic. Eyes:      Conjunctiva/sclera: Conjunctivae normal.      Pupils: Pupils are equal, round, and reactive to light. Neck:      Musculoskeletal: Normal range of motion. Trachea: Trachea normal.   Cardiovascular:      Rate and Rhythm: Normal rate. Pulmonary:      Effort: Pulmonary effort is normal.      Breath sounds: Normal breath sounds and air entry. Abdominal:      General: Bowel sounds are normal. There is no distension or abdominal bruit. Palpations: Abdomen is soft. Abdomen is not rigid. There is no shifting dullness, fluid wave, mass or pulsatile mass. Tenderness: There is generalized tenderness and tenderness in the right upper quadrant, right lower quadrant and suprapubic area. There is no guarding. Negative signs include Garcia's sign and McBurney's sign. Neurological:      Mental Status: She is alert and oriented to person, place, and time. GCS: GCS eye subscore is 4. GCS verbal subscore is 5. GCS motor subscore is 6.            Diagnostic Study Results     Labs -  Recent Results (from the past 12 hour(s))   URINALYSIS W/ RFLX MICROSCOPIC    Collection Time: 12/18/19  9:25 AM   Result Value Ref Range    Color YELLOW      Appearance CLOUDY      Specific gravity 1.017 1.005 - 1.030      pH (UA) 6.5 5.0 - 8.0      Protein 30 (A) NEG mg/dL    Glucose NEGATIVE  NEG mg/dL    Ketone NEGATIVE  NEG mg/dL    Bilirubin NEGATIVE  NEG      Blood SMALL (A) NEG      Urobilinogen 0.2 0.2 - 1.0 EU/dL    Nitrites POSITIVE (A) NEG      Leukocyte Esterase LARGE (A) NEG     URINE MICROSCOPIC ONLY    Collection Time: 12/18/19  9:25 AM   Result Value Ref Range    WBC TOO NUMEROUS TO COUNT 0 - 4 /hpf    RBC 5 to 10 0 - 5 /hpf    Epithelial cells 1+ 0 - 5 /lpf    Bacteria 3+ (A) NEG /hpf    Mucus 2+ (A) NEG /lpf       Radiologic Studies -   No orders to display         Medical Decision Making   I am the first provider for this patient. I reviewed the vital signs, available nursing notes, past medical history, past surgical history, family history and social history. Vital Signs-Reviewed the patient's vital signs. Records Reviewed: Nursing Notes and Old Medical Records (Time of Review: 9:58 AM)    ED Course: Progress Notes, Reevaluation, and Consults:      Provider Notes (Medical Decision Making):   Pt updated of results of UA, she has a UTI. Pt prefers to be treated for UTI at this time and follow up with her urologist. Blood work and CT offered but pt wants to be treated for UTI at this time and refused blood work and [address]. Pt in no distress. On exam she had right abdominal tenderness on all quadrants , no guarding, or rigidity, no CVA tenderness. Urine sent for culture. Diagnosis     Clinical Impression:   1.  Urinary tract infection with hematuria, site unspecified        Disposition: home     Follow-up Information     Follow up With Specialties Details Why Contact Info    Abbey Null MD [facility] In 1 week  [address] [place] [address]      [facility] EMERGENCY DEPT Emergency Medicine  If symptoms worsen [address] [place] [address]  [phone]    Misti Sen MD Urology Schedule an appointment as soon as possible for a visit in 1 day  [facility] [place]  [phone]             Patient's Medications   Start Taking    CEPHALEXIN (KEFLEX) 500 MG CAPSULE    Take 1 Cap by mouth two (2) times a day for 10 days. PHENAZOPYRIDINE (PYRIDIUM) 200 MG TABLET    Take 1 Tab by mouth three (3) times daily for 2 days. Continue Taking    ALBUTEROL (PROVENTIL HFA, VENTOLIN HFA, PROAIR HFA) 90 MCG/ACTUATION INHALER    Take 1 Puff by inhalation. BECLOMETHASONE (QVAR) 80 MCG/ACTUATION AERO    Take 1 Puff by inhalation two (2) times a day. BROMPHENIRAMINE-PSEUDOEPH-DM (BROMFED DM) 2-30-10 MG/5 ML SYRUP    Take 10 mL by mouth four (4) times daily as needed for Cough. DEXTROMETHORPHAN-GUAIFENESIN (MUCINEX DM) 60-1,200 MG TB12    Take 1 Tab by mouth every twelve (12) hours as needed for Cough. DICLOFENAC (VOLTAREN) 1 % GEL    Apply  to affected area four (4) times daily. DIPHENHYDRAMINE (BENADRYL) 25 MG CAPSULE    Take 25 mg by mouth every six (6) hours as needed. DUPILUMAB (DUPIXENT) 300 MG/2 ML SYRG SYRINGE    300 mg by SubCUTAneous route every fourteen (14) days. EPINEPHRINE (EPIPEN) 0.3 MG/0.3 ML INJECTION    0.3 mg by IntraMUSCular route once as needed. FLUTICASONE-SALMETEROL (ADVAIR HFA) 230-21 MCG/ACTUATION INHALER    Take 2 Puffs by inhalation two (2) times a day. LORAZEPAM (ATIVAN) 1 MG TABLET    Take 1 Tab by mouth two (2) times a day. Max Daily Amount: 2 mg. MELATONIN 10 MG TAB    Take  by mouth. NICOTINE (NICODERM CQ) 14 MG/24 HR PATCH    1 Patch by TransDERmal route every twenty-four (24) hours. OMALIZUMAB (XOLAIR) 150 MG SOLR    375 mg by SubCUTAneous route every fourteen (14) days. PREDNISONE (DELTASONE) 20 MG TABLET    Take 20 mg by mouth daily.     TIOTROPIUM (SPIRIVA WITH HANDIHALER) 18 MCG INHALATION CAPSULE    Take 2 Caps by inhalation daily.   These Medications have changed    No medications on file   Stop Taking    No medications on file       Dictation disclaimer:  Please note that this dictation was completed with [employer], the computer voice recognition software. Quite often unanticipated grammatical, syntax, homophones, and other interpretive errors are inadvertently transcribed by the computer software. Please disregard these errors. Please excuse any errors that have escaped final proofreading.

## 2019-12-18 NOTE — DISCHARGE INSTRUCTIONS

## 2019-12-20 LAB
BACTERIA SPEC CULT: ABNORMAL
SERVICE CMNT-IMP: ABNORMAL

## 2020-01-14 ENCOUNTER — HOSPITAL ENCOUNTER (OUTPATIENT)
Dept: CT IMAGING | Age: 50
Discharge: HOME | End: 2020-01-14
Attending: PHYSICIAN ASSISTANT
Payer: MEDICARE

## 2020-01-14 ENCOUNTER — HOSPITAL ENCOUNTER (OUTPATIENT)
Dept: LAB | Age: 50
Discharge: HOME | End: 2020-01-14
Payer: MEDICAID

## 2020-01-14 DIAGNOSIS — Z85.51 HISTORY OF BLADDER CANCER: ICD-10-CM

## 2020-01-14 LAB — CREAT UR-MCNC: 0.6 MG/DL (ref 0.6–1.3)

## 2020-01-14 PROCEDURE — 36415 COLL VENOUS BLD VENIPUNCTURE: CPT

## 2020-01-14 PROCEDURE — 86317 IMMUNOASSAY INFECTIOUS AGENT: CPT

## 2020-01-14 PROCEDURE — 74178 CT ABD&PLV WO CNTR FLWD CNTR: CPT

## 2020-01-14 PROCEDURE — 82565 ASSAY OF CREATININE: CPT

## 2020-01-14 PROCEDURE — 74011636320 HC RX REV CODE- 636/320: Performed by: PHYSICIAN ASSISTANT

## 2020-01-14 RX ADMIN — IOPAMIDOL 100 ML: 755 INJECTION, SOLUTION INTRAVENOUS at 09:00

## 2020-01-24 LAB
PNEUMO AB TYPE 17 (17F): 2.4 UG/ML
PNEUMO AB TYPE 2*: 3.4 UG/ML
PNEUMO AB TYPE 20*: 3.4 UG/ML
PNEUMO AB TYPE 22 (22F)*: 1.2 UG/ML
PNEUMO AB TYPE 34 (10A)*: 1.4 UG/ML
PNEUMO AB TYPE 43 (11A)*: 0.8 UG/ML
PNEUMO AB TYPE 5*: 2 UG/ML
S PNEUM DA 15B IGG SER IA-MCNC: 3.1 UG/ML
S PNEUM DA 18C IGG SER IA-MCNC: 1.4 UG/ML
S PNEUM DA 19A IGG SER IA-MCNC: 15.6 UG/ML
S PNEUM DA 33F IGG SER IA-MCNC: 10.3 UG/ML
S PNEUM DA 6B IGG SER IA-MCNC: 1.2 UG/ML
S PNEUM DA 7F IGG SER IA-MCNC: 0.7 UG/ML
S PNEUM DA 9V IGG SER IA-MCNC: 4.1 UG/ML
STREP PNEUMO TYPE 1, 139091: 1.1 UG/ML
STREP PNEUMO TYPE 12, 139096: 1.5 UG/ML
STREP PNEUMO TYPE 14, 139097: >29.1 UG/ML
STREP PNEUMO TYPE 19, 139098: 15.7 UG/ML
STREP PNEUMO TYPE 23, 139099: 5.9 UG/ML
STREP PNEUMO TYPE 3, 139092: 14.8 UG/ML
STREP PNEUMO TYPE 4, 139093: 0.4 UG/ML
STREP PNEUMO TYPE 8, 139094: 3.8 UG/ML
STREP PNEUMO TYPE 9, 139095: 22.1 UG/ML

## 2020-03-14 ENCOUNTER — APPOINTMENT (OUTPATIENT)
Dept: CT IMAGING | Age: 50
End: 2020-03-14
Attending: EMERGENCY MEDICINE
Payer: MEDICAID

## 2020-03-14 ENCOUNTER — HOSPITAL ENCOUNTER (EMERGENCY)
Age: 50
Discharge: HOME | End: 2020-03-14
Attending: EMERGENCY MEDICINE
Payer: MEDICAID

## 2020-03-14 VITALS
TEMPERATURE: 98 F | HEART RATE: 95 BPM | DIASTOLIC BLOOD PRESSURE: 64 MMHG | RESPIRATION RATE: 23 BRPM | OXYGEN SATURATION: 96 % | SYSTOLIC BLOOD PRESSURE: 106 MMHG | WEIGHT: 140 LBS | BODY MASS INDEX: 29.39 KG/M2 | HEIGHT: 58 IN

## 2020-03-14 DIAGNOSIS — G89.4 CHRONIC PAIN SYNDROME: ICD-10-CM

## 2020-03-14 DIAGNOSIS — S20.211A CONTUSION OF RIGHT CHEST WALL, INITIAL ENCOUNTER: ICD-10-CM
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
## 2020-03-14 LAB
ALBUMIN SERPL-MCNC: 3.7 G/DL (ref 3.4–5)
ALBUMIN/GLOB SERPL: 0.9 {RATIO} (ref 0.8–1.7)
ALP SERPL-CCNC: 142 U/L (ref 45–117)
ALT SERPL-CCNC: 34 U/L (ref 13–56)
ANION GAP SERPL CALC-SCNC: 4 MMOL/L (ref 3–18)
AST SERPL-CCNC: 22 U/L (ref 10–38)
BASOPHILS # BLD: 0 K/UL (ref 0–0.1)
BASOPHILS NFR BLD: 1 % (ref 0–2)
BILIRUB SERPL-MCNC: 0.4 MG/DL (ref 0.2–1)
BUN SERPL-MCNC: 10 MG/DL (ref 7–18)
BUN/CREAT SERPL: 15 (ref 12–20)
CALCIUM SERPL-MCNC: 8.7 MG/DL (ref 8.5–10.1)
CHLORIDE SERPL-SCNC: 109 MMOL/L (ref 100–111)
CO2 SERPL-SCNC: 28 MMOL/L (ref 21–32)
CREAT SERPL-MCNC: 0.68 MG/DL (ref 0.6–1.3)
DIFFERENTIAL METHOD BLD: ABNORMAL
EOSINOPHIL # BLD: 0.4 K/UL (ref 0–0.4)
EOSINOPHIL NFR BLD: 6 % (ref 0–5)
ERYTHROCYTE [DISTWIDTH] IN BLOOD BY AUTOMATED COUNT: 14.3 % (ref 11.6–14.5)
GLOBULIN SER CALC-MCNC: 4.3 G/DL (ref 2–4)
GLUCOSE SERPL-MCNC: 105 MG/DL (ref 74–99)
HCG SERPL QL: NEGATIVE
HCT VFR BLD AUTO: 43.2 % (ref 35–45)
HGB BLD-MCNC: 14 G/DL (ref 12–16)
LYMPHOCYTES # BLD: 2.2 K/UL (ref 0.9–3.6)
LYMPHOCYTES NFR BLD: 35 % (ref 21–52)
MCH RBC QN AUTO: 27.1 PG (ref 24–34)
MCHC RBC AUTO-ENTMCNC: 32.4 G/DL (ref 31–37)
MCV RBC AUTO: 83.7 FL (ref 74–97)
MONOCYTES # BLD: 0.5 K/UL (ref 0.05–1.2)
MONOCYTES NFR BLD: 8 % (ref 3–10)
NEUTS SEG # BLD: 3.3 K/UL (ref 1.8–8)
NEUTS SEG NFR BLD: 50 % (ref 40–73)
PLATELET # BLD AUTO: 274 K/UL (ref 135–420)
PMV BLD AUTO: 11.3 FL (ref 9.2–11.8)
POTASSIUM SERPL-SCNC: 3.8 MMOL/L (ref 3.5–5.5)
PROT SERPL-MCNC: 8 G/DL (ref 6.4–8.2)
RBC # BLD AUTO: 5.16 M/UL (ref 4.2–5.3)
SODIUM SERPL-SCNC: 141 MMOL/L (ref 136–145)
WBC # BLD AUTO: 6.4 K/UL (ref 4.6–13.2)

## 2020-03-14 PROCEDURE — 99284 EMERGENCY DEPT VISIT MOD MDM: CPT

## 2020-03-14 PROCEDURE — 85025 COMPLETE CBC W/AUTO DIFF WBC: CPT

## 2020-03-14 PROCEDURE — 71250 CT THORAX DX C-: CPT

## 2020-03-14 PROCEDURE — 93005 ELECTROCARDIOGRAM TRACING: CPT

## 2020-03-14 PROCEDURE — 80053 COMPREHEN METABOLIC PANEL: CPT

## 2020-03-14 PROCEDURE — 84703 CHORIONIC GONADOTROPIN ASSAY: CPT

## 2020-03-14 RX ORDER — METAXALONE 800 MG/1
800 TABLET ORAL 4 TIMES DAILY
Qty: 20 TAB | Refills: 0 | Status: SHIPPED | OUTPATIENT
Start: 2020-03-14 | End: 2020-03-14

## 2020-03-14 RX ORDER — SENNOSIDES 25 MG/1
TABLET, FILM COATED ORAL
Qty: 1 TUBE | Refills: 0 | Status: SHIPPED | OUTPATIENT
Start: 2020-03-14 | End: 2020-10-09

## 2020-03-14 RX ORDER — SENNOSIDES 25 MG/1
TABLET, FILM COATED ORAL
Qty: 1 TUBE | Refills: 0 | Status: SHIPPED | OUTPATIENT
Start: 2020-03-14 | End: 2020-03-14

## 2020-03-14 RX ORDER — METAXALONE 800 MG/1
800 TABLET ORAL 4 TIMES DAILY
Qty: 20 TAB | Refills: 0 | Status: SHIPPED | OUTPATIENT
Start: 2020-03-14 | End: 2020-03-19
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
## 2020-03-15 LAB
ATRIAL RATE: 105 BPM
CALCULATED P AXIS, ECG09: 64 DEGREES
CALCULATED R AXIS, ECG10: 31 DEGREES
CALCULATED T AXIS, ECG11: 29 DEGREES
DIAGNOSIS, 93000: NORMAL
P-R INTERVAL, ECG05: 118 MS
Q-T INTERVAL, ECG07: 332 MS
QRS DURATION, ECG06: 80 MS
QTC CALCULATION (BEZET), ECG08: 438 MS
VENTRICULAR RATE, ECG03: 105 BPM

## 2020-04-02 ENCOUNTER — HOSPITAL ENCOUNTER (EMERGENCY)
Age: 50
Discharge: HOME | End: 2020-04-02
Attending: EMERGENCY MEDICINE
Payer: MEDICARE

## 2020-04-02 VITALS
TEMPERATURE: 98 F | HEIGHT: 58 IN | OXYGEN SATURATION: 95 % | WEIGHT: 140 LBS | SYSTOLIC BLOOD PRESSURE: 137 MMHG | HEART RATE: 82 BPM | BODY MASS INDEX: 29.39 KG/M2 | DIASTOLIC BLOOD PRESSURE: 92 MMHG | RESPIRATION RATE: 14 BRPM

## 2020-04-02 DIAGNOSIS — S20.01XD CONTUSION OF RIGHT BREAST, SUBSEQUENT ENCOUNTER: Primary | ICD-10-CM

## 2020-04-02 PROCEDURE — 99281 EMR DPT VST MAYX REQ PHY/QHP: CPT
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
## 2020-04-03 ENCOUNTER — PATIENT OUTREACH (OUTPATIENT)
Dept: FAMILY MEDICINE CLINIC | Facility: CLINIC | Age: 50
End: 2020-04-03
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
## 2020-04-17 ENCOUNTER — PATIENT OUTREACH (OUTPATIENT)
Dept: FAMILY MEDICINE CLINIC | Facility: CLINIC | Age: 50
End: 2020-04-17

## 2020-04-17 NOTE — PROGRESS NOTES
Patient resolved from Transition of Care episode on 4/17/2020  Patient/family has been provided the following resources and education related to COVID-19:                         Signs, symptoms and red flags related to COVID-19            CDC exposure and quarantine guidelines            [facility] exposure contact - [phone]            Contact for their local Department of Health                 Patient currently reports that the following symptoms have improved:  no new/worsening symptoms     No further outreach scheduled with this CTN/ACM. Episode of Care resolved. Patient has this CTN/ACM contact information if future needs arise.

## 2020-10-16 ENCOUNTER — HOSPITAL ENCOUNTER (EMERGENCY)
Age: 50
Discharge: HOME | End: 2020-10-16
Attending: EMERGENCY MEDICINE
Payer: MEDICAID

## 2020-10-16 ENCOUNTER — APPOINTMENT (OUTPATIENT)
Dept: GENERAL RADIOLOGY | Age: 50
End: 2020-10-16
Attending: EMERGENCY MEDICINE
Payer: MEDICAID

## 2020-10-16 VITALS
SYSTOLIC BLOOD PRESSURE: 128 MMHG | OXYGEN SATURATION: 98 % | RESPIRATION RATE: 18 BRPM | HEART RATE: 86 BPM | TEMPERATURE: 97 F | DIASTOLIC BLOOD PRESSURE: 78 MMHG

## 2020-10-16 DIAGNOSIS — R07.9 ACUTE CHEST PAIN: Primary | ICD-10-CM

## 2020-10-16 DIAGNOSIS — R00.2 PALPITATIONS: ICD-10-CM

## 2020-10-16 LAB
ALBUMIN SERPL-MCNC: 4 G/DL (ref 3.4–5)
ALBUMIN/GLOB SERPL: 1 {RATIO} (ref 0.8–1.7)
ALP SERPL-CCNC: 140 U/L (ref 45–117)
ALT SERPL-CCNC: 32 U/L (ref 13–56)
ANION GAP SERPL CALC-SCNC: 5 MMOL/L (ref 3–18)
AST SERPL-CCNC: 25 U/L (ref 10–38)
ATRIAL RATE: 94 BPM
BASOPHILS # BLD: 0 K/UL (ref 0–0.06)
BASOPHILS NFR BLD: 0 % (ref 0–3)
BILIRUB SERPL-MCNC: 0.2 MG/DL (ref 0.2–1)
BUN SERPL-MCNC: 14 MG/DL (ref 7–18)
BUN/CREAT SERPL: 18 (ref 12–20)
CALCIUM SERPL-MCNC: 9.4 MG/DL (ref 8.5–10.1)
CALCULATED P AXIS, ECG09: 81 DEGREES
CALCULATED R AXIS, ECG10: 19 DEGREES
CALCULATED T AXIS, ECG11: 34 DEGREES
CHLORIDE SERPL-SCNC: 106 MMOL/L (ref 100–111)
CK MB CFR SERPL CALC: 0.6 % (ref 0–4)
CK MB CFR SERPL CALC: 0.7 % (ref 0–4)
CK MB SERPL-MCNC: 1.1 NG/ML (ref 5–25)
CK MB SERPL-MCNC: 1.2 NG/ML (ref 5–25)
CK SERPL-CCNC: 151 U/L (ref 26–192)
CK SERPL-CCNC: 186 U/L (ref 26–192)
CO2 SERPL-SCNC: 30 MMOL/L (ref 21–32)
CREAT SERPL-MCNC: 0.78 MG/DL (ref 0.6–1.3)
D DIMER PPP FEU-MCNC: 0.43 UG/ML(FEU)
DIAGNOSIS, 93000: NORMAL
DIFFERENTIAL METHOD BLD: ABNORMAL
EOSINOPHIL # BLD: 0.3 K/UL (ref 0–0.4)
EOSINOPHIL NFR BLD: 3 % (ref 0–5)
ERYTHROCYTE [DISTWIDTH] IN BLOOD BY AUTOMATED COUNT: 14.5 % (ref 11.6–14.5)
GLOBULIN SER CALC-MCNC: 3.9 G/DL (ref 2–4)
GLUCOSE SERPL-MCNC: 111 MG/DL (ref 74–99)
HCT VFR BLD AUTO: 42.2 % (ref 35–45)
HGB BLD-MCNC: 13.7 G/DL (ref 12–16)
LYMPHOCYTES # BLD: 3.6 K/UL (ref 0.8–3.5)
LYMPHOCYTES NFR BLD: 41 % (ref 20–51)
MCH RBC QN AUTO: 27.1 PG (ref 24–34)
MCHC RBC AUTO-ENTMCNC: 32.5 G/DL (ref 31–37)
MCV RBC AUTO: 83.6 FL (ref 74–97)
MONOCYTES # BLD: 0.3 K/UL (ref 0–1)
MONOCYTES NFR BLD: 3 % (ref 2–9)
NEUTS BAND NFR BLD MANUAL: 1 % (ref 0–5)
NEUTS SEG # BLD: 4.6 K/UL (ref 1.8–8)
NEUTS SEG NFR BLD: 52 % (ref 42–75)
P-R INTERVAL, ECG05: 130 MS
PLATELET # BLD AUTO: 389 K/UL (ref 135–420)
PLATELET COMMENTS,PCOM: ABNORMAL
PMV BLD AUTO: 10.1 FL (ref 9.2–11.8)
POTASSIUM SERPL-SCNC: 3.6 MMOL/L (ref 3.5–5.5)
PROT SERPL-MCNC: 7.9 G/DL (ref 6.4–8.2)
Q-T INTERVAL, ECG07: 380 MS
QRS DURATION, ECG06: 84 MS
QTC CALCULATION (BEZET), ECG08: 475 MS
RBC # BLD AUTO: 5.05 M/UL (ref 4.2–5.3)
RBC MORPH BLD: ABNORMAL
SODIUM SERPL-SCNC: 141 MMOL/L (ref 136–145)
TROPONIN I SERPL-MCNC: <0.02 NG/ML (ref 0–0.04)
TROPONIN I SERPL-MCNC: <0.02 NG/ML (ref 0–0.04)
VENTRICULAR RATE, ECG03: 94 BPM
WBC # BLD AUTO: 8.8 K/UL (ref 4.6–13.2)

## 2020-10-16 PROCEDURE — 85379 FIBRIN DEGRADATION QUANT: CPT

## 2020-10-16 PROCEDURE — 80053 COMPREHEN METABOLIC PANEL: CPT

## 2020-10-16 PROCEDURE — 93005 ELECTROCARDIOGRAM TRACING: CPT

## 2020-10-16 PROCEDURE — 74011250636 HC RX REV CODE- 250/636: Performed by: EMERGENCY MEDICINE

## 2020-10-16 PROCEDURE — 71046 X-RAY EXAM CHEST 2 VIEWS: CPT

## 2020-10-16 PROCEDURE — 82550 ASSAY OF CK (CPK): CPT

## 2020-10-16 PROCEDURE — 96374 THER/PROPH/DIAG INJ IV PUSH: CPT

## 2020-10-16 PROCEDURE — 99283 EMERGENCY DEPT VISIT LOW MDM: CPT

## 2020-10-16 PROCEDURE — 85025 COMPLETE CBC W/AUTO DIFF WBC: CPT

## 2020-10-16 RX ORDER — MORPHINE SULFATE 4 MG/ML
2 INJECTION, SOLUTION INTRAMUSCULAR; INTRAVENOUS
Status: COMPLETED | OUTPATIENT
Start: 2020-10-16 | End: 2020-10-16

## 2020-10-16 RX ADMIN — MORPHINE SULFATE 2 MG: 4 INJECTION, SOLUTION INTRAMUSCULAR; INTRAVENOUS at 06:13

## 2020-10-16 NOTE — ED TRIAGE NOTES
Patient A/O x 4, complaining of sudden chest pain x less than 1 hour. Patient denies SOB, abdominal pain, vomiting but states she is nauseated.

## 2020-10-16 NOTE — ED NOTES
Shift report received from India De La Garza RN. Assumed care of patient. Received patient resting on stretcher, awake, alert, oriented x 4. Introduced myself as her primary nurse. Explanation of plan of care provided to the patient, patient verbalized understanding. VSS, refer to flow sheet.

## 2020-10-16 NOTE — ED NOTES
Patient was turned over to me at the regular change of shift by Dr. Home Gonzalez, at 0700. Patient presented today with Chest pain. Follow-up tests include repeat troponin. Plan for this patient is: Follow up on Troponin, d/c if negative. Course:   ED Course as of Oct 16 1900   Fri Oct 16, 2020   3151 Patient is a second troponin is negative. Patient has been pain-free since arrival.  Her d-dimer is negative. Patient was reassessed at bedside. Reports that she has mild soreness in the sternal area at present however the pain she was feeling before has resolved. She wonders if it could be related to stress or anxiety. Evidently recently she was told that she had to move out of her current living situation. I will suspect ACS or PE as the etiology for the patient's chest pain. Could be musculoskeletal or esophageal in nature. Patient will be discharged home, advised to return if symptoms are worsening or changing any way in the meantime, follow-up with primary care doctor in the next couple of days. [GRETA]      ED Course User Index  [GRETA] Disha Rubi, DO Miranda Malagon,     Diagnosis:   1. Acute chest pain    2. Palpitations          Disposition: Discharge    Follow-up Information     Follow up With Specialties Details Why Contact Info    Matthew Brown Physician Assistant Schedule an appointment as soon as possible for a visit for office follow up [address] [facility] [address]      [facility] EMERGENCY DEPT Emergency Medicine  As needed, If symptoms worsen [address] [facility] [address]  [phone]          Discharge Medication List as of 10/16/2020  8:49 AM      CONTINUE these medications which have NOT CHANGED    Details   clindamycin (CLEOCIN) 300 mg capsule Take 1 Cap by mouth four (4) times daily for 10 days. , Normal, Disp-40 Cap,R-0      ramelteon (ROZEREM) 8 mg tablet Take  by mouth nightly., Historical Med      fluticasone propionate (FLONASE) 50 mcg/actuation nasal spray Historical Med      omalizumab (XOLAIR) 150 mg solr 375 mg by SubCUTAneous route every fourteen (14) days. , Historical Med      dupilumab (DUPIXENT) 300 mg/2 mL syrg syringe 300 mg by SubCUTAneous route every fourteen (14) days. , Historical Med      diphenhydrAMINE (BENADRYL) 25 mg capsule Take 25 mg by mouth every six (6) hours as needed., Historical Med      melatonin 10 mg tab Take  by mouth., Historical Med      EPINEPHrine (EPIPEN) 0.3 mg/0.3 mL injection 0.3 mg by IntraMUSCular route once as needed., Historical Med      LORazepam (ATIVAN) 1 mg tablet Take 1 Tab by mouth two (2) times a day. Max Daily Amount: 2 mg., Print, Disp-60 Tab, R-0      fluticasone-salmeterol (ADVAIR HFA) 230-21 mcg/actuation inhaler Take 2 Puffs by inhalation two (2) times a day., Historical Med      albuterol (PROVENTIL HFA, VENTOLIN HFA, PROAIR HFA) 90 mcg/actuation inhaler Take 1 Puff by inhalation. , Historical Med

## 2020-10-16 NOTE — DISCHARGE INSTRUCTIONS
Patient Education        Chest Pain: Care Instructions  Your Care Instructions     There are many things that can cause chest pain. Some are not serious and will get better on their own in a few days. But some kinds of chest pain need more testing and treatment. Your doctor may have recommended a follow-up visit in the next 8 to 12 hours. If you are not getting better, you may need more tests or treatment. Even though your doctor has released you, you still need to watch for any problems. The doctor carefully checked you, but sometimes problems can develop later. If you have new symptoms or if your symptoms do not get better, get medical care right away. If you have worse or different chest pain or pressure that lasts more than 5 minutes or you passed out (lost consciousness), call 911 or seek other emergency help right away. A medical visit is only one step in your treatment. Even if you feel better, you still need to do what your doctor recommends, such as going to all suggested follow-up appointments and taking medicines exactly as directed. This will help you recover and help prevent future problems. How can you care for yourself at home? · Rest until you feel better. · Take your medicine exactly as prescribed. Call your doctor if you think you are having a problem with your medicine. · Do not drive after taking a prescription pain medicine. When should you call for help? Call 911 if:     · You passed out (lost consciousness).     · You have severe difficulty breathing.     · You have symptoms of a heart attack. These may include:  ? Chest pain or pressure, or a strange feeling in your chest.  ? Sweating. ? Shortness of breath. ? Nausea or vomiting. ? Pain, pressure, or a strange feeling in your back, neck, jaw, or upper belly or in one or both shoulders or arms. ? Lightheadedness or sudden weakness. ? A fast or irregular heartbeat.   After you call 911, the  may tell you to chew 1 adult-strength or 2 to 4 low-dose aspirin. Wait for an ambulance. Do not try to drive yourself. Call your doctor today if:     · You have any trouble breathing.     · Your chest pain gets worse.     · You are dizzy or lightheaded, or you feel like you may faint.     · You are not getting better as expected.     · You are having new or different chest pain. Where can you learn more? Go to http://www.calle.com/  Enter A120 in the search box to learn more about \"Chest Pain: Care Instructions. \"  Current as of: June 26, 2019               Content Version: 12.6  © 8338-8619 [employer]. Care instructions adapted under license by [employer] (which disclaims liability or warranty for this information). If you have questions about a medical condition or this instruction, always ask your healthcare professional. [place] [address] any warranty or liability for your use of this information. Patient Education        Palpitations: Care Instructions  Your Care Instructions     Heart palpitations are the uncomfortable sensation that your heart is beating fast or irregularly. You might feel pounding or fluttering in your chest. It might feel like your heart is skipping a beat. Although palpitations may be caused by a heart problem, they also occur because of stress, fatigue, or use of alcohol, caffeine, or nicotine. Many medicines, including diet pills, antihistamines, decongestants, and some herbal products, can cause heart palpitations. Nearly everyone has palpitations from time to time. Depending on your symptoms, your doctor may need to do more tests to try to find the cause of your palpitations. Follow-up care is a key part of your treatment and safety. Be sure to make and go to all appointments, and call your doctor if you are having problems. It's also a good idea to know your test results and keep a list of the medicines you take.   How can you care for yourself at home? · Avoid caffeine, nicotine, and excess alcohol. · Do not take illegal drugs, such as methamphetamines and cocaine. · Do not take weight loss or diet medicines unless you talk with your doctor first.  · Get plenty of sleep. · Do not overeat. · If you have palpitations again, take deep breaths and try to relax. This may slow a racing heart. · If you start to feel lightheaded, lie down to avoid injuries that might result if you pass out and fall down. · Keep a record of your palpitations and bring it to your next doctor's appointment. Write down:  ? The date and time. ? Your pulse. (If your heart is beating fast, it may be hard to count your pulse.)  ? What you were doing when the palpitations started. ? How long the palpitations lasted. ? Any other symptoms. · If an activity causes palpitations, slow down or stop. Talk to your doctor before you do that activity again. · Take your medicines exactly as prescribed. Call your doctor if you think you are having a problem with your medicine. When should you call for help? Call 911 anytime you think you may need emergency care. For example, call if:    · You passed out (lost consciousness).     · You have symptoms of a heart attack. These may include:  ? Chest pain or pressure, or a strange feeling in the chest.  ? Sweating. ? Shortness of breath. ? Pain, pressure, or a strange feeling in the back, neck, jaw, or upper belly or in one or both shoulders or arms. ? Lightheadedness or sudden weakness. ? A fast or irregular heartbeat. After you call 911, the  may tell you to chew 1 adult-strength or 2 to 4 low-dose aspirin. Wait for an ambulance. Do not try to drive yourself.     · You have symptoms of a stroke. These may include:  ? Sudden numbness, tingling, weakness, or loss of movement in your face, arm, or leg, especially on only one side of your body. ? Sudden vision changes. ? Sudden trouble speaking.   ? Sudden confusion or trouble understanding simple statements. ? Sudden problems with walking or balance. ? A sudden, severe headache that is different from past headaches. Call your doctor now or seek immediate medical care if:    · You have heart palpitations and:  ? Are dizzy or lightheaded, or you feel like you may faint. ? Have new or increased shortness of breath. Watch closely for changes in your health, and be sure to contact your doctor if:    · You continue to have heart palpitations. Where can you learn more? Go to http://www.gray.com/  Enter R508 in the search box to learn more about \"Palpitations: Care Instructions. \"  Current as of: December 16, 2019               Content Version: 12.6  © 1575-6081 [employer]. Care instructions adapted under license by [employer] (which disclaims liability or warranty for this information). If you have questions about a medical condition or this instruction, always ask your healthcare professional. [address] any warranty or liability for your use of this information.

## 2020-10-16 NOTE — ED PROVIDER NOTES
Dagmar Alexander is a 48 y.o. female with past medical history of bipolar, asthma, fibromyalgia, and history of previous PE not currently on anticoagulation coming in with acute chest pain. Patient states that she went out with her sister and was feeling fine all mind about 3 AM she was in bed and she suddenly felt her heart racing. She also reports sharp right sided chest pain which is constant and nonradiating. She denies any shortness of breath. Denies any cough or fever. Denies any leg swelling or leg pain. Denies any hemoptysis. Patient did not take anything for the pain. Has no other complaints at this time.            Past Medical History:   Diagnosis Date    Anxiety     Asthma     Bipolar 1 disorder ([facility] [address]     Cancer (HCC)     bladder CA, cervical CA, skin CA    Fibromyalgia     Hiatal hernia     History of bladder cancer     Microscopic hematuria     Nocturia     PE (pulmonary thromboembolism) (HCC)     Pneumonia     TIA (transient ischemic attack)        Past Surgical History:   Procedure Laterality Date    HX  SECTION      HX HYSTERECTOMY      HX OTHER SURGICAL      skin CA lesions removed L calf, L thigh         Family History:   Problem Relation Age of Onset    Thyroid Disease Mother     Diabetes Mother     Heart Attack Mother        Social History     Socioeconomic History    Marital status: SINGLE     Spouse name: Not on file    Number of children: Not on file    Years of education: Not on file    Highest education level: Not on file   Occupational History    Not on file   Social Needs    Financial resource strain: Not on file    Food insecurity     Worry: Not on file     Inability: Not on file    Transportation needs     Medical: Not on file     Non-medical: Not on file   Tobacco Use    Smoking status: Never Smoker    Smokeless tobacco: Never Used   Substance and Sexual Activity    Alcohol use: No    Drug use: No    Sexual activity: Not on file Lifestyle    Physical activity     Days per week: Not on file     Minutes per session: Not on file    Stress: Not on file   Relationships    Social connections     Talks on phone: Not on file     Gets together: Not on file     Attends Orthodox service: Not on file     Active member of club or organization: Not on file     Attends meetings of clubs or organizations: Not on file     Relationship status: Not on file    Intimate partner violence     Fear of current or ex partner: Not on file     Emotionally abused: Not on file     Physically abused: Not on file     Forced sexual activity: Not on file   Other Topics Concern    Not on file   Social History Narrative    Not on file         ALLERGIES: Pcn [penicillins]; Strawberry; Sulfur; Adhesive tape-silicones; Depakote [divalproex]; Keflex [cephalexin]; Levaquin [levofloxacin]; Seroquel [quetiapine]; Thorazine [chlorpromazine]; Toradol [ketorolac]; and Tramadol    Review of Systems   Constitutional: Negative. Negative for chills and fever. Respiratory: Negative. Negative for cough and shortness of breath. Cardiovascular: Positive for chest pain and palpitations. Negative for leg swelling. Gastrointestinal: Negative. Negative for abdominal pain, nausea and vomiting. Musculoskeletal: Negative. Negative for myalgias. Skin: Negative. Negative for rash. Neurological: Negative. Negative for dizziness, weakness and light-headedness. All other systems reviewed and are negative. Vitals:    10/16/20 0413   BP: (!) 168/96   Pulse: 89   Resp: 19   Temp: 97 °F (36.1 °C)   SpO2: 99%            Physical Exam  Vitals signs reviewed. Constitutional:       General: She is not in acute distress. Appearance: Normal appearance. She is well-developed. HENT:      Head: Normocephalic and atraumatic. Mouth/Throat:      Mouth: Mucous membranes are moist.   Eyes:      Extraocular Movements: Extraocular movements intact.       Conjunctiva/sclera: Conjunctivae normal.      Pupils: Pupils are equal, round, and reactive to light. Neck:      Musculoskeletal: Normal range of motion and neck supple. Cardiovascular:      Rate and Rhythm: Normal rate and regular rhythm. Heart sounds: S1 normal and S2 normal. No murmur. No friction rub. No gallop. Comments: Reproducible tenderness of the anterior chest wall. No crepitus deformity. No overlying skin changes. Pulmonary:      Effort: Pulmonary effort is normal. No accessory muscle usage or respiratory distress. Breath sounds: Normal breath sounds. Abdominal:      General: There is no distension. Musculoskeletal: Normal range of motion. General: No tenderness. Right lower leg: No edema. Left lower leg: No edema. Skin:     General: Skin is warm. Findings: No rash. Neurological:      General: No focal deficit present. Mental Status: She is alert and oriented to person, place, and time. Psychiatric:         Speech: Speech normal.          MDM  Number of Diagnoses or Management Options  Acute chest pain:   Diagnosis management comments: Jasvir López is a 48 y.o. female coming in with palpitations and atypical right-sided chest pain. Patient does have a history of PE, however her heart rate is in the 80s with normal respiratory rate and O2 sats. No hypoxia or shortness of breath. Dimer negative. Do not feel that she needs further emergent work-up for PE at this time. Will get second set of cardiac enzymes and plan for discharge home with outpatient follow-up.          Procedures      Vitals:  Patient Vitals for the past 12 hrs:   Temp Pulse Resp BP SpO2   10/16/20 0413 97 °F (36.1 °C) 89 19 (!) 168/96 99 %       Medications ordered:   Medications   morphine injection 2 mg (2 mg IntraVENous Given 10/16/20 9777)         Lab findings:  Recent Results (from the past 12 hour(s))   EKG, 12 LEAD, INITIAL    Collection Time: 10/16/20  3:56 AM   Result Value Ref Range    Ventricular Rate 94 BPM    Atrial Rate 94 BPM    P-R Interval 130 ms    QRS Duration 84 ms    Q-T Interval 380 ms    QTC Calculation (Bezet) 475 ms    Calculated P Axis 81 degrees    Calculated R Axis 19 degrees    Calculated T Axis 34 degrees    Diagnosis       Normal sinus rhythm  Normal ECG  When compared with ECG of 14-MAR-2020 12:02,  Nonspecific T wave abnormality no longer evident in Lateral leads     CBC WITH AUTOMATED DIFF    Collection Time: 10/16/20  4:17 AM   Result Value Ref Range    WBC 8.8 4.6 - 13.2 K/uL    RBC 5.05 4.20 - 5.30 M/uL    HGB 13.7 12.0 - 16.0 g/dL    HCT 42.2 35.0 - 45.0 %    MCV 83.6 74.0 - 97.0 FL    MCH 27.1 24.0 - 34.0 PG    MCHC 32.5 31.0 - 37.0 g/dL    RDW 14.5 11.6 - 14.5 %    PLATELET 161 100 - 178 K/uL    MPV 10.1 9.2 - 11.8 FL    NEUTROPHILS 52 42 - 75 %    BAND NEUTROPHILS 1 0 - 5 %    LYMPHOCYTES 41 20 - 51 %    MONOCYTES 3 2 - 9 %    EOSINOPHILS 3 0 - 5 %    BASOPHILS 0 0 - 3 %    ABS. NEUTROPHILS 4.6 1.8 - 8.0 K/UL    ABS. LYMPHOCYTES 3.6 (H) 0.8 - 3.5 K/UL    ABS. MONOCYTES 0.3 0 - 1.0 K/UL    ABS. EOSINOPHILS 0.3 0.0 - 0.4 K/UL    ABS. BASOPHILS 0.0 0.0 - 0.06 K/UL    DF MANUAL      PLATELET COMMENTS ADEQUATE PLATELETS      RBC COMMENTS NORMOCYTIC, NORMOCHROMIC     METABOLIC PANEL, COMPREHENSIVE    Collection Time: 10/16/20  4:17 AM   Result Value Ref Range    Sodium 141 136 - 145 mmol/L    Potassium 3.6 3.5 - 5.5 mmol/L    Chloride 106 100 - 111 mmol/L    CO2 30 21 - 32 mmol/L    Anion gap 5 3.0 - 18 mmol/L    Glucose 111 (H) 74 - 99 mg/dL    BUN 14 7.0 - 18 MG/DL    Creatinine 0.78 0.6 - 1.3 MG/DL    BUN/Creatinine ratio 18 12 - 20      GFR est AA >60 >60 ml/min/1.73m2    GFR est non-AA >60 >60 ml/min/1.73m2    Calcium 9.4 8.5 - 10.1 MG/DL    Bilirubin, total 0.2 0.2 - 1.0 MG/DL    ALT (SGPT) 32 13 - 56 U/L    AST (SGOT) 25 10 - 38 U/L    Alk.  phosphatase 140 (H) 45 - 117 U/L    Protein, total 7.9 6.4 - 8.2 g/dL    Albumin 4.0 3.4 - 5.0 g/dL    Globulin 3.9 2.0 - 4.0 g/dL    A-G Ratio 1.0 0.8 - 1.7     CARDIAC PANEL,(CK, CKMB & TROPONIN)    Collection Time: 10/16/20  4:17 AM   Result Value Ref Range    CK - MB 1.2 <3.6 ng/ml    CK-MB Index 0.6 0.0 - 4.0 %     26 - 192 U/L    Troponin-I, QT <0.02 0.0 - 0.045 NG/ML   D DIMER    Collection Time: 10/16/20  5:50 AM   Result Value Ref Range    D DIMER 0.43 <0.46 ug/ml(FEU)       EKG interpretation by ED Physician: Sinus rhythm rate of 94 bpm.  No acute ischemic changes or evidence of acute right heart strain. X-Ray, CT or other radiology findings or impressions:  XR CHEST PA LAT    (Results Pending)   No acute process    Progress notes, Consult notes or additional Procedure notes:     Reevaluation of patient:   Patient felt much better after small dose of IV morphine here. Vitals remained stable. D-dimer negative. Will get second set of cardiac enzymes and if negative plan for outpatient follow-up. Patient care transferred to incoming physician pending second set of cardiac enzymes and disposition. Disposition:  Diagnosis:   1. Acute chest pain    2. Palpitations        Disposition: Pending    Follow-up Information     Follow up With Specialties Details Why Contact Info    [place], [address] Paulino Freedman Physician Assistant Schedule an appointment as soon as possible for a visit for office follow up [address] [facility] [address] [facility] [address]      [address] [facility] EMERGENCY DEPT Emergency Medicine  As needed, If symptoms worsen [address] [place] [address] [id number] [phone]           Patient's Medications   Start Taking    No medications on file   Continue Taking    ALBUTEROL (PROVENTIL HFA, VENTOLIN HFA, PROAIR HFA) 90 MCG/ACTUATION INHALER    Take 1 Puff by inhalation. CLINDAMYCIN (CLEOCIN) 300 MG CAPSULE    Take 1 Cap by mouth four (4) times daily for 10 days. DIPHENHYDRAMINE (BENADRYL) 25 MG CAPSULE    Take 25 mg by mouth every six (6) hours as needed.     DUPILUMAB (DUPIXENT) 300 MG/2 ML SYRG SYRINGE    300 mg by SubCUTAneous route every fourteen (14) days. EPINEPHRINE (EPIPEN) 0.3 MG/0.3 ML INJECTION    0.3 mg by IntraMUSCular route once as needed. FLUTICASONE PROPIONATE (FLONASE) 50 MCG/ACTUATION NASAL SPRAY        FLUTICASONE-SALMETEROL (ADVAIR HFA) 230-21 MCG/ACTUATION INHALER    Take 2 Puffs by inhalation two (2) times a day. LORAZEPAM (ATIVAN) 1 MG TABLET    Take 1 Tab by mouth two (2) times a day. Max Daily Amount: 2 mg. MELATONIN 10 MG TAB    Take  by mouth. OMALIZUMAB (XOLAIR) 150 MG SOLR    375 mg by SubCUTAneous route every fourteen (14) days. RAMELTEON (ROZEREM) 8 MG TABLET    Take  by mouth nightly.    These Medications have changed    No medications on file   Stop Taking    No medications on file

## 2020-11-05 ENCOUNTER — TRANSCRIBE ORDER (OUTPATIENT)
Dept: SCHEDULING | Age: 50
End: 2020-11-05

## 2020-11-05 DIAGNOSIS — Z12.31 VISIT FOR SCREENING MAMMOGRAM: Primary | ICD-10-CM

## 2021-06-17 ENCOUNTER — OFFICE VISIT (OUTPATIENT)
Dept: ORTHOPEDIC SURGERY | Age: 51
End: 2021-06-17
Payer: MEDICARE

## 2021-06-17 VITALS
HEIGHT: 58 IN | OXYGEN SATURATION: 100 % | WEIGHT: 153.8 LBS | BODY MASS INDEX: 32.28 KG/M2 | HEART RATE: 96 BPM | TEMPERATURE: 96.8 F

## 2021-06-17 DIAGNOSIS — M79.7 FIBROMYALGIA: ICD-10-CM

## 2021-06-17 DIAGNOSIS — G56.01 CARPAL TUNNEL SYNDROME OF RIGHT WRIST: ICD-10-CM

## 2021-06-17 DIAGNOSIS — G56.01 CARPAL TUNNEL SYNDROME OF RIGHT WRIST: Primary | ICD-10-CM

## 2021-06-17 DIAGNOSIS — G47.8 NON-RESTORATIVE SLEEP: ICD-10-CM

## 2021-06-17 PROCEDURE — 99204 OFFICE O/P NEW MOD 45 MIN: CPT | Performed by: PHYSICAL MEDICINE & REHABILITATION

## 2021-06-17 PROCEDURE — G9899 SCRN MAM PERF RSLTS DOC: HCPCS | Performed by: PHYSICAL MEDICINE & REHABILITATION

## 2021-06-17 PROCEDURE — 3017F COLORECTAL CA SCREEN DOC REV: CPT | Performed by: PHYSICAL MEDICINE & REHABILITATION

## 2021-06-17 PROCEDURE — G8417 CALC BMI ABV UP PARAM F/U: HCPCS | Performed by: PHYSICAL MEDICINE & REHABILITATION

## 2021-06-17 PROCEDURE — G8427 DOCREV CUR MEDS BY ELIG CLIN: HCPCS | Performed by: PHYSICAL MEDICINE & REHABILITATION

## 2021-06-17 PROCEDURE — G8432 DEP SCR NOT DOC, RNG: HCPCS | Performed by: PHYSICAL MEDICINE & REHABILITATION

## 2021-06-17 RX ORDER — FLUOXETINE HYDROCHLORIDE 20 MG/1
CAPSULE ORAL
COMMUNITY
Start: 2021-04-06

## 2021-06-17 RX ORDER — BUDESONIDE AND FORMOTEROL FUMARATE DIHYDRATE 160; 4.5 UG/1; UG/1
AEROSOL RESPIRATORY (INHALATION)
COMMUNITY
Start: 2020-12-04 | End: 2021-09-20

## 2021-06-17 RX ORDER — LIDOCAINE 4 G/100G
1 PATCH TOPICAL AS NEEDED
COMMUNITY
Start: 2021-05-04 | End: 2021-09-20

## 2021-06-17 RX ORDER — CYCLOBENZAPRINE HCL 10 MG
10 TABLET ORAL
COMMUNITY
Start: 2021-06-02

## 2021-06-17 NOTE — PROGRESS NOTES
[address]  [address], [address]  [place], [address] [place] [address] Street  Phone: [phone]  Fax: [phone]        Ignacia Trejo  : 1970  PCP: Petra Allison, [address]    NEW PATIENT EVALUATION      ASSESSMENT AND PLAN    Diagnoses and all orders for this visit:    1. Carpal tunnel syndrome of right wrist  -     EMG ONE EXTREMITY UPPER RT; Future    2. Fibromyalgia    3. Non-restorative sleep  -     REFERRAL TO NEUROLOGY         1. George Beatty is a 46 y.o. female right-handed, with long history of fibromyalgia. She is having worsening of her pain as well as paresthesias right greater than left distal upper extremity. She has a nonfocal neurologic exam except for poor balance and weakness of her right intrinsics and pinch. 2. Refer for right EMG evaluate for carpal tunnel  3. OTC wrist splint nightly  4. Continue daily home stretches, aqua arthritis exercises if available  5. Neuro referral for JOSE. I believe this is part of what is propagating her fibromyalgia. Otherwise she is very active and wants to be athletic and lose more weight. 6. Failed all FDA approved medications for fibromyalgia, recommend OTC topicals  Follow-up and Dispositions    · Return for post EMG. HISTORY OF PRESENT ILLNESS  George Beatty is seen today [facility]  Pain Assessment  2021   Location of Pain Neck;Back;Arm; Foot   Location Modifiers Left;Right   Severity of Pain 6   Quality of Pain Throbbing; Sharp;Dull;Aching;Burning; Other (Comment)   Quality of Pain Comment N/T in hands and feet   Frequency of Pain Constant   Aggravating Factors Standing;Walking;Bending; Other (Comment)   Aggravating Factors Comment lifting   Relieving Factors Other (Comment)   Relieving Factors Comment lying down       Onset of pain: 15 years ago, recent flare last month, couldn't get OOB. ED . Steroids no benefit.  PCP sent for xray (noted below). Does patient have numbness/tingling in extremities: chronic N/T forearms, feet-chronic. Denies persistent fevers, chills, weight changes, saddle paresthesias, and neurogenic bowel or bladder symptoms. + night sweats-menopausal    RHD. Had rheum eval for + ANAI, told due to FM. Walks 4 miles/day  In PM 8-10 years ago in [facility]. Poor sleep maximum 4 hours even with the use of melatonin, Ativan, Flexeril and Rozerem (melatonin agonist)    Investigations:   5/2021 C XR mild degenerative changes C5/6/7, minimal degen changes at TL jx  Spine surgery consult: NA  Mom had spine sx? thoracic    Treatments:  Physical therapy: 3 rounds , short term benefit. Doing HEP, yoga  Spinal injections: TPI yrs ago, helped 3 days max  Spinal surgery- no  Beneficial medications: Percocet, Flexeril- helps a little  Failed medications: Zanaflex (too sedated), Prednisone,Gabapentin (no benefit, 100# weight gain), Lyrica (edema), Cymbalta ( couldn't see), Savella (lethagic), APAP, Ibu, Lidoderm patch, Trazadone. 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  Pertinent PMHx:  FM x 15 years, dx by rheum, migraines, cervical CA, multiple PEs (doesn't want to be on anti-coagulants, now on ASA, last about 10 years ago), TIA, bipolar, hx miscarriage. Physical Exam:   NAD  Neck midline  Multiple trigger points: Bilateral upper traps, levator scapula, lumbar paraspinals  Right hand decreased sensation to light touch  Upper extremity strength intact except for decreased pinch for/5 and intrinsics 4/5  Absent DTRs upper and lower extremities  Negative Tinel's  Nonantalgic gait full weightbearing   Severe loss of balance with tandem gait    Lower extremity strength intact.   Straight leg raise negative  Neurovascularly intact distal lower extremities  No clonus  Visit Vitals  Pulse 96   Temp 96.8 °F (36 °C) (Tympanic)   Ht 4' 10\" (1.473 m)   Wt 153 lb 12.8 oz (69.8 kg)   SpO2 100% BMI 32.14 kg/m²         Past Medical History:   Diagnosis Date    Anxiety     Asthma     Bipolar 1 disorder (HCC)     Cancer (HCC)     bladder CA, cervical CA, skin CA    Eczema     Fibromyalgia     Hiatal hernia     History of bladder cancer     Microscopic hematuria     Nocturia     JOSE (obstructive sleep apnea)     didn't like CPAP    PE (pulmonary thromboembolism) ([facility] [address]     Pneumonia     TIA (transient ischemic attack)         Past Surgical History:   Procedure Laterality Date    HX  SECTION      HX HYSTERECTOMY      HX OTHER SURGICAL      skin CA lesions removed L calf, L thigh         Current Outpatient Medications   Medication Sig Dispense Refill    budesonide-formoteroL (Symbicort) 160-4.5 mcg/actuation HFAA Take  by inhalation.  cyclobenzaprine (FLEXERIL) 10 mg tablet       FLUoxetine (PROzac) 20 mg capsule TAKE 1 CAPSULE BY MOUTH EVERY DAY      lidocaine 4 % patch 1 Patch by TransDERmal route as needed.  ramelteon (ROZEREM) 8 mg tablet Take  by mouth nightly.  omalizumab (XOLAIR) 150 mg solr 375 mg by SubCUTAneous route every fourteen (14) days.  dupilumab (DUPIXENT) 300 mg/2 mL syrg syringe 300 mg by SubCUTAneous route every fourteen (14) days.  melatonin 10 mg tab Take  by mouth.  EPINEPHrine (EPIPEN) 0.3 mg/0.3 mL injection 0.3 mg by IntraMUSCular route once as needed.  LORazepam (ATIVAN) 1 mg tablet Take 1 Tab by mouth two (2) times a day. Max Daily Amount: 2 mg. 60 Tab 0    albuterol (PROVENTIL HFA, VENTOLIN HFA, PROAIR HFA) 90 mcg/actuation inhaler Take 1 Puff by inhalation. Ms. Da Huffman may have a reminder for a \"due or due soon\" health maintenance. I have asked that she contact her primary care provider for follow-up on this health maintenance. This note was created using Dragon transcription software and may contain unintended errors.

## 2021-06-17 NOTE — PROGRESS NOTES
Verbal order entered per Dr. Damon Charter as documented on blue sheet: 1. EMG RUE- right hand N/T/W  2.  Neurology referral- JOSE

## 2021-06-25 ENCOUNTER — OFFICE VISIT (OUTPATIENT)
Dept: ORTHOPEDIC SURGERY | Age: 51
End: 2021-06-25
Payer: MEDICARE

## 2021-06-25 VITALS
TEMPERATURE: 97.8 F | BODY MASS INDEX: 31.91 KG/M2 | HEIGHT: 58 IN | WEIGHT: 152 LBS | OXYGEN SATURATION: 95 % | SYSTOLIC BLOOD PRESSURE: 110 MMHG | HEART RATE: 93 BPM | DIASTOLIC BLOOD PRESSURE: 76 MMHG

## 2021-06-25 DIAGNOSIS — R20.0 NUMBNESS AND TINGLING IN RIGHT HAND: Primary | ICD-10-CM

## 2021-06-25 DIAGNOSIS — R20.2 NUMBNESS AND TINGLING IN RIGHT HAND: ICD-10-CM

## 2021-06-25 DIAGNOSIS — R20.2 NUMBNESS AND TINGLING IN RIGHT HAND: Primary | ICD-10-CM

## 2021-06-25 DIAGNOSIS — R94.131 ABNORMAL EMG: ICD-10-CM

## 2021-06-25 DIAGNOSIS — R20.0 NUMBNESS AND TINGLING IN RIGHT HAND: ICD-10-CM

## 2021-06-25 PROCEDURE — 95886 MUSC TEST DONE W/N TEST COMP: CPT | Performed by: PHYSICAL MEDICINE & REHABILITATION

## 2021-06-25 PROCEDURE — 95909 NRV CNDJ TST 5-6 STUDIES: CPT | Performed by: PHYSICAL MEDICINE & REHABILITATION

## 2021-06-25 NOTE — PROGRESS NOTES
Nader Piyush presents today for   Chief Complaint   Patient presents with    Procedure     RUE EMG       Is someone accompanying this pt? no    Is the patient using any DME equipment during OV? no    Depression Screening:  3 most recent PHQ Screens 5/2/2018   Little interest or pleasure in doing things Not at all   Feeling down, depressed, irritable, or hopeless Not at all   Total Score PHQ 2 0       Learning Assessment:  Learning Assessment 5/2/2018   PRIMARY LEARNER Patient   HIGHEST LEVEL OF EDUCATION - PRIMARY LEARNER  SOME [id number] [facility] CAREGIVER No   PRIMARY LANGUAGE ENGLISH   LEARNER PREFERENCE PRIMARY DEMONSTRATION   ANSWERED BY patient   RELATIONSHIP SELF       Abuse Screening:  Abuse Screening Questionnaire 5/2/2018   Do you ever feel afraid of your partner? N   Are you in a relationship with someone who physically or mentally threatens you? N   Is it safe for you to go home? Y       Fall Risk  Fall Risk Assessment, last 12 mths 5/2/2018   Able to walk? Yes   Fall in past 12 months? No       Coordination of Care:  1. Have you been to the ER, urgent care clinic since your last visit? no  Hospitalized since your last visit? no    2. Have you seen or consulted any other health care providers outside of the [address] [place] [address] since your last visit? no Include any pap smears or colon screening.  no

## 2021-06-25 NOTE — PROGRESS NOTES
[address] [address].  [address], [address]  [place], [address] [place] [address] Street  Phone: [phone]  Fax: [phone]        Allyn Middleton  : 1970  PCP: Aminta Pintoma  2021    ELECTROMYOGRAPHY AND NERVE CONDUCTION STUDIES    Afshan Chavis was referred by Dr. Abernathy Going for electrodiagnostic evaluation of right hand numbness and tingling. NCV & EMG Findings:    Evaluation of the right median sensory nerve showed prolonged distal peak latency (4.1 ms) and decreased conduction velocity (Wrist-2nd Digit, 34 m/s). All remaining nerves (as indicated in the following tables) were within normal limits. All examined muscles (as indicated in the following table) showed no evidence of electrical instability. INTERPRETATION  This is an abnormal electrodiagnostic examination. These findings may be consistent with:   1. Mild median mononeuropathy at the right wrist (carpal tunnel syndrome)    There is no electrodiagnostic evidence of any cervical radiculopathy, brachial plexopathy, peripheral polyneuropathy, or any other mononeuropathy. CLINICAL INTERPRETATION    Her electrodiagnostic finding of carpal tunnel syndrome is consistent with her right hand symptoms. HISTORY OF PRESENT ILLNESS  Joseluis Gómez is a 46 y.o. female. Pt presents today for RUE EMG evaluation of right hand numbness and tingling. She reports difficulty opening jars and using zippers. She has h/o fibromyalgia.     PAST MEDICAL HISTORY   Past Medical History:   Diagnosis Date    Anxiety     Asthma     Bipolar 1 disorder ([facility] [address]     Cancer (HCC)     bladder CA, cervical CA, skin CA    Eczema     Fibromyalgia     Hiatal hernia     History of bladder cancer     Microscopic hematuria     Nocturia     JOSE (obstructive sleep apnea)     didn't like CPAP    PE (pulmonary thromboembolism) (HCC)     Pneumonia     TIA (transient ischemic attack)        Past Surgical History:   Procedure Laterality Date    HX  SECTION      HX HYSTERECTOMY      HX OTHER SURGICAL      skin CA lesions removed L calf, L thigh   . MEDICATIONS    Current Outpatient Medications   Medication Sig Dispense Refill    budesonide-formoteroL (Symbicort) 160-4.5 mcg/actuation HFAA Take  by inhalation.  cyclobenzaprine (FLEXERIL) 10 mg tablet Take 10 mg by mouth three (3) times daily as needed.  FLUoxetine (PROzac) 20 mg capsule TAKE 1 CAPSULE BY MOUTH EVERY DAY      lidocaine 4 % patch 1 Patch by TransDERmal route as needed.  ramelteon (ROZEREM) 8 mg tablet Take  by mouth nightly.  omalizumab (XOLAIR) 150 mg solr 375 mg by SubCUTAneous route every fourteen (14) days.  dupilumab (DUPIXENT) 300 mg/2 mL syrg syringe 300 mg by SubCUTAneous route every fourteen (14) days.  melatonin 10 mg tab Take 1 Tablet by mouth nightly as needed.  EPINEPHrine (EPIPEN) 0.3 mg/0.3 mL injection 0.3 mg by IntraMUSCular route once as needed.  LORazepam (ATIVAN) 1 mg tablet Take 1 Tab by mouth two (2) times a day. Max Daily Amount: 2 mg. 60 Tab 0    albuterol (PROVENTIL HFA, VENTOLIN HFA, PROAIR HFA) 90 mcg/actuation inhaler Take 1 Puff by inhalation.           ALLERGIES  Allergies   Allergen Reactions    Pcn [Penicillins] Anaphylaxis    Strawberry Anaphylaxis    Sulfur Anaphylaxis    Adhesive Tape-Silicones Hives    Depakote [Divalproex] Other (comments)     Paralysis      Doxepin Other (comments)    Keflex [Cephalexin] Rash    Levaquin [Levofloxacin] Nausea and Vomiting    Seroquel [Quetiapine] Rash    Sulfa (Sulfonamide Antibiotics) Nausea and Vomiting    Thorazine [Chlorpromazine] Other (comments)     Not sure      Toradol [Ketorolac] Seizures    Tramadol Seizures          SOCIAL HISTORY    Social History     Socioeconomic History    Marital status:      Spouse name: Not on file    Number of children: Not on file    Years of education: Not on file    Highest education level: Not on file   Tobacco Use    Smoking status: Former Smoker     Packs/day: 0.25    Smokeless tobacco: Never Used   Substance and Sexual Activity    Alcohol use: No    Drug use: No     Social Determinants of Health     Financial Resource Strain:     Difficulty of Paying Living Expenses:    Food Insecurity:     Worried About Running Out of Food in the Last Year:     Ran Out of Food in the Last Year:    Transportation Needs:     Lack of Transportation (Medical):  Lack of Transportation (Non-Medical):    Physical Activity:     Days of Exercise per Week:     Minutes of Exercise per Session:    Stress:     Feeling of Stress :    Social Connections:     Frequency of Communication with Friends and Family:     Frequency of Social Gatherings with Friends and Family:     Attends Religion Services:     Active Member of Clubs or Organizations:     Attends Club or Organization Meetings:     Marital Status:        FAMILY HISTORY  Family History   Problem Relation Age of Onset    Thyroid Disease Mother     Diabetes Mother     Heart Attack Mother          PHYSICAL EXAMINATION  Visit Vitals  /76 (BP 1 Location: Right upper arm, BP Patient Position: Sitting, BP Cuff Size: Adult long)   Pulse 93   Temp 97.8 °F (36.6 °C) (Skin)   Ht 4' 10\" (1.473 m)   Wt 152 lb (68.9 kg)   SpO2 95% Comment: RA   BMI 31.77 kg/m²       Pain Assessment  6/25/2021   Location of Pain -   Location Modifiers -   Severity of Pain 0   Quality of Pain Other (Comment)   Quality of Pain Comment n/t to right hand   Duration of Pain Persistent   Frequency of Pain Intermittent   Aggravating Factors Other (Comment)   Aggravating Factors Comment just comes and goes when it wants   Limiting Behavior Some   Relieving Factors -   Relieving Factors Comment -   Result of Injury No           Constitutional:  Well developed, well nourished, in no acute distress. Psychiatric: Affect and mood are appropriate. Integumentary: No rashes or abrasions noted on exposed areas. SPINE/MUSCULOSKELETAL EXAM    On brief examination: None.       NCV & EMG Findings:  Nerve Conduction Studies  Anti Sensory Summary Table     Stim Site NR Peak (ms) Norm Peak (ms) O-P Amp (µV) Norm O-P Amp Site1 Site2 Delta-P (ms) Dist (cm) Arpan (m/s) Norm Arpan (m/s)   Right Median Anti Sensory (2nd Digit)   Wrist    4.1 <4 23.1 >13 Wrist 2nd Digit 4.1 14.0 34 >39   Right Radial Anti Sensory (Base 1st Digit)   Wrist    1.9 2.8 67.8 11 Wrist Base 1st Digit 1.9 10.0 53    Right Ulnar Anti Sensory (5th Digit)   Wrist    2.8 <4.0 13.7 >9 Wrist 5th Digit 2.8 14.0 50 >38     Motor Summary Table     Stim Site NR Onset (ms) Norm Onset (ms) O-P Amp (mV) Norm O-P Amp Site1 Site2 Delta-0 (ms) Dist (cm) Arpan (m/s) Norm Arpan (m/s)   Right Median Motor (Abd Poll Brev)   Wrist    4.1 <4.5 11.9 >4.1 Elbow Wrist 3.2 17.5 55 >49   Elbow    7.3  12.1          Right Ulnar Motor (Abd Dig Min)   Wrist    2.4 <3.7 10.2 >7.9 B Elbow Wrist 2.3 13.0 57 >52   B Elbow    4.7  9.3  A Elbow B Elbow 1.3 10.0 77 >43   A Elbow    6.0  8.3            EMG     Side Muscle Nerve Root Ins Act Fibs Psw Amp Dur Poly Recrt Int [place] Comment   Right Biceps Musculocut C5-6 Nml Nml Nml Nml Nml 0 Nml Nml    Right Triceps Radial C6-7-8 Nml Nml Nml Nml Nml 0 Nml Nml    Right PronatorTeres Median C6-7 Nml Nml Nml Nml Nml 0 Nml Nml    Right Abd Poll Brev Median C8-T1 Nml Nml Nml Nml Nml 0 Nml Nml    Right 1stDorInt Ulnar C8-T1 Nml Nml Nml Nml Nml 0 Nml Nml        Nerve Conduction Studies  Anti Sensory Left/Right Comparison     Stim Site L Lat (ms) R Lat (ms) L-R Lat (ms) L Amp (µV) R Amp (µV) L-R Amp (%) Site1 Site2 L Arpan (m/s) R Arpan (m/s) L-R Arpan (m/s)   Median Anti Sensory (2nd Digit)   Wrist  4.1   23.1  Wrist 2nd Digit  34    Radial Anti Sensory (Base 1st Digit)   Wrist  1.9   67.8  Wrist Base 1st Digit  53    Ulnar Anti Sensory (5th Digit)   Wrist  2.8   13.7  Wrist 5th Digit  50      Motor Left/Right Comparison     Stim Site L Lat (ms) R Lat (ms) L-R Lat (ms) L Amp (mV) R Amp (mV) L-R Amp (%) Site1 Site2 L Arpan (m/s) R Arpan (m/s) L-R Arpan (m/s)   Median Motor (Abd Poll Brev)   Wrist  4.1   11.9  Elbow Wrist  55    Elbow  7.3   12.1         Ulnar Motor (Abd Dig Min)   Wrist  2.4   10.2  B Elbow Wrist  57    B Elbow  4.7   9.3  A Elbow B Elbow  77    A Elbow  6.0   8.3               Waveforms:                     [facility]  OFFICE PROCEDURE PROGRESS NOTE        Chart reviewed for the following:   Clem CASTANEDA, have reviewed the History, Physical and updated the Allergic reactions for Afshan Ma     TIME OUT performed immediately prior to start of procedure:   Clem CASTANEDA, have performed the following reviews on Afshan Ma prior to the start of the procedure:            * Patient was identified by name and date of birth   * Agreement on procedure being performed was verified  * Risks and Benefits explained to the patient  * Procedure site verified and marked as necessary  * Patient was positioned for comfort  * Consent was signed and verified     Time: 12:04 PM    Date of procedure: 6/25/2021    Procedure performed by:  Le Saenz MD    Provider accompanied by: Faribaibpilar. Patient accompanied by: Self.     How tolerated by patient: tolerated the procedure well with no complications    Post Procedural Pain Scale: 0 - No Hurt    Comments: none    Written by Tamara Osler, Maile Spencer as dictated by Clem Yin MD

## 2021-06-25 NOTE — Clinical Note
6/25/2021    Patient: Stef Salvador   YOB: 1970   Date of Visit: 6/25/2021     Perla Soto, [address]  [address] [address]  Via Fax: [phone]    Dear DEXTER Mancuso,      Thank you for referring Ms. Afshan Ma to [address] [facility] for evaluation. My notes for this consultation are attached. If you have questions, please do not hesitate to call me. I look forward to following your patient along with you.       Sincerely,    Solange Hodge MD

## 2021-06-25 NOTE — Clinical Note
6/25/2021 3:17 PM    Ms.  [address] [facility] [address]              Sincerely,      Fernanda Parada MD

## 2021-06-25 NOTE — Clinical Note
NOTIFICATION RETURN TO WORK / SCHOOL    6/25/2021 3:17 PM    [address]. [address] [facility],[address]  [address]  [address]  [address]      To Whom It May Concern:    Asiya [facility],[address] is currently under the care of [facility] in [place] [facility]. She will return to work/school on: ***    If there are questions or concerns please have the patient contact our office.         Sincerely,      Fernanda Parada MD

## 2021-07-22 ENCOUNTER — OFFICE VISIT (OUTPATIENT)
Dept: ORTHOPEDIC SURGERY | Age: 51
End: 2021-07-22
Payer: MEDICARE

## 2021-07-22 VITALS
HEART RATE: 99 BPM | HEIGHT: 58 IN | BODY MASS INDEX: 32.5 KG/M2 | TEMPERATURE: 97.5 F | RESPIRATION RATE: 20 BRPM | OXYGEN SATURATION: 96 % | WEIGHT: 154.8 LBS

## 2021-07-22 DIAGNOSIS — M79.7 FIBROMYALGIA: ICD-10-CM

## 2021-07-22 DIAGNOSIS — G56.01 CARPAL TUNNEL SYNDROME OF RIGHT WRIST: Primary | ICD-10-CM

## 2021-07-22 DIAGNOSIS — G47.8 NON-RESTORATIVE SLEEP: ICD-10-CM

## 2021-07-22 PROCEDURE — G8510 SCR DEP NEG, NO PLAN REQD: HCPCS | Performed by: PHYSICAL MEDICINE & REHABILITATION

## 2021-07-22 PROCEDURE — G8417 CALC BMI ABV UP PARAM F/U: HCPCS | Performed by: PHYSICAL MEDICINE & REHABILITATION

## 2021-07-22 PROCEDURE — 3017F COLORECTAL CA SCREEN DOC REV: CPT | Performed by: PHYSICAL MEDICINE & REHABILITATION

## 2021-07-22 PROCEDURE — G8427 DOCREV CUR MEDS BY ELIG CLIN: HCPCS | Performed by: PHYSICAL MEDICINE & REHABILITATION

## 2021-07-22 PROCEDURE — G9899 SCRN MAM PERF RSLTS DOC: HCPCS | Performed by: PHYSICAL MEDICINE & REHABILITATION

## 2021-07-22 PROCEDURE — 99213 OFFICE O/P EST LOW 20 MIN: CPT | Performed by: PHYSICAL MEDICINE & REHABILITATION

## 2021-07-22 NOTE — PROGRESS NOTES
1. Have you been to an emergency room or urgent care clinic since your last visit? No     Hospitalized since your last visit? If yes, where, when, and reason for visit? No     2. Have you seen or consulted any other health care providers outside of the [facility] since your last visit including any procedures, health maintenance items. If yes, where, when and reason for visit?  Yes; pcp            3 most recent PHQ Screens 7/22/2021   Little interest or pleasure in doing things Not at all   Feeling down, depressed, irritable, or hopeless Several days   Total Score PHQ 2 1

## 2021-07-22 NOTE — PROGRESS NOTES
[address]  [address], [address]  [place] [address]  Phone: [phone]  Fax: [phone]      Tim Pacheco  : 1970  PCP: DEXTER Barnard    PROGRESS NOTE    Diagnoses and all orders for this visit:    1. Carpal tunnel syndrome of right wrist  -     REFERRAL TO HAND SURGERY    2. Fibromyalgia    3. Non-restorative sleep  -     REFERRAL TO NEUROLOGY         1. Rene Kauffman is a 46 y.o. female RHD w/chronic right CTS. Failed PT/OT, meds, night splints. 2. Refer to hand sx for symptomatic CTS w/weakness  3. Check on status of neuro eval  4. Activity as tolerated. Follow-up and Dispositions    · Return if symptoms worsen or fail to improve. Pain Assessment  2021   Location of Pain Hand;Elbow   Location Modifiers -   Severity of Pain 6   Quality of Pain Throbbing   Quality of Pain Comment -   Duration of Pain Persistent   Frequency of Pain Constant   Aggravating Factors Other (Comment)   Aggravating Factors Comment picking things up   Limiting Behavior Some   Relieving Factors Nothing   Relieving Factors Comment -   Result of Injury No       History of Present Illness:  Rene Kauffman is a  46 y.o.  female  EDX f/u.  + mild CTS on right, no radiculopathy  Using night splint x3 weeks, no benefit  Weakness, can't hold pasta pot.       Physical Exam:  Pleasant cooperative lady in no acute distress  Negative Martina sign  Positive Tinel's at the right wrist  She has pain with right wrist flexion  No wrist pain with extension  Negative CMC grind  Mild ABP atrophy noted on right      Visit Vitals  Pulse 99   Temp 97.5 °F (36.4 °C) (Temporal)   Resp 20   Ht 4' 10\" (1.473 m)   Wt 154 lb 12.8 oz (70.2 kg)   SpO2 96%   BMI 32.35 kg/m²       Pertinent Spine Hx     EMG/NCV 2021 mild right CTS, no radiculopathy  2021 C XR mild degenerative changes C5/6/7, minimal degen changes at TL jx  Spine surgery consult: NA  Mom had spine sx? thoracic     Treatments:  Physical therapy: 3 rounds , short term benefit. Doing HEP, yoga  Spinal injections: TPI yrs ago, helped 3 days max  Spinal surgery- no  Beneficial medications: Percocet, Flexeril- helps a little  Failed medications: Zanaflex (too sedated), Prednisone,Gabapentin (no benefit, 100# weight gain), Lyrica (edema), Cymbalta ( couldn't see), Savella (lethagic), APAP, Ibu, Lidoderm patch, Trazadone.     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  Pertinent PMHx:  FM x 15 years, dx by rheum +ANAI , migraines, cervical CA, multiple PEs (doesn't want to be on anti-coagulants, now on ASA, last about 10 years ago), TIA, bipolar, hx miscarriage. RHD. Walks 4 miles/day  In PM 8-10 years ago in [employer], [place].     Poor sleep maximum 4 hours even with the use of melatonin, Ativan, Flexeril and Rozerem (melatonin agonist)            We have informed Farhan Diehl to notify us for immediate appointment if she has any worsening neurogical symptoms or if an emergency situation presents, then call 911. Unintended errors may be present due to dragon medical dictation software.

## 2021-08-12 ENCOUNTER — OFFICE VISIT (OUTPATIENT)
Dept: ORTHOPEDIC SURGERY | Age: 51
End: 2021-08-12
Payer: MEDICARE

## 2021-08-12 VITALS
OXYGEN SATURATION: 98 % | HEIGHT: 58 IN | BODY MASS INDEX: 31.99 KG/M2 | TEMPERATURE: 97.4 F | HEART RATE: 97 BPM | WEIGHT: 152.4 LBS

## 2021-08-12 DIAGNOSIS — G56.01 CARPAL TUNNEL SYNDROME OF RIGHT WRIST: Primary | ICD-10-CM

## 2021-08-12 DIAGNOSIS — M79.7 FIBROMYALGIA: ICD-10-CM

## 2021-08-12 PROCEDURE — 3017F COLORECTAL CA SCREEN DOC REV: CPT | Performed by: ORTHOPAEDIC SURGERY

## 2021-08-12 PROCEDURE — G9899 SCRN MAM PERF RSLTS DOC: HCPCS | Performed by: ORTHOPAEDIC SURGERY

## 2021-08-12 PROCEDURE — G8432 DEP SCR NOT DOC, RNG: HCPCS | Performed by: ORTHOPAEDIC SURGERY

## 2021-08-12 PROCEDURE — G8417 CALC BMI ABV UP PARAM F/U: HCPCS | Performed by: ORTHOPAEDIC SURGERY

## 2021-08-12 PROCEDURE — 99215 OFFICE O/P EST HI 40 MIN: CPT | Performed by: ORTHOPAEDIC SURGERY

## 2021-08-12 PROCEDURE — G8427 DOCREV CUR MEDS BY ELIG CLIN: HCPCS | Performed by: ORTHOPAEDIC SURGERY

## 2021-08-12 RX ORDER — IBUPROFEN 800 MG/1
800 TABLET ORAL 2 TIMES DAILY
COMMUNITY
End: 2021-09-21

## 2021-08-12 NOTE — PROGRESS NOTES
Memo Fakl is a 46 y.o. female right handed individual, not currently working. Worker's Compensation and legal considerations: not known. Vitals:    21 1239   Pulse: 97   Temp: 97.4 °F (36.3 °C)   TempSrc: Skin   SpO2: 98%   Weight: 152 lb 6.4 oz (69.1 kg)   Height: 4' 10\" (1.473 m)   PainSc:   6   PainLoc: Wrist           Chief Complaint   Patient presents with    Wrist Pain     right, possible carpal tunnel         HPI: Patient presents with + EMG for carpal tunnel on right. She has a history of fibromyalgia. She is requesting to just have it \"fixed\" and does not thing a steroid injection will work.     Date of onset:  indeterminate    Injury: No    Prior Treatment:  No    Numbness/ Tingling: Yes: Comment: Right hand      ROS: Review of Systems - General ROS: negative  Psychological ROS: negative  ENT ROS: negative  Allergy and Immunology ROS: negative  Hematological and Lymphatic ROS: negative  Respiratory ROS: no cough, shortness of breath, or wheezing  Cardiovascular ROS: no chest pain or dyspnea on exertion  Gastrointestinal ROS: no abdominal pain, change in bowel habits, or black or bloody stools  Musculoskeletal ROS: negative  Neurological ROS: positive for - numbness/tingling  Dermatological ROS: negative    Past Medical History:   Diagnosis Date    Anxiety     Asthma     Bipolar 1 disorder (HCC)     Cancer (HCC)     bladder CA, cervical CA, skin CA    CTS (carpal tunnel syndrome), right, mild by EMG/NCV 2021    Eczema     Fibromyalgia     Hiatal hernia     History of bladder cancer     Microscopic hematuria     Nocturia     JOSE (obstructive sleep apnea)     didn't like CPAP    PE (pulmonary thromboembolism) (HCC)     Pneumonia     Right wrist pain     TIA (transient ischemic attack)        Past Surgical History:   Procedure Laterality Date    HX  SECTION      HX HYSTERECTOMY      HX OTHER SURGICAL      skin CA lesions removed L calf, L thigh Current Outpatient Medications   Medication Sig Dispense Refill    ibuprofen (MOTRIN) 800 mg tablet Take 800 mg by mouth two (2) times a day.  budesonide-formoteroL (Symbicort) 160-4.5 mcg/actuation HFAA Take  by inhalation.  cyclobenzaprine (FLEXERIL) 10 mg tablet Take 10 mg by mouth three (3) times daily as needed.  FLUoxetine (PROzac) 20 mg capsule TAKE 1 CAPSULE BY MOUTH EVERY DAY      lidocaine 4 % patch 1 Patch by TransDERmal route as needed.  ramelteon (ROZEREM) 8 mg tablet Take  by mouth nightly.  omalizumab (XOLAIR) 150 mg solr 375 mg by SubCUTAneous route every fourteen (14) days.  dupilumab (DUPIXENT) 300 mg/2 mL syrg syringe 300 mg by SubCUTAneous route every fourteen (14) days.  melatonin 10 mg tab Take 1 Tablet by mouth nightly as needed.  EPINEPHrine (EPIPEN) 0.3 mg/0.3 mL injection 0.3 mg by IntraMUSCular route once as needed.  LORazepam (ATIVAN) 1 mg tablet Take 1 Tab by mouth two (2) times a day. Max Daily Amount: 2 mg. 60 Tab 0    albuterol (PROVENTIL HFA, VENTOLIN HFA, PROAIR HFA) 90 mcg/actuation inhaler Take 1 Puff by inhalation. Allergies   Allergen Reactions    Pcn [Penicillins] Anaphylaxis    Strawberry Anaphylaxis    Sulfur Anaphylaxis    Adhesive Tape-Silicones Hives    Depakote [Divalproex] Other (comments)     Paralysis      Doxepin Other (comments)    Keflex [Cephalexin] Rash    Levaquin [Levofloxacin] Nausea and Vomiting    Seroquel [Quetiapine] Rash    Sulfa (Sulfonamide Antibiotics) Nausea and Vomiting    Thorazine [Chlorpromazine] Other (comments)     Not sure      Toradol [Ketorolac] Seizures    Tramadol Seizures           PE:     Physical Exam  Vitals and nursing note reviewed. Constitutional:       General: She is not in acute distress. Appearance: Normal appearance. She is not ill-appearing. Cardiovascular:      Pulses: Normal pulses.    Pulmonary:      Effort: Pulmonary effort is normal. No respiratory distress. Musculoskeletal:         General: Tenderness present. No swelling, deformity or signs of injury. Normal range of motion. Cervical back: Normal range of motion and neck supple. Right lower leg: No edema. Left lower leg: No edema. Skin:     General: Skin is warm and dry. Capillary Refill: Capillary refill takes less than 2 seconds. Findings: No bruising or erythema. Neurological:      General: No focal deficit present. Mental Status: She is alert and oriented to person, place, and time. Psychiatric:         Mood and Affect: Mood is anxious. Affect is tearful. Behavior: Behavior normal.            NEUROVASCULAR    Examination L R Examination L R   Carpal Comp. - + Pronator Comp. - -   Carpal Tinel - + Pronator Tinel - -   Phalen's - + Pronator Stress - -   Cubital Comp. - - Guyon Comp. - -   Cubital Tinel - - Guyon Tinel - -   Elbow Hyperflexion - - Adson's - -   Spurling's - - SC Comp. - -   PCB Median abn - - SC Tinel - -   Radial Tinel - - IC Comp. - -   Digital Tinel - - IC Tinel - -   Radial 2-Pt WNL WNL Ulnar 2-Pt WNL WNL     Radial Pulse: 2+  Capillary Refill: < 2 sec  F[employer]: Not Performed  [employer]: Not Performed        NCV & EMG Findings:     Evaluation of the right median sensory nerve showed prolonged distal peak latency (4.1 ms) and decreased conduction velocity (Wrist-2nd Digit, 34 m/s). All remaining nerves (as indicated in the following tables) were within normal limits.       All examined muscles (as indicated in the following table) showed no evidence of electrical instability.       INTERPRETATION  This is an abnormal electrodiagnostic examination. These findings may be consistent with:   1.  Mild median mononeuropathy at the right wrist (carpal tunnel syndrome)     There is no electrodiagnostic evidence of any cervical radiculopathy, brachial plexopathy, peripheral polyneuropathy, or any other mononeuropathy.     CLINICAL INTERPRETATION     Her electrodiagnostic finding of carpal tunnel syndrome is consistent with her right hand symptoms. Imaging:     None indicated        ICD-10-CM ICD-9-CM    1. Carpal tunnel syndrome of right wrist  G56.01 354.0    2. Fibromyalgia  M79.7 729.1          Plan:     Right Carpal Tunnel Injections    40 minutes was spent with the patient discussing operative versus nonoperative treatment extensively and the reason for proceeding with nonoperative treatment first.    Follow-up and Dispositions    · Return in about 1 month (around 9/12/2021) for Reevaluation and injection follow-up and possible surgical discussion. .          Plan was reviewed with patient, who verbalized agreement and understanding of the plan    [facility] NOTE        Chart reviewed for the following:   Eric CASTANEDA DO, have reviewed the History, Physical and updated the Allergic reactions for Afshan Ma     TIME OUT performed immediately prior to start of procedure:   Eric CASTANEDA DO, have performed the following reviews on Afshan Ma prior to the start of the procedure:            * Patient was identified by name and date of birth   * Agreement on procedure being performed was verified  * Risks and Benefits explained to the patient  * Procedure site verified and marked as necessary  * Patient was positioned for comfort  * Consent was signed and verified     Time: 13:30      Date of procedure: 8/12/2021    Procedure performed by: Destinee Haddad DO    Provider assisted by: Jennifer Henderson LPN    Patient assisted by: self    How tolerated by patient: tolerated the procedure well with no complications    Post Procedural Pain Scale: 0 - No Hurt    Comments: none    Procedure:  After consent was obtained, using sterile technique the right carpal tunnel was prepped. Local anesthetic used: 1% lidocaine.  Kenalog 5 mg and was then injected and the needle withdrawn. The procedure was well tolerated. The patient is asked to continue to rest the area for a few more days before resuming regular activities. It may be more painful for the first 1-2 days. Watch for fever, or increased swelling or persistent pain in the joint. Call or return to clinic prn if such symptoms occur or there is failure to improve as anticipated.

## 2021-08-15 ENCOUNTER — APPOINTMENT (OUTPATIENT)
Dept: GENERAL RADIOLOGY | Age: 51
End: 2021-08-15
Attending: EMERGENCY MEDICINE
Payer: MEDICARE

## 2021-08-15 ENCOUNTER — HOSPITAL ENCOUNTER (EMERGENCY)
Age: 51
Discharge: HOME | End: 2021-08-15
Attending: STUDENT IN AN ORGANIZED HEALTH CARE EDUCATION/TRAINING PROGRAM
Payer: MEDICARE

## 2021-08-15 VITALS
SYSTOLIC BLOOD PRESSURE: 115 MMHG | HEIGHT: 58 IN | WEIGHT: 155 LBS | DIASTOLIC BLOOD PRESSURE: 76 MMHG | TEMPERATURE: 98.1 F | HEART RATE: 101 BPM | BODY MASS INDEX: 32.54 KG/M2 | RESPIRATION RATE: 17 BRPM | OXYGEN SATURATION: 97 %

## 2021-08-15 DIAGNOSIS — K52.9 GASTROENTERITIS: Primary | ICD-10-CM

## 2021-08-15 LAB
ALBUMIN SERPL-MCNC: 3.9 G/DL (ref 3.4–5)
ALBUMIN/GLOB SERPL: 0.9 {RATIO} (ref 0.8–1.7)
ALP SERPL-CCNC: 129 U/L (ref 45–117)
ALT SERPL-CCNC: 47 U/L (ref 13–56)
ANION GAP SERPL CALC-SCNC: 4 MMOL/L (ref 3–18)
AST SERPL-CCNC: 21 U/L (ref 10–38)
ATRIAL RATE: 101 BPM
BASOPHILS # BLD: 0 K/UL (ref 0–0.1)
BASOPHILS NFR BLD: 0 % (ref 0–2)
BILIRUB SERPL-MCNC: 0.6 MG/DL (ref 0.2–1)
BUN SERPL-MCNC: 14 MG/DL (ref 7–18)
BUN/CREAT SERPL: 19 (ref 12–20)
CALCIUM SERPL-MCNC: 8.6 MG/DL (ref 8.5–10.1)
CALCULATED P AXIS, ECG09: 58 DEGREES
CALCULATED R AXIS, ECG10: 15 DEGREES
CALCULATED T AXIS, ECG11: 27 DEGREES
CHLORIDE SERPL-SCNC: 108 MMOL/L (ref 100–111)
CO2 SERPL-SCNC: 25 MMOL/L (ref 21–32)
CREAT SERPL-MCNC: 0.75 MG/DL (ref 0.6–1.3)
DIAGNOSIS, 93000: NORMAL
DIFFERENTIAL METHOD BLD: ABNORMAL
EOSINOPHIL # BLD: 0.7 K/UL (ref 0–0.4)
EOSINOPHIL NFR BLD: 6 % (ref 0–5)
ERYTHROCYTE [DISTWIDTH] IN BLOOD BY AUTOMATED COUNT: 14.1 % (ref 11.6–14.5)
GLOBULIN SER CALC-MCNC: 4.2 G/DL (ref 2–4)
GLUCOSE SERPL-MCNC: 94 MG/DL (ref 74–99)
HCT VFR BLD AUTO: 43.1 % (ref 35–45)
HGB BLD-MCNC: 13.5 G/DL (ref 12–16)
LIPASE SERPL-CCNC: 125 U/L (ref 73–393)
LYMPHOCYTES # BLD: 2.6 K/UL (ref 0.9–3.6)
LYMPHOCYTES NFR BLD: 23 % (ref 21–52)
MCH RBC QN AUTO: 26.2 PG (ref 24–34)
MCHC RBC AUTO-ENTMCNC: 31.3 G/DL (ref 31–37)
MCV RBC AUTO: 83.7 FL (ref 74–97)
MONOCYTES # BLD: 0.7 K/UL (ref 0.05–1.2)
MONOCYTES NFR BLD: 6 % (ref 3–10)
NEUTS SEG # BLD: 7.3 K/UL (ref 1.8–8)
NEUTS SEG NFR BLD: 64 % (ref 40–73)
P-R INTERVAL, ECG05: 130 MS
PLATELET # BLD AUTO: 425 K/UL (ref 135–420)
PMV BLD AUTO: 9.3 FL (ref 9.2–11.8)
POTASSIUM SERPL-SCNC: 3.7 MMOL/L (ref 3.5–5.5)
PROT SERPL-MCNC: 8.1 G/DL (ref 6.4–8.2)
Q-T INTERVAL, ECG07: 338 MS
QRS DURATION, ECG06: 76 MS
QTC CALCULATION (BEZET), ECG08: 438 MS
RBC # BLD AUTO: 5.15 M/UL (ref 4.2–5.3)
SODIUM SERPL-SCNC: 137 MMOL/L (ref 136–145)
TROPONIN I SERPL-MCNC: <0.02 NG/ML (ref 0–0.04)
VENTRICULAR RATE, ECG03: 101 BPM
WBC # BLD AUTO: 11.4 K/UL (ref 4.6–13.2)

## 2021-08-15 PROCEDURE — 93005 ELECTROCARDIOGRAM TRACING: CPT

## 2021-08-15 PROCEDURE — 96374 THER/PROPH/DIAG INJ IV PUSH: CPT

## 2021-08-15 PROCEDURE — 84484 ASSAY OF TROPONIN QUANT: CPT

## 2021-08-15 PROCEDURE — 80053 COMPREHEN METABOLIC PANEL: CPT

## 2021-08-15 PROCEDURE — 71046 X-RAY EXAM CHEST 2 VIEWS: CPT

## 2021-08-15 PROCEDURE — 74011250636 HC RX REV CODE- 250/636: Performed by: STUDENT IN AN ORGANIZED HEALTH CARE EDUCATION/TRAINING PROGRAM

## 2021-08-15 PROCEDURE — 83690 ASSAY OF LIPASE: CPT

## 2021-08-15 PROCEDURE — 99284 EMERGENCY DEPT VISIT MOD MDM: CPT

## 2021-08-15 PROCEDURE — 85025 COMPLETE CBC W/AUTO DIFF WBC: CPT

## 2021-08-15 RX ORDER — ONDANSETRON 4 MG/1
4 TABLET, ORALLY DISINTEGRATING ORAL
Qty: 20 TABLET | Refills: 0 | Status: SHIPPED | OUTPATIENT
Start: 2021-08-15 | End: 2021-09-20

## 2021-08-15 RX ORDER — FAMOTIDINE 20 MG/1
20 TABLET, FILM COATED ORAL DAILY
Qty: 30 TABLET | Refills: 0 | Status: SHIPPED | OUTPATIENT
Start: 2021-08-15 | End: 2021-09-20

## 2021-08-15 RX ORDER — ONDANSETRON 2 MG/ML
4 INJECTION INTRAMUSCULAR; INTRAVENOUS
Status: COMPLETED | OUTPATIENT
Start: 2021-08-15 | End: 2021-08-15

## 2021-08-15 RX ADMIN — SODIUM CHLORIDE 1000 ML: 900 INJECTION, SOLUTION INTRAVENOUS at 08:15

## 2021-08-15 RX ADMIN — ONDANSETRON 4 MG: 2 INJECTION INTRAMUSCULAR; INTRAVENOUS at 08:15

## 2021-08-15 NOTE — ED PROVIDER NOTES
Patient is a 49-year-old female who presents with vomiting for the past 3 days. She states she vomits approximately 5-10 times per day. She is not sure what brings it on. She has had a poor appetite since she has not really been eating or drinking but when she does it does not necessarily work. She did eat some days beforehand. No one else around her has been sick. She denies any associated abdominal pain but does feel like she has some \"tightness \"after retching. She is chronically constipated but is at her baseline. She denies any dysuria, hematuria or increased urinary frequency. She states that today she noticed that the vomit became coffee-ground. She does have some associated chest pain that started after the vomiting occurred. She denies any shortness of breath.   She did attempt to take some Zofran which helped but she \"does not like taking medications \"           Past Medical History:   Diagnosis Date    Anxiety     Asthma     B[facility] disorder ([facility]     Cancer ([facility]     bladder CA, cervical CA, skin CA    CTS (carpal tunnel syndrome), right, mild by EMG/NCV 2021    Eczema     Fibromyalgia     Hiatal hernia     History of bladder cancer     Microscopic hematuria     Nocturia     JOSE (obstructive sleep apnea)     didn't like CPAP    PE (pulmonary thromboembolism) (HCC)     Pneumonia     Right wrist pain     TIA (transient ischemic attack)        Past Surgical History:   Procedure Laterality Date    HX  SECTION      HX HYSTERECTOMY      HX OTHER SURGICAL      skin CA lesions removed L calf, L thigh         Family History:   Problem Relation Age of Onset    Thyroid Disease Mother     Diabetes Mother     Heart Attack Mother        Social History     Socioeconomic History    Marital status:      Spouse name: Not on file    Number of children: Not on file    Years of education: Not on file    Highest education level: Not on file   Occupational History    Not on file   Tobacco Use    Smoking status: Former Smoker     Packs/day: 0.25    Smokeless tobacco: Never Used   Substance and Sexual Activity    Alcohol use: No    Drug use: No    Sexual activity: Not on file   Other Topics Concern    Not on file   Social History Narrative    Not on file     Social Determinants of Health     Financial Resource Strain:     Difficulty of Paying Living Expenses:    Food Insecurity:     Worried About Running Out of Food in the Last Year:     [address] in the Last Year:    Transportation Needs:     Lack of Transportation (Medical):  Lack of Transportation (Non-Medical):    Physical Activity:     Days of Exercise per Week:     Minutes of Exercise per Session:    Stress:     Feeling of Stress :    Social Connections:     Frequency of Communication with Friends and Family:     Frequency of Social Gatherings with Friends and Family:     Attends Jainism Services:     Active Member of Clubs or Organizations:     Attends Club or Organization Meetings:     Marital Status:    Intimate Partner Violence:     Fear of Current or Ex-Partner:     Emotionally Abused:     Physically Abused:     Sexually Abused:           ALLERGIES: Pcn [penicillins], Strawberry, Sulfur, Adhesive tape-silicones, Depakote [divalproex], Doxepin, Keflex [cephalexin], Levaquin [levofloxacin], Seroquel [quetiapine], Sulfa (sulfonamide antibiotics), Thorazine [chlorpromazine], Toradol [ketorolac], and Tramadol    Review of Systems  Constitutional: [no fever]  HENT: [no ear pain]  Eyes: [no change in vision]  Respiratory: [no SOB]  Cardio: Positive for chest pain  GI: [no blood in stool]  : [no hematuria]  MSK: [no back pain]  Skin: [no rashes]  Neuro: [no headache]    Vitals:    08/15/21 0351 08/15/21 0356   BP:  115/76   Pulse:  (!) 101   Resp:  17   Temp:  98.1 °F (36.7 °C)   SpO2:  97%   Weight: 70.3 kg (155 lb) 70.3 kg (155 lb)   Height:  4' 10\" (1.473 m) Physical Exam   General: [No acute distress]  Head: [Normocephalic, atraumatic]  Psych: [cooperative and alert]  Eyes: [No scleral icterus, normal conjunctiva]  ENT: [moist oral mucosa]  Neck: [supple]  CV: [regular rate and rhythm, no pitting edema, palpable radial pulses]  Pulm: [clear breath sounds bilaterally without any wheezing or rhonchi, normal respiratory rate]  GI: [normal bowel sounds, soft, non-tender], no CVA tenderness  MSK: [moves all four extremities]  Skin: [no rashes]  Neuro: [alert and conversive]     MDM   Patient is a 77-year-old female who presents with vomiting and chest pain for 3 days. Patient has a benign abdominal exam, no signs of obstruction and otherwise appears well however is having significant vomiting so we will obtain some blood work to evaluate for abdominal etiologies. I would also like to rule out ACS and she has chest pain although I suspect that this is more likely from a Pinky-Frye tear from recurrent retching versus esophageal irritation. EKG performed at 354 shows a sinus tachycardic rhythm with a narrow QRS. There is no significant ST elevations or depressions. Normal axis. R wave progression across precordium is satisfactory. Overall normal EKG with no signs of ACS or arrhythmia. Chest x-ray shows no acute cardiopulmonary process. No signs of pneumomediastinum or free air under the diaphragm. CBC, CMP, lipase and troponin are within normal limits. Upon reexamination patient states that she is feeling better. She is not had any episodes of vomiting while here. She continues to have a benign abdominal exam.    Patient stable for discharge at this time. Patient is in agreement with the plan to be discharged at this time. All the patient's questions were answered. Patient was given written instructions on the diagnosis, and states understanding of the plan moving forward.   We did discuss important signs and symptoms that should prompt quick return to the emergency department. Disposition: Patient was discharged home in stable condition.   They will follow up with her primary care physician    Prescriptions: Zofran and famotidine    Diagnosis: Acute gastritis    Procedures

## 2021-08-15 NOTE — Clinical Note
[facility]  [facility] EMERGENCY DEPT  [id number] [address] [phone]    Work/School Note    Date: 8/15/2021    To Whom It May concern:      Jaime Oquendo was seen and treated today in the emergency room by the following provider(s):  Attending Provider: Destini Reagan MD.      Jaime Oquendo is excused from work/school on 08/15/21. She is clear to return to work/school on 08/16/21.         Sincerely,          Giovany Cxo MD

## 2021-09-03 ENCOUNTER — OFFICE VISIT (OUTPATIENT)
Dept: ORTHOPEDIC SURGERY | Age: 51
End: 2021-09-03
Payer: COMMERCIAL

## 2021-09-03 VITALS
HEIGHT: 58 IN | HEART RATE: 117 BPM | OXYGEN SATURATION: 97 % | BODY MASS INDEX: 32.54 KG/M2 | RESPIRATION RATE: 16 BRPM | WEIGHT: 155 LBS

## 2021-09-03 DIAGNOSIS — G56.01 CARPAL TUNNEL SYNDROME OF RIGHT WRIST: Primary | ICD-10-CM

## 2021-09-03 DIAGNOSIS — M79.7 FIBROMYALGIA: ICD-10-CM

## 2021-09-03 PROCEDURE — G8427 DOCREV CUR MEDS BY ELIG CLIN: HCPCS | Performed by: ORTHOPAEDIC SURGERY

## 2021-09-03 PROCEDURE — G9899 SCRN MAM PERF RSLTS DOC: HCPCS | Performed by: ORTHOPAEDIC SURGERY

## 2021-09-03 PROCEDURE — G8432 DEP SCR NOT DOC, RNG: HCPCS | Performed by: ORTHOPAEDIC SURGERY

## 2021-09-03 PROCEDURE — 99214 OFFICE O/P EST MOD 30 MIN: CPT | Performed by: ORTHOPAEDIC SURGERY

## 2021-09-03 PROCEDURE — G8417 CALC BMI ABV UP PARAM F/U: HCPCS | Performed by: ORTHOPAEDIC SURGERY

## 2021-09-03 PROCEDURE — 3017F COLORECTAL CA SCREEN DOC REV: CPT | Performed by: ORTHOPAEDIC SURGERY

## 2021-09-03 NOTE — PROGRESS NOTES
Eliane Barksdale is a 46 y.o. female right handed individual, not currently working. Worker's Compensation and legal considerations: not known. Vitals:    09/03/21 1449   Pulse: (!) 117   Resp: 16   SpO2: 97%   Weight: 155 lb (70.3 kg)   Height: 4' 10\" (1.473 m)   PainSc:   6   PainLoc: Wrist           Chief Complaint   Patient presents with    Follow-up     right CT injections    Wrist Pain     right       HPI: Patient returns today for follow-up after receiving right carpal tunnel injection. She reports it working for about a week and then wearing off and the numbness coming back. Initial HPI: Patient presents with + EMG for carpal tunnel on right. She has a history of fibromyalgia. She is requesting to just have it \"fixed\" and does not thing a steroid injection will work.     Date of onset:  indeterminate    Injury: No    Prior Treatment:  Yes: Comment: Right carpal tunnel injection and brace    Numbness/ Tingling: Yes: Comment: Right hand      ROS: Review of Systems - General ROS: negative  Psychological ROS: negative  ENT ROS: negative  Allergy and Immunology ROS: negative  Hematological and Lymphatic ROS: negative  Respiratory ROS: no cough, shortness of breath, or wheezing  Cardiovascular ROS: no chest pain or dyspnea on exertion  Gastrointestinal ROS: no abdominal pain, change in bowel habits, or black or bloody stools  Musculoskeletal ROS: negative  Neurological ROS: positive for - numbness/tingling  Dermatological ROS: negative    Past Medical History:   Diagnosis Date    Anxiety     Asthma     Bipolar 1 disorder (HCC)     Cancer (HCC)     bladder CA, cervical CA, skin CA    CTS (carpal tunnel syndrome), right, mild by EMG/NCV 06/2021    Eczema     Fibromyalgia     Hiatal hernia     History of bladder cancer     Microscopic hematuria     Nocturia     JOSE (obstructive sleep apnea)     didn't like CPAP    [facility]     Pneumonia     Right wrist pain  TIA (transient ischemic attack)        Past Surgical History:   Procedure Laterality Date    HX  SECTION      HX HYSTERECTOMY      HX OTHER SURGICAL      skin CA lesions removed L calf, L thigh       Current Outpatient Medications   Medication Sig Dispense Refill    ondansetron (Zofran ODT) 4 mg disintegrating tablet 1 Tablet by SubLINGual route every four (4) hours as needed for Nausea or Vomiting. 20 Tablet 0    famotidine (PEPCID) 20 mg tablet Take 1 Tablet by mouth daily. 30 Tablet 0    ibuprofen (MOTRIN) 800 mg tablet Take 800 mg by mouth two (2) times a day.  budesonide-formoteroL (Symbicort) 160-4.5 mcg/actuation HFAA Take  by inhalation.  cyclobenzaprine (FLEXERIL) 10 mg tablet Take 10 mg by mouth three (3) times daily as needed.  FLUoxetine (PROzac) 20 mg capsule TAKE 1 CAPSULE BY MOUTH EVERY DAY      lidocaine 4 % patch 1 Patch by TransDERmal route as needed.  ramelteon (ROZEREM) 8 mg tablet Take  by mouth nightly.  omalizumab (XOLAIR) 150 mg solr 375 mg by SubCUTAneous route every fourteen (14) days.  dupilumab (DUPIXENT) 300 mg/2 mL syrg syringe 300 mg by SubCUTAneous route every fourteen (14) days.  melatonin 10 mg tab Take 1 Tablet by mouth nightly as needed.  EPINEPHrine (EPIPEN) 0.3 mg/0.3 mL injection 0.3 mg by IntraMUSCular route once as needed.  LORazepam (ATIVAN) 1 mg tablet Take 1 Tab by mouth two (2) times a day. Max Daily Amount: 2 mg. 60 Tab 0    albuterol (PROVENTIL HFA, VENTOLIN HFA, PROAIR HFA) 90 mcg/actuation inhaler Take 1 Puff by inhalation.          Allergies   Allergen Reactions    Pcn [Penicillins] Anaphylaxis    Strawberry Anaphylaxis    Sulfur Anaphylaxis    Adhesive Tape-Silicones Hives    Depakote [Divalproex] Other (comments)     Paralysis      Doxepin Other (comments)    Keflex [Cephalexin] Rash    Levaquin [Levofloxacin] Nausea and Vomiting    Seroquel [Quetiapine] Rash    Sulfa (Sulfonamide Antibiotics) Nausea and Vomiting    Thorazine [Chlorpromazine] Other (comments)     Not sure      Toradol [Ketorolac] Seizures    Tramadol Seizures           PE:     Physical Exam  Vitals and nursing note reviewed. Constitutional:       General: She is not in acute distress. Appearance: Normal appearance. She is not ill-appearing, toxic-appearing or diaphoretic. HENT:      Head: Normocephalic and atraumatic. Nose: Nose normal.      Mouth/Throat:      Mouth: Mucous membranes are moist.   Eyes:      Extraocular Movements: Extraocular movements intact. Pupils: Pupils are equal, round, and reactive to light. Cardiovascular:      Pulses: Normal pulses. Pulmonary:      Effort: Pulmonary effort is normal. No respiratory distress. Abdominal:      General: Abdomen is flat. There is no distension. Musculoskeletal:         General: Tenderness present. No swelling, deformity or signs of injury. Normal range of motion. Cervical back: Normal range of motion and neck supple. Right lower leg: No edema. Left lower leg: No edema. Skin:     General: Skin is warm and dry. Capillary Refill: Capillary refill takes less than 2 seconds. Findings: No bruising or erythema. Neurological:      General: No focal deficit present. Mental Status: She is alert and oriented to person, place, and time. Cranial Nerves: No cranial nerve deficit. Sensory: No sensory deficit. Psychiatric:         Mood and Affect: Mood normal.         Behavior: Behavior normal.            NEUROVASCULAR    Examination L R Examination L R   Carpal Comp. - + Pronator Comp. - -   Carpal Tinel - + Pronator Tinel - -   Phalen's - + Pronator Stress - -   Cubital Comp. - - Guyon Comp. - -   Cubital Tinel - - Guyon Tinel - -   Elbow Hyperflexion - - Adson's - -   Spurling's - - SC Comp. - -   PCB Median abn - - SC Tinel - -   Radial Tinel - - IC Comp.  - -   Digital Tinel - - IC Tinel - -   Radial 2-Pt WNL WNL Ulnar 2-Pt WNL WNL     Radial Pulse: 2+  Capillary Refill: < 2 sec  [employer]: Not Performed  [employer]: Not Performed        NCV & EMG Findings:     Evaluation of the right median sensory nerve showed prolonged distal peak latency (4.1 ms) and decreased conduction velocity (Wrist-2nd Digit, 34 m/s). All remaining nerves (as indicated in the following tables) were within normal limits.       All examined muscles (as indicated in the following table) showed no evidence of electrical instability.       INTERPRETATION  This is an abnormal electrodiagnostic examination. These findings may be consistent with:   1. Mild median mononeuropathy at the right wrist (carpal tunnel syndrome)     There is no electrodiagnostic evidence of any cervical radiculopathy, brachial plexopathy, peripheral polyneuropathy, or any other mononeuropathy.     CLINICAL INTERPRETATION     Her electrodiagnostic finding of carpal tunnel syndrome is consistent with her right hand symptoms. Imaging:     None indicated        ICD-10-CM ICD-9-CM    1. Carpal tunnel syndrome of right wrist  G56.01 354.0 SCHEDULE SURGERY      REFERRAL TO OCCUPATIONAL THERAPY      CANCELED: REFERRAL TO OCCUPATIONAL THERAPY   2. Fibromyalgia  M79.7 729.1 REFERRAL TO OCCUPATIONAL THERAPY      CANCELED: REFERRAL TO OCCUPATIONAL THERAPY         Plan:     Schedule right carpal tunnel release. The patient was counseled at length about the risks of oscar Covid-19 during their perioperative period and any recovery window from their procedure. The patient was made aware that oscar Covid-19  may worsen their prognosis for recovering from their procedure and lend to a higher morbidity and/or mortality risk. All material risks, benefits, and reasonable alternatives including postponing the procedure were discussed. The patient does  wish to proceed with the procedure at this time.       This procedure has been fully reviewed with the patient and written informed consent has been obtained. Follow-up and Dispositions    · Return for 2 weeks postop.    Follow-up and Disposition History           Plan was reviewed with patient, who verbalized agreement and understanding of the plan

## 2021-09-07 ENCOUNTER — TELEPHONE (OUTPATIENT)
Dept: ORTHOPEDIC SURGERY | Age: 51
End: 2021-09-07

## 2021-09-07 NOTE — TELEPHONE ENCOUNTER
[facility] called stating they scheduled the patient to be seen on 09/28/21 but she informed them her first post-op appointment with Dr. Sendy Vivar isn't until 10/01/21. Louie Crowley concerned about her starting physical therapy before her post-op appointment because if she has dressing on, etc then they won't be able to do anything with her. They're asking if they should wait to see her until after her post-op appointment. Please advise [facility] back regarding this at [phone].

## 2021-09-09 DIAGNOSIS — Z01.818 PREOP EXAMINATION: Primary | ICD-10-CM

## 2021-09-16 ENCOUNTER — HOSPITAL ENCOUNTER (OUTPATIENT)
Dept: PREADMISSION TESTING | Age: 51
Discharge: HOME | End: 2021-09-16
Payer: COMMERCIAL

## 2021-09-16 DIAGNOSIS — Z01.818 PREOP EXAMINATION: ICD-10-CM

## 2021-09-16 LAB
ALBUMIN SERPL-MCNC: 3.5 G/DL (ref 3.4–5)
ALBUMIN/GLOB SERPL: 1 {RATIO} (ref 0.8–1.7)
ALP SERPL-CCNC: 102 U/L (ref 45–117)
ALT SERPL-CCNC: 45 U/L (ref 13–56)
ANION GAP SERPL CALC-SCNC: 4 MMOL/L (ref 3–18)
AST SERPL-CCNC: 28 U/L (ref 10–38)
BASOPHILS # BLD: 0 K/UL (ref 0–0.1)
BASOPHILS NFR BLD: 1 % (ref 0–2)
BILIRUB SERPL-MCNC: 0.6 MG/DL (ref 0.2–1)
BUN SERPL-MCNC: 10 MG/DL (ref 7–18)
BUN/CREAT SERPL: 14 (ref 12–20)
CALCIUM SERPL-MCNC: 8.7 MG/DL (ref 8.5–10.1)
CHLORIDE SERPL-SCNC: 107 MMOL/L (ref 100–111)
CO2 SERPL-SCNC: 31 MMOL/L (ref 21–32)
CREAT SERPL-MCNC: 0.7 MG/DL (ref 0.6–1.3)
DIFFERENTIAL METHOD BLD: ABNORMAL
EOSINOPHIL # BLD: 0.3 K/UL (ref 0–0.4)
EOSINOPHIL NFR BLD: 5 % (ref 0–5)
ERYTHROCYTE [DISTWIDTH] IN BLOOD BY AUTOMATED COUNT: 14.7 % (ref 11.6–14.5)
GLOBULIN SER CALC-MCNC: 3.4 G/DL (ref 2–4)
GLUCOSE SERPL-MCNC: 87 MG/DL (ref 74–99)
HCT VFR BLD AUTO: 39.5 % (ref 35–45)
HGB BLD-MCNC: 12.2 G/DL (ref 12–16)
LYMPHOCYTES # BLD: 1.8 K/UL (ref 0.9–3.6)
LYMPHOCYTES NFR BLD: 33 % (ref 21–52)
MCH RBC QN AUTO: 25.8 PG (ref 24–34)
MCHC RBC AUTO-ENTMCNC: 30.9 G/DL (ref 31–37)
MCV RBC AUTO: 83.7 FL (ref 78–100)
MONOCYTES # BLD: 0.6 K/UL (ref 0.05–1.2)
MONOCYTES NFR BLD: 12 % (ref 3–10)
NEUTS SEG # BLD: 2.6 K/UL (ref 1.8–8)
NEUTS SEG NFR BLD: 49 % (ref 40–73)
PLATELET # BLD AUTO: 345 K/UL (ref 135–420)
PMV BLD AUTO: 9.8 FL (ref 9.2–11.8)
POTASSIUM SERPL-SCNC: 3.9 MMOL/L (ref 3.5–5.5)
PROT SERPL-MCNC: 6.9 G/DL (ref 6.4–8.2)
RBC # BLD AUTO: 4.72 M/UL (ref 4.2–5.3)
SODIUM SERPL-SCNC: 142 MMOL/L (ref 136–145)
WBC # BLD AUTO: 5.3 K/UL (ref 4.6–13.2)

## 2021-09-16 PROCEDURE — 36415 COLL VENOUS BLD VENIPUNCTURE: CPT

## 2021-09-16 PROCEDURE — 85025 COMPLETE CBC W/AUTO DIFF WBC: CPT

## 2021-09-16 PROCEDURE — 80053 COMPREHEN METABOLIC PANEL: CPT

## 2021-09-16 PROCEDURE — U0003 INFECTIOUS AGENT DETECTION BY NUCLEIC ACID (DNA OR RNA); SEVERE ACUTE RESPIRATORY SYNDROME CORONAVIRUS 2 (SARS-COV-2) (CORONAVIRUS DISEASE [COVID-19]), AMPLIFIED PROBE TECHNIQUE, MAKING USE OF HIGH THROUGHPUT TECHNOLOGIES AS DESCRIBED BY CMS-2020-01-R: HCPCS

## 2021-09-18 LAB — SARS-COV-2, COV2NT: NOT DETECTED

## 2021-09-20 ENCOUNTER — ANESTHESIA EVENT (OUTPATIENT)
Dept: SURGERY | Age: 51
End: 2021-09-20
Payer: COMMERCIAL

## 2021-09-20 PROBLEM — G56.01 RIGHT CARPAL TUNNEL SYNDROME: Status: ACTIVE | Noted: 2021-09-20

## 2021-09-20 RX ORDER — ACETAMINOPHEN 500 MG
1000 TABLET ORAL
COMMUNITY
End: 2021-09-21

## 2021-09-20 RX ORDER — FLUTICASONE PROPIONATE AND SALMETEROL 250; 50 UG/1; UG/1
1 POWDER RESPIRATORY (INHALATION) EVERY 12 HOURS
COMMUNITY

## 2021-09-20 RX ORDER — DIPHENHYDRAMINE HCL 25 MG
25 TABLET ORAL
COMMUNITY

## 2021-09-20 RX ORDER — CALCIUM CARBONATE 200(500)MG
2 TABLET,CHEWABLE ORAL
COMMUNITY

## 2021-09-20 RX ORDER — FLUTICASONE PROPIONATE 50 MCG
2 SPRAY, SUSPENSION (ML) NASAL
COMMUNITY

## 2021-09-20 NOTE — PERIOP NOTES
PRE-SURGICAL INSTRUCTIONS        Patient's Name:  Yobani Nuñez Date:  9/20/2021            Covid Testing Date and Time: Done - Negative    Surgery Date:  9/21/2021                1. Do NOT eat or drink anything, including candy, gum, or ice chips after midnight on 9/20, unless you have specific instructions from your surgeon or anesthesia provider to do so.  2. You may brush your teeth before coming to the hospital.  3. No smoking 24 hours prior to the day of surgery. 4. No alcohol 24 hours prior to the day of surgery. 5. No recreational drugs for one week prior to the day of surgery. 6. Leave all valuables, including money/purse, at home. 7. Remove all jewelry, nail polish, acrylic nails, and makeup (including mascara); no lotions powders, deodorant, or perfume/cologne/after shave on the skin. 8. Follow instruction for Hibiclens washes and CHG wipes from surgeon's office. 9. Glasses/contact lenses and dentures may be worn to the hospital.  They will be removed prior to surgery. 10. Call your doctor if symptoms of a cold or illness develop within 24-48 hours prior to your surgery. 11.  If you are having an outpatient procedure, please make arrangements for a responsible ADULT TO [address] [place] [address] and stay with you for 24 hours after your surgery. 12. ONE VISITOR in the hospital at this time for outpatient procedures. Exceptions may be made for surgical admissions, per nursing unit guidelines      Special Instructions:        Bring inhaler. Bring any pertinent legal medical records. Take these medications the morning of surgery with a sip of water:  May take Ativan if needed. Use Advair. Follow physician instructions about stopping anticoagulants. Stopped Advil 2 weeks ago. On the day of surgery, come in the main entrance of [facility]. Let the  at the desk know you are there for surgery.   A staff member will come escort you to the surgical area on the second floor. If you have any questions or concerns, please do not hesitate to call:     (Prior to the day of surgery) [facility] department:  [phone]   (Day of surgery) Pre-Op department:  [phone]    These surgical instructions were reviewed with Miriam Carlson during the [facility] phone call.

## 2021-09-20 NOTE — H&P
Marlyn Singletary is a 46 y.o. female right handed individual, not currently working. Worker's Compensation and legal considerations: not known.         Vitals:     09/03/21 1449   Pulse: (!) 117   Resp: 16   SpO2: 97%   Weight: 155 lb (70.3 kg)   Height: 4' 10\" (1.473 m)   PainSc:   6   PainLoc: Wrist                    Chief Complaint   Patient presents with    Follow-up       right CT injections    Wrist Pain       right         HPI: Patient returns today for follow-up after receiving right carpal tunnel injection. She reports it working for about a week and then wearing off and the numbness coming back.     Initial HPI: Patient presents with + EMG for carpal tunnel on right. She has a history of fibromyalgia.   She is requesting to just have it \"fixed\" and does not thing a steroid injection will work.     Date of onset:  indeterminate     Injury: No     Prior Treatment:  Yes: Comment: Right carpal tunnel injection and brace     Numbness/ Tingling: Yes: Comment: Right hand        ROS: Review of Systems - General ROS: negative  Psychological ROS: negative  ENT ROS: negative  Allergy and Immunology ROS: negative  Hematological and Lymphatic ROS: negative  Respiratory ROS: no cough, shortness of breath, or wheezing  Cardiovascular ROS: no chest pain or dyspnea on exertion  Gastrointestinal ROS: no abdominal pain, change in bowel habits, or black or bloody stools  Musculoskeletal ROS: negative  Neurological ROS: positive for - numbness/tingling  Dermatological ROS: negative          Past Medical History:   Diagnosis Date    Anxiety      Asthma      Bipolar 1 disorder (HCC)      Cancer (HCC)       bladder CA, cervical CA, skin CA    CTS (carpal tunnel syndrome), right, mild by EMG/NCV 06/2021    Eczema      Fibromyalgia      Hiatal hernia      History of bladder cancer      Microscopic hematuria      Nocturia      JOSE (obstructive sleep apnea)       didn't like CPAP    PE (pulmonary thromboembolism) ([address]      Pneumonia      Right wrist pain      TIA (transient ischemic attack)           Surgical History         Past Surgical History:   Procedure Laterality Date    HX  SECTION       HX HYSTERECTOMY       HX OTHER SURGICAL         skin CA lesions removed L calf, L thigh                   Current Outpatient Medications   Medication Sig Dispense Refill    ondansetron (Zofran ODT) 4 mg disintegrating tablet 1 Tablet by SubLINGual route every four (4) hours as needed for Nausea or Vomiting. 20 Tablet 0    famotidine (PEPCID) 20 mg tablet Take 1 Tablet by mouth daily. 30 Tablet 0    ibuprofen (MOTRIN) 800 mg tablet Take 800 mg by mouth two (2) times a day.        budesonide-formoteroL (Symbicort) 160-4.5 mcg/actuation HFAA Take  by inhalation.        cyclobenzaprine (FLEXERIL) 10 mg tablet Take 10 mg by mouth three (3) times daily as needed.        FLUoxetine (PROzac) 20 mg capsule TAKE 1 CAPSULE BY MOUTH EVERY DAY        lidocaine 4 % patch 1 Patch by TransDERmal route as needed.        ramelteon (ROZEREM) 8 mg tablet Take  by mouth nightly.        omalizumab (XOLAIR) 150 mg solr 375 mg by SubCUTAneous route every fourteen (14) days.        dupilumab (DUPIXENT) 300 mg/2 mL syrg syringe 300 mg by SubCUTAneous route every fourteen (14) days.        melatonin 10 mg tab Take 1 Tablet by mouth nightly as needed.        EPINEPHrine (EPIPEN) 0.3 mg/0.3 mL injection 0.3 mg by IntraMUSCular route once as needed.        LORazepam (ATIVAN) 1 mg tablet Take 1 Tab by mouth two (2) times a day.  Max Daily Amount: 2 mg. 60 Tab 0    albuterol (PROVENTIL HFA, VENTOLIN HFA, PROAIR HFA) 90 mcg/actuation inhaler Take 1 Puff by inhalation.                   Allergies   Allergen Reactions    Pcn [Penicillins] Anaphylaxis    Strawberry Anaphylaxis    Sulfur Anaphylaxis    Adhesive Tape-Silicones Hives    Depakote [Divalproex] Other (comments)       Paralysis       Doxepin Other (comments)    Keflex [Cephalexin] Rash    Levaquin [Levofloxacin] Nausea and Vomiting    Seroquel [Quetiapine] Rash    Sulfa (Sulfonamide Antibiotics) Nausea and Vomiting    Thorazine [Chlorpromazine] Other (comments)       Not sure       Toradol [Ketorolac] Seizures    Tramadol Seizures               PE:      Physical Exam  Vitals and nursing note reviewed. Constitutional:       General: She is not in acute distress. Appearance: Normal appearance. She is not ill-appearing, toxic-appearing or diaphoretic. HENT:      Head: Normocephalic and atraumatic. Nose: Nose normal.      Mouth/Throat:      Mouth: Mucous membranes are moist.   Eyes:      Extraocular Movements: Extraocular movements intact. Pupils: Pupils are equal, round, and reactive to light. Cardiovascular:      Pulses: Normal pulses. Pulmonary:      Effort: Pulmonary effort is normal. No respiratory distress. Abdominal:      General: Abdomen is flat. There is no distension. Musculoskeletal:         General: Tenderness present. No swelling, deformity or signs of injury. Normal range of motion. Cervical back: Normal range of motion and neck supple. Right lower leg: No edema. Left lower leg: No edema. Skin:     General: Skin is warm and dry. Capillary Refill: Capillary refill takes less than 2 seconds. Findings: No bruising or erythema. Neurological:      General: No focal deficit present. Mental Status: She is alert and oriented to person, place, and time. Cranial Nerves: No cranial nerve deficit. Sensory: No sensory deficit. Psychiatric:         Mood and Affect: Mood normal.         Behavior: Behavior normal.               NEUROVASCULAR     Examination L R Examination L R   Carpal Comp. - + Pronator Comp. - -   Carpal Tinel - + Pronator Tinel - -   Phalen's - + Pronator Stress - -   Cubital Comp. - - Guyon Comp.  - -   Cubital Tinel - - Guyon Tinel - -   Elbow Hyperflexion - - Adson's - - Spurling's - - SC Comp. - -   PCB Median abn - - SC Tinel - -   Radial Tinel - - IC Comp. - -   Digital Tinel - - IC Tinel - -   Radial 2-Pt WNL WNL Ulnar 2-Pt WNL WNL      Radial Pulse: 2+  Capillary Refill: < 2 sec  Fidencio: Not Performed  Digital Fidencio: Not Performed           NCV & EMG Findings:     Evaluation of the right median sensory nerve showed prolonged distal peak latency (4.1 ms) and decreased conduction velocity (Wrist-2nd Digit, 34 m/s).  All remaining nerves (as indicated in the following tables) were within normal limits.       All examined muscles (as indicated in the following table) showed no evidence of electrical instability.       INTERPRETATION  This is an abnormal electrodiagnostic examination. These findings may be consistent with:   1. Mild median mononeuropathy at the right wrist (carpal tunnel syndrome)     There is no electrodiagnostic evidence of any cervical radiculopathy, brachial plexopathy, peripheral polyneuropathy, or any other mononeuropathy.     CLINICAL INTERPRETATION     Her electrodiagnostic finding of carpal tunnel syndrome is consistent with her right hand symptoms.            Imaging:      None indicated            ICD-10-CM ICD-9-CM     1.  Carpal tunnel syndrome of right wrist  G56.01 354.0 SCHEDULE SURGERY         REFERRAL TO OCCUPATIONAL THERAPY         CANCELED: REFERRAL TO OCCUPATIONAL THERAPY   2. Fibromyalgia  M79.7 729.1 REFERRAL TO OCCUPATIONAL THERAPY         CANCELED: REFERRAL TO OCCUPATIONAL THERAPY            Plan:      Schedule right carpal tunnel release.     The patient was counseled at length about the risks of oscar Covid-19 during their perioperative period and any recovery window from their procedure.  The patient was made aware that oscar Covid-19  may worsen their prognosis for recovering from their procedure and lend to a higher morbidity and/or mortality risk.  All material risks, benefits, and reasonable alternatives including postponing the procedure were discussed.  The patient does  wish to proceed with the procedure at this time.        This procedure has been fully reviewed with the patient and written informed consent has been obtained.              Follow-up and Dispositions    · Return for 2 weeks postop.    Follow-up and Disposition History             Plan was reviewed with patient, who verbalized agreement and understanding of the plan

## 2021-09-21 ENCOUNTER — HOSPITAL ENCOUNTER (OUTPATIENT)
Age: 51
Setting detail: OUTPATIENT SURGERY
Discharge: HOME | End: 2021-09-21
Attending: ORTHOPAEDIC SURGERY | Admitting: ORTHOPAEDIC SURGERY
Payer: COMMERCIAL

## 2021-09-21 ENCOUNTER — ANESTHESIA (OUTPATIENT)
Dept: SURGERY | Age: 51
End: 2021-09-21
Payer: COMMERCIAL

## 2021-09-21 VITALS
DIASTOLIC BLOOD PRESSURE: 78 MMHG | OXYGEN SATURATION: 97 % | BODY MASS INDEX: 32.5 KG/M2 | RESPIRATION RATE: 20 BRPM | SYSTOLIC BLOOD PRESSURE: 121 MMHG | WEIGHT: 154.8 LBS | HEART RATE: 96 BPM | TEMPERATURE: 97.5 F | HEIGHT: 58 IN

## 2021-09-21 DIAGNOSIS — G56.01 RIGHT CARPAL TUNNEL SYNDROME: ICD-10-CM

## 2021-09-21 DIAGNOSIS — Z98.890 S/P CARPAL TUNNEL RELEASE: Primary | ICD-10-CM

## 2021-09-21 PROCEDURE — 74011250637 HC RX REV CODE- 250/637: Performed by: ORTHOPAEDIC SURGERY

## 2021-09-21 PROCEDURE — 77030040356 HC CORD BPLR FRCP COVD -A: Performed by: ORTHOPAEDIC SURGERY

## 2021-09-21 PROCEDURE — 77030000032 HC CUF TRNQT ZIMM -B: Performed by: ORTHOPAEDIC SURGERY

## 2021-09-21 PROCEDURE — 77030002888 HC SUT CHRMC J&J -A: Performed by: ORTHOPAEDIC SURGERY

## 2021-09-21 PROCEDURE — 74011000250 HC RX REV CODE- 250: Performed by: ORTHOPAEDIC SURGERY

## 2021-09-21 PROCEDURE — 64721 CARPAL TUNNEL SURGERY: CPT | Performed by: ORTHOPAEDIC SURGERY

## 2021-09-21 PROCEDURE — 77030040361 HC SLV COMPR DVT MDII -B: Performed by: ORTHOPAEDIC SURGERY

## 2021-09-21 PROCEDURE — 76210000063 HC OR PH I REC FIRST 0.5 HR: Performed by: ORTHOPAEDIC SURGERY

## 2021-09-21 PROCEDURE — 76210000021 HC REC RM PH II 0.5 TO 1 HR: Performed by: ORTHOPAEDIC SURGERY

## 2021-09-21 PROCEDURE — 74011250636 HC RX REV CODE- 250/636: Performed by: NURSE ANESTHETIST, CERTIFIED REGISTERED

## 2021-09-21 PROCEDURE — 2709999900 HC NON-CHARGEABLE SUPPLY: Performed by: ORTHOPAEDIC SURGERY

## 2021-09-21 PROCEDURE — 76060000032 HC ANESTHESIA 0.5 TO 1 HR: Performed by: ORTHOPAEDIC SURGERY

## 2021-09-21 PROCEDURE — 74011250637 HC RX REV CODE- 250/637: Performed by: NURSE ANESTHETIST, CERTIFIED REGISTERED

## 2021-09-21 PROCEDURE — 76010000138 HC OR TIME 0.5 TO 1 HR: Performed by: ORTHOPAEDIC SURGERY

## 2021-09-21 PROCEDURE — 77030010813: Performed by: ORTHOPAEDIC SURGERY

## 2021-09-21 PROCEDURE — 74011000258 HC RX REV CODE- 258: Performed by: ORTHOPAEDIC SURGERY

## 2021-09-21 PROCEDURE — 01810 ANES PX NRV MUSC F/ARM WRST: CPT | Performed by: ANESTHESIOLOGY

## 2021-09-21 PROCEDURE — 74011000250 HC RX REV CODE- 250: Performed by: NURSE ANESTHETIST, CERTIFIED REGISTERED

## 2021-09-21 PROCEDURE — 77030006689 HC BLD OPHTH BVR BD -A: Performed by: ORTHOPAEDIC SURGERY

## 2021-09-21 PROCEDURE — 77030040922 HC BLNKT HYPOTHRM STRY -A: Performed by: ORTHOPAEDIC SURGERY

## 2021-09-21 RX ORDER — BUPIVACAINE HYDROCHLORIDE 2.5 MG/ML
INJECTION, SOLUTION EPIDURAL; INFILTRATION; INTRACAUDAL AS NEEDED
Status: DISCONTINUED | OUTPATIENT
Start: 2021-09-21 | End: 2021-09-21 | Stop reason: HOSPADM

## 2021-09-21 RX ORDER — LIDOCAINE HYDROCHLORIDE 10 MG/ML
0.1 INJECTION, SOLUTION EPIDURAL; INFILTRATION; INTRACAUDAL; PERINEURAL AS NEEDED
Status: DISCONTINUED | OUTPATIENT
Start: 2021-09-21 | End: 2021-09-21 | Stop reason: HOSPADM

## 2021-09-21 RX ORDER — PROPOFOL 10 MG/ML
VIAL (ML) INTRAVENOUS
Status: DISCONTINUED | OUTPATIENT
Start: 2021-09-21 | End: 2021-09-21 | Stop reason: HOSPADM

## 2021-09-21 RX ORDER — ONDANSETRON 2 MG/ML
INJECTION INTRAMUSCULAR; INTRAVENOUS AS NEEDED
Status: DISCONTINUED | OUTPATIENT
Start: 2021-09-21 | End: 2021-09-21 | Stop reason: HOSPADM

## 2021-09-21 RX ORDER — FENTANYL CITRATE 50 UG/ML
50 INJECTION, SOLUTION INTRAMUSCULAR; INTRAVENOUS
Status: CANCELLED | OUTPATIENT
Start: 2021-09-21

## 2021-09-21 RX ORDER — SODIUM CHLORIDE, SODIUM LACTATE, POTASSIUM CHLORIDE, CALCIUM CHLORIDE 600; 310; 30; 20 MG/100ML; MG/100ML; MG/100ML; MG/100ML
50 INJECTION, SOLUTION INTRAVENOUS CONTINUOUS
Status: DISCONTINUED | OUTPATIENT
Start: 2021-09-21 | End: 2021-09-21 | Stop reason: HOSPADM

## 2021-09-21 RX ORDER — OXYCODONE AND ACETAMINOPHEN 5; 325 MG/1; MG/1
1 TABLET ORAL ONCE
Status: COMPLETED | OUTPATIENT
Start: 2021-09-21 | End: 2021-09-21

## 2021-09-21 RX ORDER — SODIUM CHLORIDE 0.9 % (FLUSH) 0.9 %
5-40 SYRINGE (ML) INJECTION EVERY 8 HOURS
Status: CANCELLED | OUTPATIENT
Start: 2021-09-21

## 2021-09-21 RX ORDER — ONDANSETRON 2 MG/ML
4 INJECTION INTRAMUSCULAR; INTRAVENOUS ONCE
Status: CANCELLED | OUTPATIENT
Start: 2021-09-21 | End: 2021-09-21

## 2021-09-21 RX ORDER — FENTANYL CITRATE 50 UG/ML
INJECTION, SOLUTION INTRAMUSCULAR; INTRAVENOUS AS NEEDED
Status: DISCONTINUED | OUTPATIENT
Start: 2021-09-21 | End: 2021-09-21 | Stop reason: HOSPADM

## 2021-09-21 RX ORDER — FENTANYL CITRATE 50 UG/ML
25 INJECTION, SOLUTION INTRAMUSCULAR; INTRAVENOUS AS NEEDED
Status: CANCELLED | OUTPATIENT
Start: 2021-09-21

## 2021-09-21 RX ORDER — MAGNESIUM SULFATE 1 G/100ML
INJECTION INTRAVENOUS AS NEEDED
Status: DISCONTINUED | OUTPATIENT
Start: 2021-09-21 | End: 2021-09-21 | Stop reason: HOSPADM

## 2021-09-21 RX ORDER — SODIUM CHLORIDE 0.9 % (FLUSH) 0.9 %
5-40 SYRINGE (ML) INJECTION AS NEEDED
Status: CANCELLED | OUTPATIENT
Start: 2021-09-21

## 2021-09-21 RX ORDER — SODIUM CHLORIDE 0.9 % (FLUSH) 0.9 %
5-40 SYRINGE (ML) INJECTION EVERY 8 HOURS
Status: DISCONTINUED | OUTPATIENT
Start: 2021-09-21 | End: 2021-09-21 | Stop reason: HOSPADM

## 2021-09-21 RX ORDER — FAMOTIDINE 20 MG/1
20 TABLET, FILM COATED ORAL ONCE
Status: COMPLETED | OUTPATIENT
Start: 2021-09-21 | End: 2021-09-21

## 2021-09-21 RX ORDER — LIDOCAINE HYDROCHLORIDE 20 MG/ML
INJECTION, SOLUTION EPIDURAL; INFILTRATION; INTRACAUDAL; PERINEURAL AS NEEDED
Status: DISCONTINUED | OUTPATIENT
Start: 2021-09-21 | End: 2021-09-21 | Stop reason: HOSPADM

## 2021-09-21 RX ORDER — OXYCODONE AND ACETAMINOPHEN 7.5; 325 MG/1; MG/1
1 TABLET ORAL
Qty: 20 TABLET | Refills: 0 | Status: SHIPPED | OUTPATIENT
Start: 2021-09-21 | End: 2021-09-26

## 2021-09-21 RX ORDER — DICLOFENAC SODIUM 75 MG/1
75 TABLET, DELAYED RELEASE ORAL 2 TIMES DAILY WITH MEALS
Qty: 20 TABLET | Refills: 0 | Status: SHIPPED | OUTPATIENT
Start: 2021-09-21 | End: 2021-10-01 | Stop reason: SDUPTHER

## 2021-09-21 RX ORDER — PROPOFOL 10 MG/ML
INJECTION, EMULSION INTRAVENOUS AS NEEDED
Status: DISCONTINUED | OUTPATIENT
Start: 2021-09-21 | End: 2021-09-21 | Stop reason: HOSPADM

## 2021-09-21 RX ORDER — SODIUM CHLORIDE 0.9 % (FLUSH) 0.9 %
5-40 SYRINGE (ML) INJECTION AS NEEDED
Status: DISCONTINUED | OUTPATIENT
Start: 2021-09-21 | End: 2021-09-21 | Stop reason: HOSPADM

## 2021-09-21 RX ADMIN — FAMOTIDINE 20 MG: 20 TABLET ORAL at 10:51

## 2021-09-21 RX ADMIN — PROPOFOL 60 MG: 10 INJECTION, EMULSION INTRAVENOUS at 11:14

## 2021-09-21 RX ADMIN — LIDOCAINE HYDROCHLORIDE 100 MG: 20 INJECTION, SOLUTION EPIDURAL; INFILTRATION; INTRACAUDAL; PERINEURAL at 11:14

## 2021-09-21 RX ADMIN — PROPOFOL 100 MCG/KG/MIN: 10 INJECTION, EMULSION INTRAVENOUS at 11:14

## 2021-09-21 RX ADMIN — CLINDAMYCIN 900 MG: 150 INJECTION, SOLUTION INTRAMUSCULAR; INTRAVENOUS at 11:16

## 2021-09-21 RX ADMIN — FENTANYL CITRATE 50 MCG: 50 INJECTION, SOLUTION INTRAMUSCULAR; INTRAVENOUS at 11:14

## 2021-09-21 RX ADMIN — FENTANYL CITRATE 50 MCG: 50 INJECTION, SOLUTION INTRAMUSCULAR; INTRAVENOUS at 11:20

## 2021-09-21 RX ADMIN — OXYCODONE HYDROCHLORIDE AND ACETAMINOPHEN 1 TABLET: 5; 325 TABLET ORAL at 12:41

## 2021-09-21 RX ADMIN — ONDANSETRON 4 MG: 2 INJECTION INTRAMUSCULAR; INTRAVENOUS at 11:14

## 2021-09-21 RX ADMIN — MAGNESIUM SULFATE IN DEXTROSE 1 G: 10 INJECTION, SOLUTION INTRAVENOUS at 11:14

## 2021-09-21 RX ADMIN — SODIUM CHLORIDE, SODIUM LACTATE, POTASSIUM CHLORIDE, AND CALCIUM CHLORIDE 50 ML/HR: 600; 310; 30; 20 INJECTION, SOLUTION INTRAVENOUS at 10:51

## 2021-09-21 NOTE — DISCHARGE INSTRUCTIONS
Ice and Elevate operative wound/dressing. Begin moving fingers immediately after surgery. Keep dressing clean and dry. Cover for showering. Do not remove dressing. Patient Education        Carpal Tunnel Release: What to Expect at [facility] tunnel reduces the pressure on a nerve in the wrist. Your doctor cut a ligament that presses on the nerve. This lets the nerve pass freely through the tunnel without being squeezed. Your hand will hurt and may feel weak with some numbness. This usually goes away in a few days, but it may take several months. Your doctor may remove the large bandage, or he or she will tell you when and how to remove it yourself. In some cases, you may have a splint. If you have one, you will wear it for about 2 weeks. Your doctor will take out your stitches in 1 to 2 weeks. Your hand and wrist may feel worse than they used to feel. But the pain should start to go away. It usually takes 3 to 4 months to recover and up to 1 year before hand strength returns. How much strength returns will vary. The timing of your return to work depends on the type of surgery you had, whether the surgery was on your dominant hand (the hand you use most), and your work activities. If you had open surgery on your dominant hand and you do repeated actions at work, you may be able to go back to work in 10 to 8 weeks. Repeated motions include typing or assembly-line work. If the surgery was on the other hand and you don't do repeated actions at work, you may be able to return to work in 9 to 14 days. If you had endoscopic surgery, you may be able to go back to work sooner than with open surgery. This care sheet gives you a general idea about how long it will take for you to recover. But each person recovers at a different pace. Follow the steps below to get better as quickly as possible. How can you care for yourself at home? Activity    · Rest when you feel tired.  Getting enough sleep will help you recover.     · Try to walk each day. Start by walking a little more than you did the day before. Bit by bit, increase the amount you walk.     · For up to 2 weeks after surgery, avoid lifting things heavier than 1 to 2 pounds and using your hand. This includes doing repeated arm or hand movements, such as typing or using a computer mouse, washing windows, vacuuming, or chopping food. Do not use power tools, and avoid activities that cause vibration.     · You may do heavier tasks about 4 weeks after surgery. These include vacuuming, mowing the lawn, and gardening.     · You may shower 24 to 48 hours after surgery, if your doctor okays it. Keep your bandage dry by taping a sheet of plastic to cover it. If you have a splint, keep it dry. Your doctor will tell you if you can remove it when you shower. Be careful not to put the splint on too tight. Do not take a bath until the incision heals, or until your doctor tells you it is okay.     · You may drive when you are fully able to use your hand. Diet    · You can eat your normal diet. If your stomach is upset, try bland, low-fat foods like plain rice, broiled chicken, toast, and yogurt. Medicines    · Your doctor will tell you if and when you can restart your medicines. He or she will also give you instructions about taking any new medicines.     · If you take aspirin or some other blood thinner, ask your doctor if and when to start taking it again. Make sure that you understand exactly what your doctor wants you to do.     · Take pain medicines exactly as directed. ? If the doctor gave you a prescription medicine for pain, take it as prescribed. ? If you are not taking a prescription pain medicine, take an over-the-counter medicine such as acetaminophen (Tylenol), ibuprofen (Advil, Motrin), or naproxen (Aleve). Read and follow all instructions on the label.   ? Do not take two or more pain medicines at the same time unless the doctor told you to. Many pain medicines have acetaminophen, which is Tylenol. Too much acetaminophen (Tylenol) can be harmful.     · If you think your pain medicine is making you sick to your stomach:  ? Take your medicine after meals (unless your doctor has told you not to). ? Ask your doctor for a different pain medicine.     · If your doctor prescribed antibiotics, take them as directed. Do not stop taking them just because you feel better. You need to take the full course of antibiotics. Incision and splint care    · Keep your bandage dry. If it gets dirty, you may change it.     · If you have a splint, talk to your doctor about when you should wear it. Exercise    · You may need wrist and hand rehabilitation. This is a series of exercises you do after your surgery. This helps you get back your wrist's and hand's range of motion, strength, and . You will work with your doctor and physical or occupational therapist to plan this exercise program. To get the best results, you need to do the exercises correctly and as often and as long as your doctor tells you. Ice and elevation    · Put ice or a cold pack on your wrist for 10 to 20 minutes at a time. Try to do this every 1 to 2 hours for the next 3 days (when you are awake) or until the swelling goes down. Put a thin cloth between the ice and your skin.     · Prop up the sore wrist on a pillow when you ice it or anytime you sit or lie down during the next 3 days. Try to keep it above the level of your heart. This will help reduce swelling. Other instructions    · Avoid letting your hand hang down. This can cause swelling. Follow-up care is a key part of your treatment and safety. Be sure to make and go to all appointments, and call your doctor if you are having problems. It's also a good idea to know your test results and keep a list of the medicines you take. When should you call for help? Call 911 anytime you think you may need emergency care.  For example, call if:    · You passed out (lost consciousness).     · You have chest pain, are short of breath, or cough up blood. Call your doctor now or seek immediate medical care if:    · You have pain that does not get better after you take pain medicine.     · Your hand is cool or pale or changes color.     · Your cast or splint feels too tight.     · You have tingling, weakness, or numbness in your hand or fingers.     · You are sick to your stomach or cannot drink fluids.     · You have loose stitches, or your incision comes open.     · You have signs of a blood clot in your leg (called a deep vein thrombosis), such as:  ? Pain in your calf, back of the knee, thigh, or groin. ? Redness or swelling in your leg.     · You have signs of infection, such as:  ? Increased pain, swelling, warmth, or redness. ? Red streaks leading from the incision. ? Pus draining from the incision. ? A fever.     · Bright red blood has soaked through the bandage over your incision. Watch closely for any changes in your health, and be sure to contact your doctor if:    · You have a problem with your cast or splint.     · You do not get better as expected. Where can you learn more? Go to http://www.gray.com/  Enter N388 in the search box to learn more about \"Carpal Tunnel Release: What to Expect at Home. \"  Current as of: July 1, 2021               Content Version: 13.0  © 2006-2021 [employer]. Care instructions adapted under license by [employer] (which disclaims liability or warranty for this information). If you have questions about a medical condition or this instruction, always ask your healthcare professional. [place] [address] any warranty or liability for your use of this information.          DISCHARGE SUMMARY from Nurse    PATIENT INSTRUCTIONS:    After general anesthesia or intravenous sedation, for 24 hours or while taking prescription Narcotics:  · Limit your activities  · Do not drive and operate hazardous machinery  · Do not make important personal or business decisions  · Do  not drink alcoholic beverages  · If you have not urinated within 8 hours after discharge, please contact your surgeon on call.     Report the following to your surgeon:  · Excessive pain, swelling, redness or odor of or around the surgical area  · Temperature over 100.5  · Nausea and vomiting lasting longer than 4 hours or if unable to take medications  · Any signs of decreased circulation or nerve impairment to extremity: change in color, persistent  numbness, tingling, coldness or increase pain  · Any questions

## 2021-09-21 NOTE — BRIEF OP NOTE
Brief Postoperative Note    Patient: Richar Swanson  YOB: 1970  MRN: [id number]    Date of Procedure: 9/21/2021     Pre-Op Diagnosis: Right carpal tunnel syndrome [G56.01]    Post-Op Diagnosis: Same as preoperative diagnosis. Procedure(s):  RIGHT CARPAL TUNNEL RELEASE  *MAC/LOCAL*    Surgeon(s):   Germain Tariq DO    Surgical Assistant: Surg Asst-1: José Plater    Anesthesia: MAC     Estimated Blood Loss (mL): Minimal    Complications: None    Specimens: * No specimens in log *     Implants: * No implants in log *    Drains: * No LDAs found *    Findings: minimal gross changes noted    Electronically Signed by Yon Coyle DO on 9/21/2021 at 11:38 AM

## 2021-09-21 NOTE — OP NOTES
Operative Report    Patient: Clau Carrero MRN: [id number]  SSN: [id number]    YOB: 1970  Age: 46 y.o. Sex: female       Date of Surgery: 9/21/2021     Preoperative Diagnosis: Right carpal tunnel syndrome [G56.01]     Postoperative Diagnosis: Right carpal tunnel syndrome [G56.01]     Surgeon(s) and Role:     Leroy Adames,  - Primary    Assistant: Kristi Hanson    Anesthesia: MAC + Local    Procedure: Procedure(s):  RIGHT CARPAL TUNNEL RELEASE  [id number]    Findings: Minimal Gross Changes    Procedure in Detail:     Indications for procedure been outlined in the perioperative documentation, most notably being not amenable to conservative treatment. Informed consent was obtained from the patient. The risks and benefits of the procedure were discussed with the patient. They include but are not limited to neurovascular injury, tendon/ligamentous injury, blood loss, infection, need for further surgery, hematoma, neuroma, seroma, chronic pain, chronic stiffness, complications from anesthesia including death, and the possibility of oscar Covid. After informed consent was obtained, the patient was taken back to the operative suite. A tourniquet was applied to the operative extremity and it was prepped and draped in the normal sterile fashion. The arm was exsanguinated and the tourniquet was elevated to 250 mmHg. Attention was turned to the carpal tunnel where an incision was made over the radial border of the fourth ray. The subcutaneous tissues were dissected and electrocautery was used for hemostasis. The palmar fascia was incised centrally in line with the incision. A self-retaining retractor was placed. As needed the palmaris brevis was feathered off the transverse carpal ligament. The ligament was identified and incised centrally. Attention was then turned distally where the ligament was released up to the level of the fat pad with motor branch was visualized. Attention was subsequently turned proximally where the remainder of the palmar fascia was incised. At this point the remainder of the transverse carpal ligament was released up to the level of but not including the antebrachial fascia. The carpal tunnel was found to be completely released. The wound was copiously irrigated. The wound was closed with 4-0 chromic in interrupted fashion at the end of the procedure. If not already done so prior to prep, quarter percent Marcaine plain was injected into the subcutaneous and deep tissues. The operative field was cleansed and dried and placed into a sterile dressing, and splint if indicated. The patient was sent to recovery in stable condition and given appropriate wound care instructions, follow-up with me in the outpatient setting, and a prescription for pain medicine. Estimated Blood Loss:  minimal    Tourniquet Time:   Total Tourniquet Time Documented:  Upper Arm (Right) - 10 minutes  Total: Upper Arm (Right) - 10 minutes        Implants: * No implants in log *            Specimens: * No specimens in log *        Drains: None                Complications: None    Counts: Sponge and needle counts were correct times two.     Signed By:  Hair Tapia DO     September 21, 2021

## 2021-09-21 NOTE — H&P
Update History & Physical    The Patient's History and Physical of September 3,   2021 was reviewed with the patient and I examined the patient. There was no change. The surgical site was confirmed by the patient and me. Plan:  The risk, benefits, expected outcome, and alternative to the recommended procedure have been discussed with the patient. Patient understands and wants to proceed with the procedure.     Electronically signed by Sandra Freitas DO on 9/21/2021 at 10:49 AM

## 2021-09-21 NOTE — ANESTHESIA POSTPROCEDURE EVALUATION
Procedure(s):  RIGHT CARPAL TUNNEL RELEASE  *MAC/LOCAL*. MAC    Anesthesia Post Evaluation      Multimodal analgesia: multimodal analgesia used between 6 hours prior to anesthesia start to PACU discharge  Patient location during evaluation: bedside  Patient participation: complete - patient participated  Level of consciousness: awake  Pain score: 5  Pain management: adequate  Airway patency: patent  Anesthetic complications: no  Cardiovascular status: stable  Respiratory status: acceptable  Hydration status: acceptable  Post anesthesia nausea and vomiting:  none  Final Post Anesthesia Temperature Assessment:  Normothermia (36.0-37.5 degrees C)      INITIAL Post-op Vital signs:   Vitals Value Taken Time   /79 09/21/21 1201   Temp 36.7 °C (98.1 °F) 09/21/21 1143   Pulse 103 09/21/21 1203   Resp 22 09/21/21 1203   SpO2 100 % 09/21/21 1203   Vitals shown include unvalidated device data.

## 2021-09-21 NOTE — ANESTHESIA PREPROCEDURE EVALUATION
Relevant Problems   RESPIRATORY SYSTEM   (+) Acute asthma exacerbation   (+) Moderate asthma without complication       Anesthetic History   No history of anesthetic complications            Review of Systems / Medical History  Patient summary reviewed and pertinent labs reviewed    Pulmonary        Sleep apnea: No treatment    Asthma : well controlled       Neuro/Psych         TIA     Cardiovascular  Within defined limits                     GI/Hepatic/Renal  Within defined limits              Endo/Other  Within defined limits           Other Findings              Physical Exam    Airway  Mallampati: II  TM Distance: 4 - 6 cm  Neck ROM: normal range of motion   Mouth opening: Normal     Cardiovascular  Regular rate and rhythm,  S1 and S2 normal,  no murmur, click, rub, or gallop             Dental  No notable dental hx       Pulmonary  Breath sounds clear to auscultation               Abdominal  GI exam deferred       Other Findings            Anesthetic Plan    ASA: 3  Anesthesia type: MAC          Induction: Intravenous  Anesthetic plan and risks discussed with: Patient

## 2021-09-23 ENCOUNTER — TELEPHONE (OUTPATIENT)
Dept: ORTHOPEDIC SURGERY | Age: 51
End: 2021-09-23

## 2021-09-23 NOTE — TELEPHONE ENCOUNTER
Post op patient called and said the dressing is causing her to itch a lot. Patient would like to know when she can remove the dressing. Patient tel. [phone]. Note : patient has a post op appt with  on 10/1/2021.

## 2021-09-24 NOTE — TELEPHONE ENCOUNTER
Patient called in again stating \"she popped another stitch this morning and some of the stitches are loose and she is unsure on what to do\"    Patient is requesting a call back at [phone]

## 2021-09-24 NOTE — TELEPHONE ENCOUNTER
Please have her wash and dry and keep clean. She can put a Band-Aid on the area that appears loose until seen by therapy. She should change this at least once if not twice a day.

## 2021-09-28 ENCOUNTER — HOSPITAL ENCOUNTER (OUTPATIENT)
Dept: PHYSICAL THERAPY | Age: 51
Discharge: HOME | End: 2021-09-28
Payer: MEDICARE

## 2021-09-28 PROCEDURE — 97165 OT EVAL LOW COMPLEX 30 MIN: CPT

## 2021-09-28 NOTE — PROGRESS NOTES
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   ===========================================================================================  Assessment / key information:  pt is a right hand dominant, 46 y.o.y/o, female who has had several year history of pain weakness right hand and had surgery for right CTR on 21. She denies pain unless wrist is placed in awkward position or when fibromyalgia pain hits. Her incision is healing wel with slight blood y drainage. Her wrist AROM is listed with left in ( ): flexion 45 ( 60), extension 55 (75), radial deviaiton 15 ( 25). She can oppose to digit tips 2-4 elliott. She reports difficulty dressing, opening packages, lifting carrying and performing cooking. Her FOTO is 31/100 reflecting very severe impairment in function. She will benefit from skilled occupational therapy to improve ROM,provide scar management and strengthening for return to usual self and home care tasks.     FOTO score: 31  ===========================================================================================  Eval Complexity: History: LOW Complexity : Brief history review ; Examination: LOW Complexity : 1-3 performance deficits relating to physical, cognitive , or psychosocial skils that result in activity limitations and / or participation restrictions ; Decision Making:LOW Complexity : No comorbidities that affect functional and no verbal or physical assistance needed to complete eval tasks   Problem List: Pain effecting function, Decreased range of motion, Decreased strength, Decreased ADL/functional abilities , Decreased activity tolerance and Sensability   Treatment Plan may include any combination of the following: Therapeutic exercise, Therapeutic activities, Physical agent/modality, Scar management, Patient education and ADLs/IADLs  Patient / Family readiness to learn indicated by: asking questions, trying to perform skills and interest  Persons(s) to be included in education: patient (P)  Barriers to Learning/Limitations: None  Measures taken:    Patient Goal (s): Able to  things with less droping   Patient self reported health status: good  Rehabilitation Potential: good   Short Term Goals: To be accomplished in 2 weeks:  1. Patient will be independent in home exercise program for wrist and digit ROM. 2. Patient will be able t use right hand for oral care and eating as previously done. 3.patient will be independent in self care using right assist.     Long Term Goals: To be accomplished in 4  weeks:  1. Patient will increase right wrist ZAVALA in flexion extension by at least 25 degrees for bathing. 2. Patient will tolerate pressure on palm to complete ADLs/IADLs successfully  without discomfort   3. Patient will achieve at least 25# of  in right hand and at least 10# in one prehension pattern for return to light cooking. 4.  Patient to be independent in scar management techniques.   5.  Patient will be able to carry items with right hand as shown by increase in FOTO of at least 20 points. Frequency / Duration:   Patient to be seen 2  times per week for 4  weeks:  Patient / Caregiver education and instruction: self care, activity modification and exercises    Therapist Signature: DAVID Downey Date: 5/44/2408   Certification Period: 9/28/21-10/27/21 Time: 1:53 PM   ===========================================================================================  I certify that the above Occupational Therapy Services are being furnished while the patient is under my care. I agree with the treatment plan and certify that this therapy is necessary. Physician Signature:        Date:       Time:     Please sign and return to [facility] or you may fax the signed copy to [phone]. Thank you.                                        Flora Wilson,

## 2021-09-28 NOTE — PROGRESS NOTES
OT DAILY TREATMENT NOTE     Patient Name: Barb Zhao  Date:2021  : 1970  [x]  Patient  Verified  Payor: [facility] MEDICARE / Plan: [address] [place] [address] CCP / Product Type: Managed Care Medicare /    In time:205  Out time:235  Total Treatment Time (min): 30  Visit #: 1 of 8    Medicare/BCBS Only   Total Timed Codes (min):  0 1:1 Treatment Time:  30     Treatment Area: Right wrist pain [M25.531]  Status post right wrist joint replacement [Z96.631]    SUBJECTIVE  Pain Level (0-10 scale): 0/10  Any medication changes, allergies to medications, adverse drug reactions, diagnosis change, or new procedure performed?: [x] No    [] Yes (see summary sheet for update)  Subjective functional status/changes:   [] No changes reported  Only hurts if I move it certain ways    OBJECTIVE      30 min [x]Eval                  []Re-Eval            With   [] TE   [] TA   [] neuro   [] other: Patient Education: [x] Review HEP    [] Progressed/Changed HEP based on: tendon glides  [] positioning   [] body mechanics   [] transfers   [] heat/ice application   [] Splint wear/care   [] Sensory re-education   [] scar management      [] other:            Other Objective/Functional Measures:   Subjective: pt is a right hand dominant, 46 y.o.y/o, female who has had several year history of pain weakness right hand and had surgery on 21.    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        Objective:  Scar/Wound: size, color, drainage, adhesions, location: slight bloody drainage    Sensation:no tingling during routine   Range of Motion:     Active      Norms Right Left                                                                                      Wrist Flex 0-80 45 60      Ext 0-70 55 75      Ulnar Dev 0-30 35 30      Radial Dev 0-20 15 25         Hand ROM Loose fist  Hand Strength:   Gross Grasp 3pt Pinch Lateral Pinch Tip Pinch   Right        Left 40 10 14 7     Nine-Hole Peg Test:  Left= _18____seconds  Right=__18__seconds  Finger Opposition:to tip 2-4  Palpation: no tenderness    ADLs  Feeding:        []MaxA   [x]ModA   []Jered   [] CGA   []SBA   []Larissa   []Independent  UE Dressing:       []MaxA   []ModA   []Jered   [] CGA   []SBA   []Larissa   [x]Independent  LE Dressing:       []MaxA   []ModA   [x]Jered   [] CGA   []SBA   []Larissa   []Independent  Grooming:       []MaxA   []ModA   [x]Jered   [] CGA   []SBA   []Larissa   []Independent using left  Toileting:       []MaxA   []ModA   [x]Jered   [] CGA   []SBA   []Larissa   []Independentusing left  Bathing:       []MaxA   []ModA   [x]Jered   [] CGA   []SBA   []Larissa   []Independent  Light Meal Prep:    []MaxA   [x]ModA   []Jered   [] CGA   []SBA   []Larissa   []Independent  Household/Other: []MaxA   [x]ModA   []Jered   [] CGA   []SBA   []Larissa   []Independent  Adaptive Equip:     []MaxA   []ModA   []Jered   [] CGA   []SBA   []Larissa   []Independent  Driving:       []MaxA   []ModA   [x]Jered   [] CGA   []SBA   []Larissa   []Independent  Money Mgmt:        []MaxA   []ModA   [x]Jered   [] CGA   []SBA   []Larissa   []Independent       Pain Level (0-10 scale) post treatment: 0/10    ASSESSMENT/Changes in Function:    [x]  See Plan of Care  []  See progress note/recertification  []  See Discharge Summary             PLAN  []  Upgrade activities as tolerated     []  Continue plan of care  []  Update interventions per flow sheet       []  Discharge due to:_  [] Other:_      Orvin Dancer, OTR/L 9/28/2021  1:52 PM    Future Appointments   Date Time Provider Frankie Sosa   9/28/2021  2:00 PM [facility] OT  [address] [facility]   10/1/2021 10:00 AM [facility] INFUSION CHAIR 5 [facility]   10/1/2021  3:45 PM Eric Myers DO [facility] BS AMB

## 2021-10-01 ENCOUNTER — OFFICE VISIT (OUTPATIENT)
Dept: ORTHOPEDIC SURGERY | Age: 51
End: 2021-10-01
Payer: MEDICARE

## 2021-10-01 VITALS
BODY MASS INDEX: 32.95 KG/M2 | HEIGHT: 58 IN | OXYGEN SATURATION: 99 % | TEMPERATURE: 97 F | HEART RATE: 88 BPM | RESPIRATION RATE: 14 BRPM | WEIGHT: 157 LBS

## 2021-10-01 DIAGNOSIS — Z98.890 S/P CARPAL TUNNEL RELEASE: ICD-10-CM

## 2021-10-01 DIAGNOSIS — G56.01 CARPAL TUNNEL SYNDROME OF RIGHT WRIST: Primary | ICD-10-CM

## 2021-10-01 PROCEDURE — 99024 POSTOP FOLLOW-UP VISIT: CPT | Performed by: ORTHOPAEDIC SURGERY

## 2021-10-01 RX ORDER — DICLOFENAC SODIUM 75 MG/1
75 TABLET, DELAYED RELEASE ORAL 2 TIMES DAILY WITH MEALS
Qty: 60 TABLET | Refills: 0 | Status: SHIPPED | OUTPATIENT
Start: 2021-10-01 | End: 2021-10-31

## 2021-10-01 NOTE — LETTER
NOTIFICATION RETURN TO WORK    10/1/2021 3:36 PM    Ms. 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 If there are questions or concerns please have the patient contact our office.         Sincerely,      Iram Kovacs, DO

## 2021-10-01 NOTE — PROGRESS NOTES
Hossein Contreras is a 46 y.o. female right handed individual, not currently working. Worker's Compensation and legal considerations: not known. Vitals:    10/01/21 1455   Pulse: 88   Resp: 14   Temp: 97 °F (36.1 °C)   TempSrc: Temporal   SpO2: 99%   Weight: 157 lb (71.2 kg)   Height: 4' 10\" (1.473 m)   PainSc:   0 - No pain   PainLoc: Hand           Chief Complaint   Patient presents with    Hand Pain     right hand pain       HPI: Patient presents today 2 weeks status post right carpal tunnel release. She reports some opening at her incision. She denies any pain and reports the numbness to be completely resolved. Preop HPI: Patient returns today for follow-up after receiving right carpal tunnel injection. She reports it working for about a week and then wearing off and the numbness coming back. Initial HPI: Patient presents with + EMG for carpal tunnel on right. She has a history of fibromyalgia. She is requesting to just have it \"fixed\" and does not thing a steroid injection will work.     Date of onset:  indeterminate    Injury: No    Prior Treatment:  Yes: Comment:  right carpal tunnel release    Numbness/ Tingling: Yes: Comment: Right hand      ROS: Review of Systems - General ROS: negative  Psychological ROS: negative  ENT ROS: negative  Allergy and Immunology ROS: negative  Hematological and Lymphatic ROS: negative  Respiratory ROS: no cough, shortness of breath, or wheezing  Cardiovascular ROS: no chest pain or dyspnea on exertion  Gastrointestinal ROS: no abdominal pain, change in bowel habits, or black or bloody stools  Musculoskeletal ROS: negative  Neurological ROS: positive for - numbness/tingling  Dermatological ROS: negative    Past Medical History:   Diagnosis Date    Anxiety     Asthma     Controlled    Bipolar 1 disorder (HCC)     [facility]    bladder CA, cervical CA, skin CA    Cervical ca ([facility]) 1984    CTS (carpal tunnel syndrome), right, mild by EMG/NCV 2021    DVT (deep venous thrombosis) (HCC)     x1    Eczema     Fibromyalgia     Hiatal hernia     History of bladder cancer     Nocturia     JOSE (obstructive sleep apnea)     didn't like CPAP    PE (pulmonary thromboembolism) ([address]  , 2012    x 3    Pneumonia     Right wrist pain     Squamous cell carcinoma of left lower leg     Calf and thigh    TIA (transient ischemic attack)     Uterine cancer ([address]        Past Surgical History:   Procedure Laterality Date    HX APPENDECTOMY      HX  SECTION      HX HYSTERECTOMY      HX OTHER SURGICAL      skin CA lesions removed L calf, L thigh with grafting    MI CYSTOTOMY,EXCIS BLADDER TUMOR         Current Outpatient Medications   Medication Sig Dispense Refill    diclofenac EC (VOLTAREN) 75 mg EC tablet Take 1 Tablet by mouth two (2) times daily (with meals) for 30 days. 60 Tablet 0    fluticasone propion-salmeteroL (Advair Diskus) 250-50 mcg/dose diskus inhaler Take 1 Puff by inhalation every twelve (12) hours.  fluticasone propionate (Flonase Allergy Relief) 50 mcg/actuation nasal spray 2 Sprays by Both Nostrils route daily as needed.  calcium carbonate (TUMS) 200 mg calcium (500 mg) chew Take 2 Tablets by mouth daily as needed.  diphenhydrAMINE (Benadryl Allergy) 25 mg tablet Take 25 mg by mouth every six (6) hours as needed for Itching or Allergies.  cyclobenzaprine (FLEXERIL) 10 mg tablet Take 10 mg by mouth three (3) times daily as needed.  FLUoxetine (PROzac) 20 mg capsule TAKE 1 CAPSULE BY MOUTH EVERY DAY      ramelteon (ROZEREM) 8 mg tablet Take  by mouth nightly.  omalizumab (XOLAIR) 150 mg solr 375 mg by SubCUTAneous route every fourteen (14) days.  dupilumab (DUPIXENT) 300 mg/2 mL syrg syringe 300 mg by SubCUTAneous route every fourteen (14) days.  melatonin 10 mg tab Take 1 Tablet by mouth nightly.       EPINEPHrine (EPIPEN) 0.3 mg/0.3 mL injection 0.3 mg by IntraMUSCular route once as needed.  albuterol (PROVENTIL HFA, VENTOLIN HFA, PROAIR HFA) 90 mcg/actuation inhaler Take 2 Puffs by inhalation every four (4) hours as needed.  LORazepam (ATIVAN) 1 mg tablet Take 1 Tab by mouth two (2) times a day. Max Daily Amount: 2 mg. (Patient taking differently: Take 1 mg by mouth three (3) times daily.) 60 Tab 0       Allergies   Allergen Reactions    Pcn [Penicillins] Anaphylaxis    Strawberry Anaphylaxis    Sulfur Anaphylaxis    Adhesive Tape-Silicones Hives     Patient stated tegaderm and paper tape okay    Depakote [Divalproex] Other (comments)     Paralysis      Doxepin Other (comments)     Agitated    Keflex [Cephalexin] Rash    Levaquin [Levofloxacin] Nausea and Vomiting    Seroquel [Quetiapine] Rash    Sulfa (Sulfonamide Antibiotics) Nausea and Vomiting     Magnesium Sulfate    Thorazine [Chlorpromazine] Other (comments)     Agitated      Toradol [Ketorolac] Seizures    Tramadol Seizures           PE:     Physical Exam  Vitals and nursing note reviewed. Constitutional:       General: She is not in acute distress. Appearance: Normal appearance. She is not ill-appearing, toxic-appearing or diaphoretic. Cardiovascular:      Pulses: Normal pulses. Pulmonary:      Effort: Pulmonary effort is normal. No respiratory distress. Abdominal:      General: Abdomen is flat. There is no distension. Musculoskeletal:         General: No swelling, tenderness, deformity or signs of injury. Normal range of motion. Cervical back: Normal range of motion and neck supple. Right lower leg: No edema. Left lower leg: No edema. Skin:     General: Skin is warm and dry. Capillary Refill: Capillary refill takes less than 2 seconds. Findings: No bruising or erythema. Neurological:      General: No focal deficit present. Mental Status: She is alert and oriented to person, place, and time. Cranial Nerves: No cranial nerve deficit.       Sensory: No sensory deficit. Psychiatric:         Mood and Affect: Mood normal.         Behavior: Behavior normal.            Right hand: Incision clean dry and deep healing noted. There is some superficial dehiscence in the epithelial area. There is no erythema drainage or any signs of infection. Range of motion of the digits is full. Neurovascularly intact distally. NCV & EMG Findings:     Evaluation of the right median sensory nerve showed prolonged distal peak latency (4.1 ms) and decreased conduction velocity (Wrist-2nd Digit, 34 m/s). All remaining nerves (as indicated in the following tables) were within normal limits.       All examined muscles (as indicated in the following table) showed no evidence of electrical instability.       INTERPRETATION  This is an abnormal electrodiagnostic examination. These findings may be consistent with:   1. Mild median mononeuropathy at the right wrist (carpal tunnel syndrome)     There is no electrodiagnostic evidence of any cervical radiculopathy, brachial plexopathy, peripheral polyneuropathy, or any other mononeuropathy.     CLINICAL INTERPRETATION     Her electrodiagnostic finding of carpal tunnel syndrome is consistent with her right hand symptoms. Imaging:     None indicated        ICD-10-CM ICD-9-CM    1. Carpal tunnel syndrome of right wrist  G56.01 354.0 diclofenac EC (VOLTAREN) 75 mg EC tablet   2. S/P carpal tunnel release  Z98.890 V45.89 diclofenac EC (VOLTAREN) 75 mg EC tablet         Plan:     Proceed with therapy as scheduled. Discussed wound care and range of motion exercises. Follow-up and Dispositions    · Return in about 5 weeks (around 11/5/2021) for Reevaluation and wound check.           Plan was reviewed with patient, who verbalized agreement and understanding of the plan

## 2021-10-11 ENCOUNTER — HOSPITAL ENCOUNTER (OUTPATIENT)
Dept: PHYSICAL THERAPY | Age: 51
Discharge: HOME | End: 2021-10-11
Payer: MEDICARE

## 2021-10-11 PROCEDURE — 97110 THERAPEUTIC EXERCISES: CPT

## 2021-10-11 PROCEDURE — 97018 PARAFFIN BATH THERAPY: CPT

## 2021-10-11 PROCEDURE — 97530 THERAPEUTIC ACTIVITIES: CPT

## 2021-10-11 NOTE — PROGRESS NOTES
OCCUPATIONAL THERAPY - DAILY TREATMENT NOTE    Patient Name: Huang Martinez        Date: 10/11/2021  : 1970   YES Patient  Verified  Visit #:   2   of   8  Insurance: Payor: Valerie Nugent / Plan: [facility] CCP / Product Type: Managed Care Medicare /       In time: 2:52 Out time: 3:30   Total Treatment Time: 38     Medicare/BCBS Time Tracking (below)   Total Timed Codes (min):  28 1:1 Treatment Time:  38     TREATMENT AREA =  Right hand     SUBJECTIVE    Pain Level (on 0 to 10 scale):  0  / 10   Medication Changes/New allergies or changes in medical history, any new surgeries or procedures? NO    If yes, update Summary List   Subjective Functional Status/Changes:  []  No changes reported     \"I can use my hand more\"   \"Can't pick anything up with weight\"    \"Drained a little last night\"        OBJECTIVE    Modalities Rationale:     decrease inflammation, decrease pain and increase tissue extensibility to improve patient's ability to .     min [] Estim, type/location:                                      []  att     []  unatt     []  w/US     []  w/ice    []  w/heat    min []  Ultrasound, settings/location:      min []  Iontophoresis w/ dexamethasone, location:                                               []  take home patch       []  in clinic   8  concurrent with paraffin min []  Ice     [x]  Heat    location/position:    10 min [x]  Paraffin:  location Seated/resting  Right hand/wrist( in bag)       min []  Vasopneumatic Device, press/temp:     min []  Other:    [x] Skin assessment post-treatment (if applicable):    [x]  intact    []  redness- no adverse reaction     []redness - adverse reaction:        18 min Therapeutic Exercise:  [x]  See flow sheet   Rationale:      increase ROM and increase strength to improve the patients ability to .      Right hand/wrist:      Wrist ROM flex/ext   Wrist maze x8  Tendon glides    Digit PROM   Towel scrunches        10 min Therapeutic Activity:    Rationale:    increase ROM, improve coordination and patient education  to improve the patients ability to self-manage symptoms and improve ability to manipulate items. Self-management strategies - pain and stiffness using heat modalities. Dexterity balls x20 each way. min Patient Education:  YES  Reviewed HEP   [x]  Progressed/Changed HEP based on: Other Objective/Functional Measures:    Late arrival to session. Completed all wrist and digit ROM without pain/discomfort. Post Treatment Pain Level (on 0 to 10) scale:   0  / 10     ASSESSMENT  Assessment/Changes in Function: improving ability to make composite fist, improving digit ROM, improving ability to self-manage symptoms, improved stiffness with heat modalities. []  See Progress Note/Recertification   Patient will continue to benefit from skilled OT services to address ROM deficits, address strength deficits, analyze and address soft tissue restrictions and analyze and modify body mechanics/ergonomics to attain remaining goals. Progress toward goals / Updated goals:    Short Term Goals: To be accomplished in 2 weeks:  1. Patient will be independent in home exercise program for wrist and digit ROM. 10/11/21: Initiated wrist ROM HEP and digit PROM HEP. 2. Patient will be able t use right hand for oral care and eating as previously done. 10/11/21: improving use of hand, per pt report. 3.patient will be independent in self care using right assist.  10/11/21: Using right hand for toileting, per pt report.      · Long Term Goals: To be accomplished in 4  weeks:  1. Patient will increase right wrist ZAVALA in flexion extension by at least 25 degrees for bathing. 2. Patient will tolerate pressure on palm to complete ADLs/IADLs successfully  without discomfort   3. Patient will achieve at least 25# of  in right hand and at least 10# in one prehension pattern for return to light cooking.   4. Patient to be independent in scar management techniques. 5.  Patient will be able to carry items with right hand as shown by increase in FOTO of at least 20 points.       PLAN  []  Upgrade activities as tolerated YES Continue plan of care   []  Discharge due to :    []  Other:      Therapist: Geryl Councilman, OTA    Date: 10/11/2021 Time: 2:54 PM     Future Appointments   Date Time Provider Frankie Sosa   10/15/2021  9:30 AM [address] [place] [address]   10/18/2021  2:45 PM [facility] OT  [address] [facility]   10/19/2021  2:45 PM [facility] OT  [address] [facility]   10/25/2021  2:45 PM [facility] OT AT [id number]

## 2021-10-14 ENCOUNTER — HOSPITAL ENCOUNTER (OUTPATIENT)
Dept: PHYSICAL THERAPY | Age: 51
Discharge: HOME | End: 2021-10-14
Payer: MEDICARE

## 2021-10-14 PROCEDURE — 97110 THERAPEUTIC EXERCISES: CPT

## 2021-10-14 PROCEDURE — 97530 THERAPEUTIC ACTIVITIES: CPT

## 2021-10-14 PROCEDURE — 97018 PARAFFIN BATH THERAPY: CPT

## 2021-10-14 NOTE — PROGRESS NOTES
OCCUPATIONAL THERAPY - DAILY TREATMENT NOTE    Patient Name: Angie Quevedo        Date: 10/14/2021  : 1970   YES Patient  Verified  Visit #:   3   of   8  Insurance: Payor: Oscar Rucker / Plan: [facility] [address] [facility] CCP / Product Type: Managed Care Medicare /      In time: 4:15 Out time: 5:00   Total Treatment Time: 45     Medicare/BCBS Time Tracking (below)   Total Timed Codes (min):  35 1:1 Treatment Time:  45     TREATMENT AREA =  Right wrist     SUBJECTIVE    Pain Level (on 0 to 10 scale):  0  / 10   Medication Changes/New allergies or changes in medical history, any new surgeries or procedures? NO    If yes, update Summary List   Subjective Functional Status/Changes:  []  No changes reported     \"Hand was feeling very sore and maybe it was from all the exercises\"     Hand doctor apt scheduled for 21     OBJECTIVE    Modalities Rationale:     decrease inflammation, decrease pain and increase tissue extensibility to improve patient's ability to .                 min []? Estim, type/location:                                                            []?  att     []?  unatt     []?  w/US     []?  w/ice    []?  w/heat     min []? Ultrasound, settings/location:        min []? Iontophoresis w/ dexamethasone, location:                                                []?  take home patch       []? in clinic   8  concurrent with paraffin min []? Ice     [x]? Heat    location/position:  Seated/resting  Right hand/wrist   10 min [x]? Paraffin:  location Seated/resting  Right hand/wrist( in bag)         min []? Vasopneumatic Device, press/temp:       min []? Other:     [x]? Skin assessment post-treatment (if applicable):    [x]? intact    []? redness- no adverse reaction     []? redness - adverse reaction:          25 min Therapeutic Exercise:  [x]  See flow sheet   Rationale:      increase ROM and increase strength to improve the patients ability to .      Right hand/wrist:    Ball rolls table top   Wrist maze   Opposition with marbles   Abd/add with marbles     10 min Therapeutic Activity:    Rationale:    increase ROM and improve coordination to improve the patients ability to manipulate small items. Right hand:     Dexterity balls x15 each way   Translation with marbles sets of 5.      min Patient Education:  Joy Bryant   []  Progressed/Changed HEP based on: Independent with digit blocking. Other Objective/Functional Measures:    Completed wrist and Digit ROM without pain/discomfort. Post Treatment Pain Level (on 0 to 10) scale:   0  / 10     ASSESSMENT  Assessment/Changes in Function: Improving wrist and digit AROM, on going difficulty opposing 5th digit,  decreased stiffness with heat modalities. []  See Progress Note/Recertification   Patient will continue to benefit from skilled OT services to address ROM deficits, address strength deficits, analyze and address soft tissue restrictions and analyze and modify body mechanics/ergonomics to attain remaining goals. Progress toward goals / Updated goals:    Short Term Goals: To be accomplished in 2 weeks:  1.  Patient will be independent in home exercise program for wrist and digit ROM. 10/11/21: Initiated wrist ROM HEP and digit PROM HEP.   10/14/21: Independent with digit PROM.      2. Patient will be able to use right hand for oral care and eating as previously done. 10/11/21: improving use of hand, per pt report.       3. Patient will be independent in self care using right assist.  10/11/21: Using right hand for toileting, per pt report.      · Long Term Goals: To be accomplished in 4  weeks:  1.  Patient will increase right wrist ZAVALA in flexion extension by at least 25 degrees for bathing.   2. Patient will tolerate pressure on palm to complete ADLs/IADLs successfully  without discomfort   3.  Patient will achieve at least 25# of  in right hand and at least 10# in one prehension pattern for return to light cooking. 4.  Patient to be independent in scar management techniques. 5.  Patient will be able to carry items with right hand as shown by increase in FOTO of at least 20 points.       PLAN  []  Upgrade activities as tolerated YES Continue plan of care   []  Discharge due to :    []  Other:      Therapist: ISIAH Rivera    Date: 10/14/2021 Time: 4:18 PM     Future Appointments   Date Time Provider Frankie Sheila   10/15/2021  9:30 AM [address] [facility] [address] [facility] [address]   10/18/2021  2:45 PM [facility] OT  [address] [facility]   10/19/2021  2:45 PM [facility] OT  [address] [facility]   10/25/2021  2:45 PM [facility] OT  [address] [facility]   10/26/2021  2:00 PM [facility] OT  [address] [facility]   10/29/2021  9:30 AM [facility]FUSION [facility]AIR 1 [facility]   11/5/2021  3:15 PM Eric Myers DO [facility] AM[facility]

## 2021-10-18 ENCOUNTER — HOSPITAL ENCOUNTER (OUTPATIENT)
Dept: PHYSICAL THERAPY | Age: 51
Discharge: HOME | End: 2021-10-18
Payer: MEDICARE

## 2021-10-18 PROCEDURE — 97140 MANUAL THERAPY 1/> REGIONS: CPT

## 2021-10-18 PROCEDURE — 97110 THERAPEUTIC EXERCISES: CPT

## 2021-10-18 NOTE — PROGRESS NOTES
OCCUPATIONAL THERAPY - DAILY TREATMENT NOTE    Patient Name: Isabelle Dee        Date: 10/18/2021  : 1970   YES Patient  Verified  Visit #:   4   of   8  Insurance: Payor: Aba Uriarte / [facility] / Product Type: Managed Care Medicare /      In time: 2:49 Out time: 3:30   Total Treatment Time: 41     Medicare/BCBS Time Tracking (below)   Total Timed Codes (min):  31 1:1 Treatment Time:  41     TREATMENT AREA =  Right wrist    SUBJECTIVE     Pain Level (on 0 to 10 scale):  0  / 10   Medication Changes/New allergies or changes in medical history, any new surgeries or procedures? NO    If yes, update Summary List   Subjective Functional Status/Changes:  []  No changes reported     \"Wrist is tight , but I am not in pain\"   \" I worked the other night and I felt okay\"   \"Flexing my wrist causing a zapping feeling in my hand\"        OBJECTIVE    Modalities Rationale:     decrease inflammation, decrease pain and increase tissue extensibility to improve patient's ability to .                          min []? ? Estim, type/location:                                                            []? ?  att     []? ?  unatt     []? ?  w/US     []? ?  w/ice    []? ?  w/heat     min []? ?  Ultrasound, settings/location:        min []? ?  Iontophoresis w/ dexamethasone, location:                                                []? ?  take home patch       []? ?[address]  [place] [address]  concurrent with paraffin min []? ?  Ice     [x]? ?  Heat    location/position:  Seated/resting  Right hand/wrist   10 min [x]? ?  Paraffin:  location Seated/resting  Right hand/wrist( in bag)         min []? ?  Vasopneumatic Device, press/temp:       min []? ?  Other:     [x]? ? Skin assessment post-treatment (if applicable):    [x]? ?  intact    []? ?  redness- no adverse reaction     []? ?redness - adverse reaction:          23 min Therapeutic Exercise:  [x]  See flow sheet   Rationale:      increase ROM and increase strength to improve the patients ability to . Right hand/wrist:     Prayer stretch   Wrist mazes  Peg removal from soft putty 10/10 pegs. 8 min Manual Therapy:    Rationale:     Increase ROM , increase tissue extensibility to improve patient's ability to make a composite fist.   The manual therapy interventions were performed at a separate and distinct time from the therapeutic activities interventions. Edema massage right digits, palm, and wrist.     Other Objective/Functional Measures:    Completed pinch strengthening without pain/discomfort. Unable to oppose SF to thumb. Post Treatment Pain Level (on 0 to 10) scale:   0  / 10     ASSESSMENT  Assessment/Changes in Function: progressing digit ROM, decreased wrist and digit stiffness with heat and massage modalities. []  See Progress Note/Recertification   Patient will continue to benefit from skilled OT services to address ROM deficits, address strength deficits, analyze and address soft tissue restrictions and analyze and modify body mechanics/ergonomics to attain remaining goals. Progress toward goals / Updated goals:    Short Term Goals: To be accomplished in 2 weeks:  1.  Patient will be independent in home exercise program for wrist and digit ROM. 10/11/21: Initiated wrist ROM HEP and digit PROM HEP.   10/14/21: Independent with digit PROM. 10/18/21: Independent with wrist ROM HEP. Initiated soft putty HEP.      2. Patient will be able to use right hand for oral care and eating as previously done. 10/11/21: improving use of hand, per pt report.       3. Patient will be independent in self care using right assist.  10/11/21: Using right hand for toileting, per pt report.      · Long Term Goals: To be accomplished in 4  weeks:    1.  Patient will increase right wrist ZAVALA in flexion extension by at least 25 degrees for bathing.   2. Patient will tolerate pressure on palm to complete ADLs/IADLs successfully  without discomfort   3.  Patient will achieve at least 25# of  in right hand and at least 10# in one prehension pattern for return to light cooking. 4.  Patient to be independent in scar management techniques.   5.  Patient will be able to carry items with right hand as shown by increase in FOTO of at least 20 points.      PLAN  []  Upgrade activities as tolerated YES Continue plan of care   []  Discharge due to :    []  Other:      Therapist: ISIAH Rader    Date: 10/18/2021 Time: 3:03 PM     Future Appointments   Date Time Provider Frankie Sosa   10/19/2021  2:45 PM [facility] OT  [address] [facility]   10/25/2021  2:45 PM [facility] OT  [address] [facility]   10/26/2021  2:00 PM [facility] OT  [address] [facility]   10/29/2021  9:30 AM [address] [facility]   11/5/2021  3:15 PM Eric Meyrs DO [facility]

## 2021-10-19 ENCOUNTER — HOSPITAL ENCOUNTER (OUTPATIENT)
Dept: PHYSICAL THERAPY | Age: 51
Discharge: HOME | End: 2021-10-19
Payer: MEDICARE

## 2021-10-19 PROCEDURE — 97110 THERAPEUTIC EXERCISES: CPT

## 2021-10-19 PROCEDURE — 97140 MANUAL THERAPY 1/> REGIONS: CPT

## 2021-10-19 NOTE — PROGRESS NOTES
OCCUPATIONAL THERAPY - DAILY TREATMENT NOTE    Patient Name: Alonso Estrada        Date: 10/19/2021  : 1970   YES Patient  Verified  Visit #:   5   of   8  Insurance: Payor: Esdras Pearson / Plan: [address] [facility] CCP / Product Type: Managed Care Medicare /      In time: 2:46 Out time: 3:31   Total Treatment Time: 45     Medicare/BCBS Time Tracking (below)   Total Timed Codes (min):  35 1:1 Treatment Time:  45     TREATMENT AREA =  Right wrist    SUBJECTIVE    Pain Level (on 0 to 10 scale):  0  / 10   Medication Changes/New allergies or changes in medical history, any new surgeries or procedures? NO    If yes, update Summary List   Subjective Functional Status/Changes:  []  No changes reported     \"My hand is healing more because I don't wear band aids on my hand anymore\"       OBJECTIVE    Modalities Rationale:     decrease inflammation, decrease pain and increase tissue extensibility to improve patient's ability to .                          min []? ?? Estim, type/location:                                                            []? ??  att     []? ??  unatt     []? ??  w/US     []? ??  w/ice    []? ??  w/heat     min []? ??  Ultrasound, settings/location:        min []? ??  Iontophoresis w/ dexamethasone, location:                                                []? ??  take home patch       []? ??  in clinic   8  concurrent with paraffin min []? ??  Ice     [x]? ??  Heat    location/position:  Seated/resting  Right hand/wrist   10 min [x]? ??  Paraffin:  location Seated/resting  Right hand/wrist( in bag)         min []? ??  Vasopneumatic Device, press/temp:       min []? ??  Other:     [x]? ?? Skin assessment post-treatment (if applicable):    [x]? ??  intact    []? ??  redness- no adverse reaction     []? ??redness - adverse reaction:          27 min Therapeutic Exercise:  [x]  See flow sheet   Rationale:    increase ROM and increase strength to improve the patients ability to .      Right hand/wrist:      Wrist ROM exercises  Wrist mazes  Towel scrunches  hookfist with marker   Peg removal from soft putty 10/10 pegs. Putty hand strengthening HEP - yellow soft putty. 8 min Manual Therapy:    Rationale:       Increase ROM , increase tissue extensibility to improve patient's ability to make a composite fist.   The manual therapy interventions were performed at a separate and distinct time from the therapeutic activities interventions.     Edema massage right digits, palm, and wrist.        min Patient Education:  YES  Reviewed HEP   [x]  Progressed/Changed HEP based on:   Reviewed edema massage strategies. Other Objective/Functional Measures:    Completed all pinch strengthening exercises without pain. Post Treatment Pain Level (on 0 to 10) scale:   0  / 10     ASSESSMENT  Assessment/Changes in Function: decreased wrist stiffness with heat modalities, improving ability to oppose all digits, improving pinch strength. []  See Progress Note/Recertification   Patient will continue to benefit from skilled OT services to address ROM deficits, address strength deficits, analyze and address soft tissue restrictions and analyze and modify body mechanics/ergonomics to attain remaining goals. Progress toward goals / Updated goals:    Short Term Goals: To be accomplished in 2 weeks:  1.  Patient will be independent in home exercise program for wrist and digit ROM. 10/11/21: Initiated wrist ROM HEP and digit PROM HEP.   10/14/21: Independent with digit PROM.    10/18/21: Independent with wrist ROM HEP. Initiated soft putty HEP.      2. Patient will be able to use right hand for oral care and eating as previously done. 10/11/21: improving use of hand, per pt report.       3. Patient will be independent in self care using right assist.  10/11/21: Using right hand for toileting, per pt report.      · Long Term Goals:  To be accomplished in 4  weeks:     1.  Patient will increase right wrist ZAVALA in flexion extension by at least 25 degrees for bathing. 2. Patient will tolerate pressure on palm to complete ADLs/IADLs successfully  without discomfort   3.  Patient will achieve at least 25# of  in right hand and at least 10# in one prehension pattern for return to light cooking. 10/19/21: progressing with putty HEP. 4.  Patient to be independent in scar management techniques. 10/19/21: improving with education on scar management strategies.      5.  Patient will be able to carry items with right hand as shown by increase in FOTO of at least 20 points.      PLAN  []  Upgrade activities as tolerated YES Continue plan of care   []  Discharge due to :    []  Other:      Therapist: ISIAH Muhammad    Date: 10/19/2021 Time: 2:52 PM     Future Appointments   Date Time Provider Frankie Sosa   10/25/2021  2:45 PM [facility] OT  [address] [facility]   10/26/2021  2:00 PM [facility] OT  [address] [facility]   10/29/2021  9:30 AM [address] [facility] [address] [facility]   11/5/2021  3:15 PM Eric Myers DO [facility] AM[facility]

## 2021-10-25 ENCOUNTER — HOSPITAL ENCOUNTER (OUTPATIENT)
Dept: PHYSICAL THERAPY | Age: 51
Discharge: HOME | End: 2021-10-25
Payer: MEDICARE

## 2021-10-25 PROCEDURE — 97110 THERAPEUTIC EXERCISES: CPT

## 2021-10-25 PROCEDURE — 97018 PARAFFIN BATH THERAPY: CPT

## 2021-10-25 PROCEDURE — 97530 THERAPEUTIC ACTIVITIES: CPT

## 2021-10-25 NOTE — PROGRESS NOTES
OCCUPATIONAL THERAPY - DAILY TREATMENT NOTE    Patient Name: Timothy Portal        Date: 10/25/2021  : 1970   YES Patient  Verified  Visit #:   6   of   8  Insurance: Payor: J Carlos Huynh / Plan: [facility] CCP / Product Type: Managed Care Medicare /      In time: 2:47 Out time: 3:30   Total Treatment Time: 43     Medicare/BCBS Time Tracking (below)   Total Timed Codes (min):  33 1:1 Treatment Time:  43     TREATMENT AREA =  Right wrist       SUBJECTIVE    Pain Level (on 0 to 10 scale):  0/ 10   Medication Changes/New allergies or changes in medical history, any new surgeries or procedures? NO    If yes, update Summary List   Subjective Functional Status/Changes:  []  No changes reported     \"My hand is close andhealed\"  \"I can't wait for it to go back to work\"  \"I can use my hand for oral care, eating, doing hair care, buttoning\"       OBJECTIVE    Modalities Rationale:     decrease pain and increase tissue extensibility to improve patient's ability to .     min [] Estim, type/location:                                      []  att     []  unatt     []  w/US     []  w/ice    []  w/heat    min []  Ultrasound, settings/location:      min []  Iontophoresis w/ dexamethasone, location:                                               []  take home patch       []  in clinic    min []  Ice     []  Heat    location/position:    10 min [x]  Paraffin:  location Seated/resting   Right hand    min []  Vasopneumatic Device, press/temp:     min []  Other:    [x] Skin assessment post-treatment (if applicable):    [x]  intact    [x]  redness- no adverse reaction     []redness - adverse reaction:         18 min Therapeutic Exercise:  [x]  See flow sheet   Rationale:      increase ROM and increase strength to improve the patients ability to . Right hand/wrist:     Ball rolls tabletop   Hammer stretch - small hammer 15x   Peg removal from soft putty - 10/10 1/4\" pegs removed. Putty HEP. Opposition slide   Hookfist with wooden dowel  Opposition with marbles   T-bar 3 ways x10 each way. 15 min Therapeutic Activity:    Rationale:    increase ROM and improve coordination to improve the patients ability to manipulate small items. Right hand:     Translation with 1\" pegs - sets of 3 placed into peg board. Dexterity balls 20 each way        min Patient Education:  YES  Reviewed HEP   [x]  Progressed/Changed HEP based on:   Reviewed Putty HEP - independent with hand strengthening HEP. Other Objective/Functional Measures:    Completed all  and pinch strengthening without pain. Post Treatment Pain Level (on 0 to 10) scale:   0  / 10     ASSESSMENT  Assessment/Changes in Function:  Improving digit AROM, Improving strength, improving ability to use right hand for self-care tasks. []  See Progress Note/Recertification   Patient will continue to benefit from skilled OT services to address ROM deficits, address strength deficits, analyze and address soft tissue restrictions and analyze and modify body mechanics/ergonomics to attain remaining goals. Progress toward goals / Updated goals:    Short Term Goals: To be accomplished in 2 weeks:  1.  Patient will be independent in home exercise program for wrist and digit ROM. 10/11/21: Initiated wrist ROM HEP and digit PROM HEP.   10/14/21: Independent with digit PROM.     10/18/21: Independent with wrist ROM HEP. Initiated soft putty HEP.   10/25/21: independent with putty HEP. Goal met.      2. Patient will be able to use right hand for oral care and eating as previously done. 10/11/21: improving use of hand, per pt report.   10/25/21: using right hand for oral care, eating, doing hair care, buttoning as previously done, pet pt report. Goal met.       3. Patient will be independent in self care using right assist.  10/11/21: Using right hand for toileting, per pt report.    10/25/21: Using right hand for self care, goal met.    · Long Term Goals: To be accomplished in 4  weeks:     1.  Patient will increase right wrist ZAVALA in flexion extension by at least 25 degrees for bathing. 2. Patient will tolerate pressure on palm to complete ADLs/IADLs successfully  without discomfort   10/25/21: able to tolerate light pressure on palm with soft putty flatten , progressing. 3.  Patient will achieve at least 25# of  in right hand and at least 10# in one prehension pattern for return to light cooking. 10/19/21: progressing with putty HEP.       4.  Patient to be independent in scar management techniques.   10/19/21: improving with education on scar management strategies.      5.  Patient will be able to carry items with right hand as shown by increase in FOTO of at least 20 points.      PLAN  []  Upgrade activities as tolerated YES Continue plan of care   []  Discharge due to :    []  Other:      Therapist: ISIAH Green    Date: 10/25/2021 Time: 2:55 PM     Future Appointments   Date Time Provider Frankie Sosa   10/26/2021  2:00 PM [facility] OT  W. Travon [facility]   10/29/2021  9:30 AM [facility]   11/5/2021  3:15 PM Eric Myers DO [facility] BS AMB

## 2021-10-26 ENCOUNTER — HOSPITAL ENCOUNTER (OUTPATIENT)
Dept: PHYSICAL THERAPY | Age: 51
Discharge: HOME | End: 2021-10-26
Payer: MEDICARE

## 2021-10-26 PROCEDURE — 97110 THERAPEUTIC EXERCISES: CPT

## 2021-10-26 PROCEDURE — 97018 PARAFFIN BATH THERAPY: CPT

## 2021-10-26 NOTE — PROGRESS NOTES
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 1. Patient will be independent in home exercise program for wrist and digit ROM   Status at last Eval: Did not have Current Status: met yes   2. Patient will be able to use right hand for oral care and eating as previously done   Status at last Eval: unable Current Status: met yes   3. Patient will be independent in self care using right assist.   Status at last Eval: REquired assist Current Status: met yes   4. Patient will increase right wrist ZAVALA in flexion extension by at least 25 degrees for bathing   Status at last Eval: ZAVALA 100 Current Status: Zavala 155 yes      Goal Met? 1. Patient will tolerate pressure on palm to complete ADLs/IADLs successfully  without discomfort    Status at last Eval: unable Current Status: progressing with soft putty no   2. Patient will achieve at least 25# of  in right hand and at least 10# in one prehension pattern for return to light cooking   Status at last Eval: Unable to test per protocol Current Status:  22#  Lateral pinch 13, tip 9  3 point 9 yes   3.   Patient to be independent in scar management techniques. Status at last Eval: Not addressed,  Current Status: progressing no   4. Patient will be able to carry items with right hand as shown by increase in FOTO of at least 20 points   Status at last Eval: FOTO31 Current Status: FOTO 61 yes     Key Functional Changes/Progress: Improved wrist ROm, initial strength    Problem List: decrease strength, decrease ADL/ functional abilitiies and decrease activity tolerance   Treatment Plan may include any combination of the following: Therapeutic exercise, Therapeutic activities, Physical agent/modality, Scar management, Patient education and ADLs/IADLs  Patient Goal(s) has been updated and includes:  Regain strength to be able to lift pots      Goals for this certification period include and are to be achieved in   4  weeks:   Patient will tolerate pressure on palm to complete ADLs/IADLs successfully  without discomfort     Patient will achieve at least 40# of  in right hand for return to light cooking    4.  Patient to be independent in scar management techniques  Frequency / Duration:   Patient to be seen   2   times per week for   4    weeks:    Assessments/Recommendations: Patient will benefit from continued skilled occupational therapy to increase upper extremity function and functional independence. If you have any questions/comments please contact us directly at [phone]. Thank you for allowing us to assist in the care of your patient. Therapist Signature: Haroon Sierra OTR/L Date: 71/51/8835   Certification Period:  Reporting Period: na  9/28/21-10/26/21 Time: 5:01 PM   NOTE TO PHYSICIAN:  PLEASE COMPLETE THE ORDERS BELOW AND FAX TO   [facility] Physical Therapy: [phone].   If you are unable to process this request in 24 hours please contact our office: [phone].    ___ I have read the above report and request that my patient continue as recommended.   ___ I have read the above report and request that my patient continue therapy with the following changes/special instructions: ________________________________________________   ___ I have read the above report and request that my patient be discharged from therapy.      Physician Signature:        Date:       Time:                                        Clarissa Wright DO

## 2021-10-26 NOTE — PROGRESS NOTES
OCCUPATIONAL THERAPY - DAILY TREATMENT NOTE    Patient Name: Jeffry Galdamez        Date: 10/26/2021  : 1970   YES Patient  Verified  Visit #:   6   of   8  Insurance: Payor: Shana Bird / Plan: [facility] [address] [facility] CCP / Product Type: Managed Care Medicare /      In time: 215 Out time: 255   Total Treatment Time: 40     TREATMENT AREA =  Right hand    SUBJECTIVE    Pain Level (on 0 to 10 scale):  0  / 10   Medication Changes/New allergies or changes in medical history, any new surgeries or procedures?     NO    If yes, update Summary List   Subjective Functional Status/Changes:  []  No changes reported     I am worried about my strength          OBJECTIVE    Modalities Rationale:     decrease pain and increase tissue extensibility to improve patient's ability to grasp    min [] Estim, type/location:                                      []  att     []  unatt     []  w/US     []  w/ice    []  w/heat    min []  Ultrasound, settings/location:      min []  Iontophoresis w/ dexamethasone, location:                                               []  take home patch       []  in clinic    min []  Ice     []  Heat    location/position:    10 min [x]  Paraffin:  location Right hand    min []  Vasopneumatic Device, press/temp:     min []  Other:    [x] Skin assessment post-treatment (if applicable):    [x]  intact    []  redness- no adverse reaction     []redness - adverse reaction:        30 min Therapeutic Exercise:  [x]  See flow sheet   Rationale:      increase ROM and increase strength to improve the patients ability to grasp   Recheck ROM,  and pinch  Putty HEP tendon glides      min Patient Education:  YES  Reviewed HEP   []  Progressed/Changed HEP based on:   Putty HEP tendon glides     Other Objective/Functional Measures:  Wrist Flex         70 (45)  Ext                     85 (55)  RD                      30 (15)  UD                      35 (35)      Hand Strength: Gross Grasp 3pt Pinch Lateral Pinch Tip Pinch   Right  22 9 13 9   Left 40 10 14 7              0  / 10     ASSESSMENT  Assessment/Changes in Function:     Improved ROm, good initial      []  See Progress Note/Recertification   Patient will continue to benefit from skilled OT services to modify and progress therapeutic interventions, address ROM deficits, address strength deficits and instruct in home and community integration to attain remaining goals. Progress toward goals / Updated goals:  .  Patient will be independent in home exercise program for wrist and digit ROM. 10/11/21: Initiated wrist ROM HEP and digit PROM HEP.   10/14/21: Independent with digit PROM.     10/18/21: Independent with wrist ROM HEP. Initiated soft putty HEP.   10/25/21: independent with putty HEP. Goal met.      2. Patient will be able to use right hand for oral care and eating as previously done. 10/11/21: improving use of hand, per pt report.   10/25/21: using right hand for oral care, eating, doing hair care, buttoning as previously done, pet pt report. Goal met.       3. Patient will be independent in self care using right assist.  10/11/21: Using right hand for toileting, per pt report.    10/25/21: Using right hand for self care, goal met.      · Long Term Goals: To be accomplished in 4  weeks:     1.  Patient will increase right wrist ZAVALA in flexion extension by at least 25 degrees for bathing. Eval status:   Current status; Met     2. Patient will tolerate pressure on palm to complete ADLs/IADLs successfully  without discomfort   10/25/21: able to tolerate light pressure on palm with soft putty flatten , progressing. 3.  Patient will achieve at least 25# of  in right hand and at least 10# in one prehension pattern for return to light cooking. 10/19/21: progressing with putty HEP.     Current status: Progressing, now 22#     4.  Patient to be independent in scar management techniques.   10/19/21: improving with education on scar management strategies.      5.  Patient will be able to carry items with right hand as shown by increase in FOTO of at least 20 points.   Current status; met     PLAN  []  Upgrade activities as tolerated YES Continue plan of care   []  Discharge due to :    []  Other:      Therapist: VINH Caruso/MONSTER    Date: 10/26/2021 Time: 8:01 AM     Future Appointments   Date Time Provider Frankie Sosa   10/26/2021  2:00 PM [facility] OT  [address] [facility]   10/29/2021  9:30 AM [facility]   11/5/2021  3:15 PM Eric Myers DO [facility] BS AMB

## 2021-11-01 ENCOUNTER — HOSPITAL ENCOUNTER (OUTPATIENT)
Dept: PHYSICAL THERAPY | Age: 51
Discharge: HOME | End: 2021-11-01
Payer: MEDICARE

## 2021-11-01 PROCEDURE — 97110 THERAPEUTIC EXERCISES: CPT

## 2021-11-01 PROCEDURE — 97112 NEUROMUSCULAR REEDUCATION: CPT

## 2021-11-01 PROCEDURE — 97018 PARAFFIN BATH THERAPY: CPT

## 2021-11-01 NOTE — PROGRESS NOTES
OCCUPATIONAL THERAPY - DAILY TREATMENT NOTE    Patient Name: Clifton Nugent        Date: 2021  : 1970   YES Patient  Verified  Visit #:   1   of   8  Insurance: Payor: [facility] / Plan: [facility] CCP / Product Type: Managed Care Medicare /      In time: 11:55 Out time: 12:30   Total Treatment Time: 35     Medicare/BCBS Time Tracking (below)   Total Timed Codes (min):  27 1:1 Treatment Time:  35     TREATMENT AREA =  Right wrist      SUBJECTIVE    Pain Level (on 0 to 10 scale):  3   10   Medication Changes/New allergies or changes in medical history, any new surgeries or procedures? NO    If yes, update Summary List   Subjective Functional Status/Changes:  []  No changes reported     \"My wrist is hurting a little more today\"   \"More aching than pain\"   \"I am back to work and doing okay\"          OBJECTIVE    Modalities Rationale:     decrease edema, decrease inflammation, decrease pain and increase tissue extensibility to improve patient's ability to .    min [] Estim, type/location:                                      []  att     []  unatt     []  w/US     []  w/ice    []  w/heat    min []  Ultrasound, settings/location:      min []  Iontophoresis w/ dexamethasone, location:                                               []  take home patch       []  in clinic    min []  Ice     []  Heat    location/position:     min []  Paraffin:  location     min []  Vasopneumatic Device, press/temp:    8 min [x]  Other: Paraffin Right hand/wrist   [x] Skin assessment post-treatment (if applicable):    [x]  intact    []  redness- no adverse reaction     []redness  adverse reaction:        17 min Therapeutic Exercise:  [x]  See flow sheet   Rationale:      increase ROM and increase strength to improve the patients ability to . Right hand:    1\" peg removal from medium putty - 10/10 pegs.    Prehension- 1\" peg placement into resistive foam board   1\" peg removal with gripper 10# x35      10 min Neuromuscular Re-ed:    Rationale:    increase proprioception and scar desensitization to improve the patients ability to tolerate pressure on palm. Right hand:     Tbar rolls 2 minutes on right palm. Putty flatten tabletop with palm - medium putty    Issued scar gel sheet - reviewed wear schedule. Other Objective/Functional Measures:    Shortened session due to late arrival   Tolerated all pinch and  exercises without pain. Post Treatment Pain Level (on 0 to 10) scale:   0-1  / 10     ASSESSMENT  Assessment/Changes in Function: improving hand strength, improving ability to manage scar, improving ability to tolerate pressure on palm. []  See Progress Note/Recertification   Patient will continue to benefit from skilled OT services to address ROM deficits, address strength deficits, analyze and address soft tissue restrictions and analyze and modify body mechanics/ergonomics to attain remaining goals. Progress toward goals / Updated goals:     Patient will tolerate pressure on palm to complete ADLs/IADLs successfully  without discomfort   11/1/21: pt tolerated t-bar rolls and putty flatten with palm.       Patient will achieve at least 40# of  in right hand for return to light cooking  11/1/21: Tolerated gripper 10# for peg removal exercise.      Patient to be independent in scar management techniques  11/1/21: provided scar gel sheet.         PLAN  []  Upgrade activities as tolerated YES Continue plan of care   []  Discharge due to :    []  Other:      Therapist: ISIAH Green    Date: 11/1/2021 Time: 8:22 AM     Future Appointments   Date Time Provider Frankie Sosa   11/4/2021  3:30 PM [facility] OT  [address] [facility]   11/5/2021  3:15 PM Eric Myers DO [facility] BS AMB   11/8/2021  3:30 PM [facility] OT  [address] [facility]   11/9/2021  9:30 AM [facility] INFUSION CHAIR 2 [facility]   11/11/2021  3:30 PM [facility] OT  [address] [facility] 11/15/2021  3:30 PM [facility] OT  [address] [facility]   11/18/2021  3:30 PM [facility] OT  [address] [facility]   11/23/2021  8:00 AM [facility] OT  [address] [facility]   11/29/2021  8:00 AM [facility] OT  [address] [facility]

## 2021-11-04 ENCOUNTER — HOSPITAL ENCOUNTER (OUTPATIENT)
Dept: PHYSICAL THERAPY | Age: 51
Discharge: HOME | End: 2021-11-04
Payer: MEDICARE

## 2021-11-04 PROCEDURE — 97018 PARAFFIN BATH THERAPY: CPT

## 2021-11-04 PROCEDURE — 97110 THERAPEUTIC EXERCISES: CPT

## 2021-11-04 PROCEDURE — 97112 NEUROMUSCULAR REEDUCATION: CPT

## 2021-11-04 NOTE — PROGRESS NOTES
OCCUPATIONAL THERAPY - DAILY TREATMENT NOTE    Patient Name: Timothy Portal        Date: 2021  : 1970   YES Patient  Verified  Visit #:   2   of   8  Insurance: Payor: J Carlos Huynh / Plan: [facility] [address] [facility] CCP / Product Type: Managed Care Medicare /      In time: 3:31 Out time: 4:13   Total Treatment Time: 42     Medicare/BCBS Time Tracking (below)   Total Timed Codes (min):  32 1:1 Treatment Time:  42     TREATMENT AREA =  Right Wrist    SUBJECTIVE    Pain Level (on 0 to 10 scale):  2  / 10   Medication Changes/New allergies or changes in medical history, any new surgeries or procedures? NO    If yes, update Summary List   Subjective Functional Status/Changes:  []  No changes reported     \"Hand is a little aggravated today\"          OBJECTIVE:    Modalities Rationale:     decrease edema, decrease inflammation, decrease pain and increase tissue extensibility to improve patient's ability to .                min []? Estim, type/location:                                                            []?  att     []?  unatt     []?  w/US     []?  w/ice    []?  w/heat     min []? Ultrasound, settings/location:        min []? Iontophoresis w/ dexamethasone, location:                                                []?  take home patch       []? in clinic     min []? Ice     []? Heat    location/position:       min []? Paraffin:  location       min []? Vasopneumatic Device, press/temp:     10 min [x]? Other: Paraffin Right hand/wrist   [x]? Skin assessment post-treatment (if applicable):    [x]? intact    []? redness- no adverse reaction     []? redness - adverse reaction:        24 min Therapeutic Exercise:  [x]  See flow sheet   Rationale:      increase ROM and increase strength to improve the patients ability to . Right hand/wrist:     Red [employer] 3 ways x10 each.    1# dumbbell wrist flex/ext x10 each  Prehension- 1\" peg placement into resistive foam board   Saadia Sequeira Rolls table top   [employer] stretch - small hammer sup/pro    8 min Neuromuscular Re-ed:    Rationale:   increase proprioception and scar desensitization to improve the patients ability to tolerate pressure on palm.      Right hand:      Tbar rolls 2 minutes on right palm. Scrubbing tabletop        min Patient Education:  YES  Reviewed HEP   [x]  Progressed/Changed HEP based on:   Reviewed gentle weightbearing on right palm. Other Objective/Functional Measures:    Completed al hand strengthening without pain   Tolerated gentle pressure on palm without pain. Post Treatment Pain Level (on 0 to 10) scale:   2/ 10     ASSESSMENT  Assessment/Changes in Function: progressing hand strength, decreased pain levels reported with constant use. []  See Progress Note/Recertification   Patient will continue to benefit from skilled OT services to address ROM deficits, address strength deficits, analyze and address soft tissue restrictions and analyze and modify body mechanics/ergonomics to attain remaining goals. Progress toward goals / Updated goals:     Patient will tolerate pressure on palm to complete ADLs/IADLs successfully  without discomfort   11/1/21: pt tolerated t-bar rolls and putty flatten with palm.       Patient will achieve at least 40# of  in right hand for return to light cooking  11/1/21: Tolerated gripper 10# for peg removal exercise.      Patient to be independent in scar management techniques  11/1/21: provided scar gel sheet.       PLAN  []  Upgrade activities as tolerated YES Continue plan of care   []  Discharge due to :    []  Other:      Therapist: ISIAH Milian    Date: 11/4/2021 Time: 3:31 PM     Future Appointments   Date Time Provider Frankie Sosa   11/5/2021  3:15 PM DO KEV Mark BS AMB   11/8/2021  3:30 PM [facility] OT  [address] [facility]   11/9/2021  9:30 AM [address] [place] [address] INFUSION CHAIR 2 [facility]   11/11/2021  3:30 PM [facility] OT  [address] [facility] 11/15/2021  3:30 PM [facility] OT  [address] [facility]   11/18/2021  3:30 PM [facility] OT  [address] [facility]   11/23/2021  8:00 AM [facility] OT  [address] [facility]   11/29/2021  8:00 AM [facility] OT  [address] [facility]

## 2021-11-05 ENCOUNTER — OFFICE VISIT (OUTPATIENT)
Dept: ORTHOPEDIC SURGERY | Age: 51
End: 2021-11-05
Payer: MEDICARE

## 2021-11-05 VITALS
WEIGHT: 158 LBS | TEMPERATURE: 96.8 F | HEIGHT: 58 IN | HEART RATE: 92 BPM | BODY MASS INDEX: 33.17 KG/M2 | OXYGEN SATURATION: 97 % | RESPIRATION RATE: 18 BRPM

## 2021-11-05 DIAGNOSIS — G56.01 CARPAL TUNNEL SYNDROME OF RIGHT WRIST: Primary | ICD-10-CM

## 2021-11-05 DIAGNOSIS — Z98.890 S/P CARPAL TUNNEL RELEASE: ICD-10-CM

## 2021-11-05 PROCEDURE — 99024 POSTOP FOLLOW-UP VISIT: CPT | Performed by: ORTHOPAEDIC SURGERY

## 2021-11-05 NOTE — PROGRESS NOTES
Barb Zhao is a 46 y.o. female right handed individual, not currently working. Worker's Compensation and legal considerations: not known. Vitals:    11/05/21 1506   Pulse: 92   Resp: 18   Temp: 96.8 °F (36 °C)   TempSrc: Temporal   SpO2: 97%   Weight: 158 lb (71.7 kg)   Height: 4' 10\" (1.473 m)   PainSc:   2   PainLoc: Wrist           Chief Complaint   Patient presents with    Wrist Pain     right wrist pain       HPI: Patient returns today 6 weeks status post right carpal tunnel release. She has been in therapy and reports to be doing well. 2 weeks HPI: Patient presents today 2 weeks status post right carpal tunnel release. She reports some opening at her incision. She denies any pain and reports the numbness to be completely resolved. Preop HPI: Patient returns today for follow-up after receiving right carpal tunnel injection. She reports it working for about a week and then wearing off and the numbness coming back. Initial HPI: Patient presents with + EMG for carpal tunnel on right. She has a history of fibromyalgia. She is requesting to just have it \"fixed\" and does not thing a steroid injection will work.     Date of onset:  indeterminate    Injury: No    Prior Treatment:  Yes: Comment:  right carpal tunnel release    Numbness/ Tingling: Yes: Comment: Right hand      ROS: Review of Systems - General ROS: negative  Psychological ROS: negative  ENT ROS: negative  Allergy and Immunology ROS: negative  Hematological and Lymphatic ROS: negative  Respiratory ROS: no cough, shortness of breath, or wheezing  Cardiovascular ROS: no chest pain or dyspnea on exertion  Gastrointestinal ROS: no abdominal pain, change in bowel habits, or black or bloody stools  Musculoskeletal ROS: negative  Neurological ROS: positive for - numbness/tingling  Dermatological ROS: negative    Past Medical History:   Diagnosis Date    Anxiety     Asthma     Controlled    Bipolar 1 disorder (HCC)     [facility]     bladder CA, cervical CA, skin CA    Cervical ca ([facility] 1984    CTS (carpal tunnel syndrome), right, mild by EMG/NCV 2021    DVT (deep venous thrombosis) (HCC)     x1    Eczema     Fibromyalgia     Hiatal hernia     History of bladder cancer     Nocturia     JOSE (obstructive sleep apnea)     didn't like CPAP    PE (pulmonary thromboembolism) [facility]  , 2012    x 3    Pneumonia     Right wrist pain     Squamous cell carcinoma of left lower leg     Calf and thigh    TIA (transient ischemic attack)     Uterine cancer ([facility]        Past Surgical History:   Procedure Laterality Date    HX APPENDECTOMY      HX  SECTION      HX HYSTERECTOMY      HX OTHER SURGICAL      skin CA lesions removed L calf, L thigh with grafting    MN CYSTOTOMY,EXCIS BLADDER TUMOR         Current Outpatient Medications   Medication Sig Dispense Refill    fluticasone propion-salmeteroL (Advair Diskus) 250-50 mcg/dose diskus inhaler Take 1 Puff by inhalation every twelve (12) hours.  fluticasone propionate (Flonase Allergy Relief) 50 mcg/actuation nasal spray 2 Sprays by Both Nostrils route daily as needed.  calcium carbonate (TUMS) 200 mg calcium (500 mg) chew Take 2 Tablets by mouth daily as needed.  diphenhydrAMINE (Benadryl Allergy) 25 mg tablet Take 25 mg by mouth every six (6) hours as needed for Itching or Allergies.  cyclobenzaprine (FLEXERIL) 10 mg tablet Take 10 mg by mouth three (3) times daily as needed.  FLUoxetine (PROzac) 20 mg capsule TAKE 1 CAPSULE BY MOUTH EVERY DAY      ramelteon (ROZEREM) 8 mg tablet Take  by mouth nightly.  omalizumab (XOLAIR) 150 mg solr 375 mg by SubCUTAneous route every fourteen (14) days.  dupilumab (DUPIXENT) 300 mg/2 mL syrg syringe 300 mg by SubCUTAneous route every fourteen (14) days.  melatonin 10 mg tab Take 1 Tablet by mouth nightly.       EPINEPHrine (EPIPEN) 0.3 mg/0.3 mL injection 0.3 mg by IntraMUSCular route once as needed.  LORazepam (ATIVAN) 1 mg tablet Take 1 Tab by mouth two (2) times a day. Max Daily Amount: 2 mg. (Patient taking differently: Take 1 mg by mouth three (3) times daily.) 60 Tab 0    albuterol (PROVENTIL HFA, VENTOLIN HFA, PROAIR HFA) 90 mcg/actuation inhaler Take 2 Puffs by inhalation every four (4) hours as needed. Facility-Administered Medications Ordered in Other Visits   Medication Dose Route Frequency Provider Last Rate Last Admin    [START ON 11/9/2021] omalizumab (XOLAIR) SQ syringe 75 mg  75 mg SubCUTAneous ONCE Slick Hassan MD        [facility] ON 11/9/2021] omalizumab Hao Lacer) SQ syringe 150 mg  150 mg SubCUTAneous ONCE Slick Hassan MD       [facility] ON 11/9/2021] omalizumab Hao Lacer) SQ syringe 150 mg  150 mg SubCUTAneous ONCE Slick Hassan MD           Allergies   Allergen Reactions    Pcn [Penicillins] Anaphylaxis    Strawberry Anaphylaxis    Sulfur Anaphylaxis    Adhesive Tape-Silicones Hives     Patient stated tegaderm and paper tape okay    Depakote [Divalproex] Other (comments)     Paralysis      Doxepin Other (comments)     Agitated    Keflex [Cephalexin] Rash    Levaquin [Levofloxacin] Nausea and Vomiting    Seroquel [Quetiapine] Rash    Sulfa (Sulfonamide Antibiotics) Nausea and Vomiting     Magnesium Sulfate    Thorazine [Chlorpromazine] Other (comments)     Agitated      Toradol [Ketorolac] Seizures    Tramadol Seizures           PE:     Physical Exam  Vitals and nursing note reviewed. Constitutional:       General: She is not in acute distress. Appearance: Normal appearance. She is not ill-appearing. Cardiovascular:      Pulses: Normal pulses. Pulmonary:      Effort: Pulmonary effort is normal. No respiratory distress. Musculoskeletal:         General: No swelling, tenderness, deformity or signs of injury. Normal range of motion. Cervical back: Normal range of motion and neck supple.       Right lower leg: No edema. Left lower leg: No edema. Skin:     General: Skin is warm and dry. Capillary Refill: Capillary refill takes less than 2 seconds. Findings: No bruising or erythema. Neurological:      General: No focal deficit present. Mental Status: She is alert and oriented to person, place, and time. Cranial Nerves: No cranial nerve deficit. Sensory: No sensory deficit. Psychiatric:         Mood and Affect: Mood normal.         Behavior: Behavior normal.            Right hand: Incision healed. Neurovascularly intact distally. Range of motion full. NCV & EMG Findings:     Evaluation of the right median sensory nerve showed prolonged distal peak latency (4.1 ms) and decreased conduction velocity (Wrist-2nd Digit, 34 m/s). All remaining nerves (as indicated in the following tables) were within normal limits.       All examined muscles (as indicated in the following table) showed no evidence of electrical instability.       INTERPRETATION  This is an abnormal electrodiagnostic examination. These findings may be consistent with:   1. Mild median mononeuropathy at the right wrist (carpal tunnel syndrome)     There is no electrodiagnostic evidence of any cervical radiculopathy, brachial plexopathy, peripheral polyneuropathy, or any other mononeuropathy.     CLINICAL INTERPRETATION     Her electrodiagnostic finding of carpal tunnel syndrome is consistent with her right hand symptoms. Imaging:     None indicated        ICD-10-CM ICD-9-CM    1. Carpal tunnel syndrome of right wrist  G56.01 354.0    2. S/P carpal tunnel release  Z98.890 V45.89          Plan:     Continue range of motion exercises and scar care. Follow-up and Dispositions    · Return if symptoms worsen or fail to improve.           Plan was reviewed with patient, who verbalized agreement and understanding of the plan

## 2021-11-08 ENCOUNTER — HOSPITAL ENCOUNTER (OUTPATIENT)
Dept: PHYSICAL THERAPY | Age: 51
Discharge: HOME | End: 2021-11-08
Payer: MEDICARE

## 2021-11-08 PROCEDURE — 97110 THERAPEUTIC EXERCISES: CPT

## 2021-11-08 PROCEDURE — 97112 NEUROMUSCULAR REEDUCATION: CPT

## 2021-11-08 PROCEDURE — 97018 PARAFFIN BATH THERAPY: CPT

## 2021-11-08 NOTE — PROGRESS NOTES
OCCUPATIONAL THERAPY - DAILY TREATMENT NOTE    Patient Name: Kelly Rivas        Date: 2021  : 1970   YES Patient  Verified  Visit #:   3   of   8  Insurance: Payor: Soren Lee / Plan: [facility] C[facility] / Product Type: Managed Care Medicare /      In time: 3:30 Out time: 4:12   Total Treatment Time: 42     Medicare/BCBS Time Tracking (below)   Total Timed Codes (min):  32 1:1 Treatment Time:  42     TREATMENT AREA =  Right wrist     SUBJECTIVE     Pain Level (on 0 to 10 scale):  4   10   Medication Changes/New allergies or changes in medical history, any new surgeries or procedures? NO    If yes, update Summary List   Subjective Functional Status/Changes:  []  No changes reported     \"My wrist hurts more\"  \"I may be using my right hand too much at work\"        OBJECTIVE    Modalities Rationale:     decrease edema, decrease inflammation, decrease pain and increase tissue extensibility to improve patient's ability to .                         min []? ? Estim, type/location:                                                            []? ?  att     []? ?  unatt     []? ?  w/US     []? ?  w/ice    []? ?  w/heat     min []? ?  Ultrasound, settings/location:        min []? ?  Iontophoresis w/ dexamethasone, location:                                                []? ?  take home patch       []? ?[address] [place] [address]     min []? ?  Ice     []? ?  Heat    location/position:       min []? ?  Paraffin:  location       min []? ?  Vasopneumatic Device, press/temp:     10 min [x]? ?  Other: Paraffin Right hand/wrist   [x]? ? Skin assessment post-treatment (if applicable):    [x]? ?  intact    []? ?  redness- no adverse reaction     []? ?redness - adverse reaction:          22 min Therapeutic Exercise:  [x]  See flow sheet   Rationale:      increase ROM and increase strength to improve the patients ability to . Right hand/wrist:     Wrist Maze x 2 minutes    Red Therabar 3 ways x10 each.    1# dumbbell wrist flex/ext , sup/pro, rd/ud x12 each  Red Digiflex - full  x15   Red/yellow resistive clothespin with sponge pieces all pinches. 10 min Neuromuscular Re-ed:    Rationale:   increase proprioception and scar desensitization to improve the patients ability to tolerate pressure on palm.      Right hand:      Tbar rolls 2 minutes on right palm. Power web palm press   Towel slides on wall with a two hand push up x15      min Patient Education:  YES  Reviewed HEP   [x]  Progressed/Changed HEP based on:   Reviewed wrist strengthening HEP. Other Objective/Functional Measures: Tolerated gentle wrist strengthening without pain. Tolerated tip and 3pt pinch strength. Post Treatment Pain Level (on 0 to 10) scale:   4  / 10     ASSESSMENT  Assessment/Changes in Function: progressing hand strength, improved ability to tolerate pressure on palm, increased pain in wrist with constant use during clinic activities. []  See Progress Note/Recertification   Patient will continue to benefit from skilled OT services to address ROM deficits, address strength deficits, analyze and address soft tissue restrictions and analyze and modify body mechanics/ergonomics to attain remaining goals. Progress toward goals / Updated goals:    Patient will tolerate pressure on palm to complete ADLs/IADLs successfully  without discomfort   11/1/21: pt tolerated t-bar rolls and putty flatten with palm.    11/8/21: tolerated power web palm press.      Patient will achieve at least 40# of  in right hand for return to light cooking  11/1/21:  Tolerated gripper 10# for peg removal exercise.      Patient to be independent in scar management techniques  11/1/21: provided scar gel sheet.      PLAN  []  Upgrade activities as tolerated YES Continue plan of care   []  Discharge due to :    []  Other:      Therapist: ISIAH Ramires    Date: 11/8/2021 Time: 12:40 PM     Future Appointments   Date Time Provider Department Center   11/8/2021  3:30 PM [facility] OT  [address] [facility]   11/9/2021  1:30  [facility] INFUSION CHAIR 8 [facility]   11/11/2021  3:30 PM [facility] OT  [address] [facility]   11/15/2021  3:30 PM [facility] OT  [address] [facility]   11/18/2021  3:30 PM [facility] OT  [address] [facility]   11/23/2021  8:00 AM [facility] OT  [address] [facility]   11/29/2021  8:00 AM [facility] OT  [address] [facility]

## 2021-11-11 ENCOUNTER — HOSPITAL ENCOUNTER (OUTPATIENT)
Dept: PHYSICAL THERAPY | Age: 51
Discharge: HOME | End: 2021-11-11
Payer: MEDICARE

## 2021-11-11 PROCEDURE — 97110 THERAPEUTIC EXERCISES: CPT

## 2021-11-11 PROCEDURE — 97018 PARAFFIN BATH THERAPY: CPT

## 2021-11-11 PROCEDURE — 97112 NEUROMUSCULAR REEDUCATION: CPT

## 2021-11-11 NOTE — PROGRESS NOTES
OCCUPATIONAL THERAPY - DAILY TREATMENT NOTE    Patient Name: Julio Terrazas        Date: 2021  : 1970   YES Patient  Verified  Visit #:   4   of   8  Insurance: Payor: Michell Pittman / Plan: [facility] C[facility] / Product Type: Managed Care Medicare /      In time: 8:45 Out time: 9:28   Total Treatment Time: 43     Medicare/BCBS Time Tracking (below)   Total Timed Codes (min):  33 1:1 Treatment Time:  43     TREATMENT AREA =  Right wrist     SUBJECTIVE    Pain Level (on 0 to 10 scale):  2 / 10   Medication Changes/New allergies or changes in medical history, any new surgeries or procedures? NO    If yes, update Summary List   Subjective Functional Status/Changes:  []  No changes reported     \"I have been working a lot\"   \"I have been wearing my wrist brace at work\"        OBJECTIVE    Modalities Rationale:     decrease inflammation, decrease pain and increase tissue extensibility to improve patient's ability to .    min [] Estim, type/location:                                      []  att     []  unatt     []  w/US     []  w/ice    []  w/heat    min []  Ultrasound, settings/location:      min []  Iontophoresis w/ dexamethasone, location:                                               []  take home patch       []  in clinic    min []  Ice     []  Heat    location/position:    10 min [x]  Paraffin:  location Right hand/wrist       min []  Vasopneumatic Device, press/temp:     min []  Other:    [x] Skin assessment post-treatment (if applicable):    [x]  intact    []  redness- no adverse reaction     []redness - adverse reaction:        25 min Therapeutic Exercise:  [x]  See flow sheet   Rationale:      increase ROM and increase strength to improve the patients ability to .      Right hand/wrist:     Red t-bar 3 ways x15 each   Red resistive clothespin with sponge pieces all pinches   Medium putty removing 1/\" pegs 10/10 pegs located  2# dumbbell wrist flex/ext, sup/pro, UD/RD x 10 each     8 min Neuromuscular Re-ed:    Rationale:    increase proprioception and tolerate pressure on palm  to improve the patients ability to participate in functional daily activities. Right hand:     T-bar rolls on palm    Putty flatten tabletop on palm. min Patient Education:  YES  Reviewed HEP   [x]  Progressed/Changed HEP based on:   Upgraded putty HEP     Other Objective/Functional Measures:    Hand Strength: Gross Grasp   Right  23 (+1)      Left 40         Post Treatment Pain Level (on 0 to 10) scale:   2  / 10     ASSESSMENT  Assessment/Changes in Function: improving hand strength, tolerates pressure on right palm without discomfort, no increase in pain levels with strengthening exercises, patient familiar with strategies to reduce pain and discomfort in right wrist with heat modalities and wrist supports for work tasks. []  See Progress Note/Recertification   Patient will continue to benefit from skilled OT services to address ROM deficits, address strength deficits, analyze and address soft tissue restrictions and analyze and modify body mechanics/ergonomics to attain remaining goals. Progress toward goals / Updated goals:    Patient will tolerate pressure on palm to complete ADLs/IADLs successfully  without discomfort   11/1/21: pt tolerated t-bar rolls and putty flatten with palm.    11/8/21: tolerated power web palm press. 11/11/21: Goal met.      Patient will achieve at least 40# of  in right hand for return to light cooking  11/1/21: Tolerated gripper 10# for peg removal exercise.   11/11/21: 23# (+1) .      Patient to be independent in scar management techniques  11/1/21: provided scar gel sheet.   11/11/21: Rennie Bumpers.       PLAN  []  Upgrade activities as tolerated YES Continue plan of care   []  Discharge due to :    []  Other:      Therapist: ISIAH Montgomery    Date: 11/11/2021 Time: 8:49 AM     Future Appointments   Date Time Provider Frankie Sosa 11/15/2021  3:30 PM [facility] OT  [address] [facility]   11/18/2021  3:30 PM [facility] OT  [address] [facility]   11/23/2021  8:00 AM [facility] OT  [address] [facility]   11/23/2021 11:00  [facility] INFUSION CHAIR 8 [facility]   11/29/2021  8:00 AM [facility] OT  [address] [facility]   12/10/2021  9:00  [facility] INFUSION CHAIR 2 [facility]

## 2021-11-15 ENCOUNTER — HOSPITAL ENCOUNTER (OUTPATIENT)
Dept: PHYSICAL THERAPY | Age: 51
Discharge: HOME | End: 2021-11-15
Payer: MEDICARE

## 2021-11-15 PROCEDURE — 97018 PARAFFIN BATH THERAPY: CPT

## 2021-11-15 PROCEDURE — 97110 THERAPEUTIC EXERCISES: CPT

## 2021-11-15 NOTE — PROGRESS NOTES
OCCUPATIONAL THERAPY - DAILY TREATMENT NOTE    Patient Name: Lilian Pradhan        Date: 11/15/2021  : 1970   YES Patient  Verified  Visit #:   5   of   8  Insurance: Payor: Sharita Murcia / Plan: [facility] CCP / Product Type: Managed Care Medicare /      In time: 11:47  Out time: 12:28   Total Treatment Time: 41     Medicare/BCBS Time Tracking (below)   Total Timed Codes (min):  31 1:1 Treatment Time:  41     TREATMENT AREA =  Right wrist    SUBJECTIVE    Pain Level (on 0 to 10 scale):  6  10   Medication Changes/New allergies or changes in medical history, any new surgeries or procedures? YES    If yes, update Summary List   Subjective Functional Status/Changes:  []  No changes reported     \"My  feels okay, but my wrist feels week\"    \"I may have picked up things at work that may have been too heavy\"   \"I feel sore on my wrist like it's a bruise\"       OBJECTIVE    Modalities Rationale:     decrease inflammation, decrease pain and increase tissue extensibility to improve patient's ability to .                min []? Estim, type/location:                                                            []?  att     []?  unatt     []?  w/US     []?  w/ice    []?  w/heat     min []? Ultrasound, settings/location:        min []? Iontophoresis w/ dexamethasone, location:                                                []?  take home patch       []? in clinic    8  concurrent with paraffin  min []? Ice     [x]? Heat    location/position:  right wrist      10 min [x]? Paraffin:  location Right hand/wrist         min []? Vasopneumatic Device, press/temp:       min []? Other:     [x]? Skin assessment post-treatment (if applicable):    [x]? intact    []? redness- no adverse reaction     []? redness - adverse reaction:             31 min Therapeutic Exercise:  [x]  See flow sheet   Rationale:      increase ROM and increase strength to improve the patients ability to pinch and .     Right hand/wrist:     Wrist maze   2# dumbbell wrist flex/ext, UD/RD x 10 each   OrangeTband exercises- wrist flexion/ext ,UD/RD  1\" peg placement into resistive pegboard  Peg removal with 10# gripper x25        min Patient Education:  YES  Reviewed HEP   [x]  Progressed/Changed HEP based on:   Initiated t-band wrist strengthening exercises. Other Objective/Functional Measures: Tolerated all wrist strengthening exercises without pain  Tolerated all  strengthening exercise     Post Treatment Pain Level (on 0 to 10) scale:   2  / 10     ASSESSMENT  Assessment/Changes in Function: improving pinch and  strength, no increase in pain levels with constant use, independent with symptom management. []  See Progress Note/Recertification   Patient will continue to benefit from skilled OT services to address ROM deficits, address strength deficits, analyze and address soft tissue restrictions and analyze and modify body mechanics/ergonomics to attain remaining goals. Progress toward goals / Updated goals:    Patient will tolerate pressure on palm to complete ADLs/IADLs successfully  without discomfort   11/1/21: pt tolerated t-bar rolls and putty flatten with palm.    11/8/21: tolerated power web palm press.   11/11/21: Goal met.      Patient will achieve at least 40# of  in right hand for return to light cooking  11/1/21: Tolerated gripper 10# for peg removal exercise.   11/11/21: 23# (+1) .      Patient to be independent in scar management techniques  11/1/21: provided scar gel sheet.   11/11/21: Addie Quick. Goal met.       PLAN  []  Upgrade activities as tolerated YES Continue plan of care   []  Discharge due to :    []  Other:      Therapist: ISIAH Hunt    Date: 11/15/2021 Time: 8:04 AM     Future Appointments   Date Time Provider Frankie Sosa   11/15/2021 11:45 AM [facility] OT  [address] [facility]   11/18/2021  3:30 PM [facility] OT  [address] [facility]   11/23/2021 8:00 AM [address] OT  [address]   11/23/2021 11:00 AM [facility] INFUSION CHAIR 8 [facility]   11/29/2021  8:00 AM [address] OT  [address]   12/10/2021  9:00 AM [facility] INFUSION CHAIR 2 [facility]

## 2021-11-18 ENCOUNTER — HOSPITAL ENCOUNTER (OUTPATIENT)
Dept: PHYSICAL THERAPY | Age: 51
Discharge: HOME | End: 2021-11-18
Payer: MEDICARE

## 2021-11-18 PROCEDURE — 97110 THERAPEUTIC EXERCISES: CPT

## 2021-11-18 PROCEDURE — 97018 PARAFFIN BATH THERAPY: CPT

## 2021-11-18 NOTE — PROGRESS NOTES
OCCUPATIONAL THERAPY - DAILY TREATMENT NOTE    Patient Name: Samantha Whitfield        Date: 2021  : 1970   YES Patient  Verified  Visit #:   6   of   8  Insurance: Payor: Antonette Reyes / Plan: [facility] CCP / Product Type: Managed Care Medicare /      In time: 8:45 Out time: 9:26   Total Treatment Time: 41     Medicare/BCBS Time Tracking (below)   Total Timed Codes (min):  31 1:1 Treatment Time:  41     TREATMENT AREA =  Right wrist     SUBJECTIVE    Pain Level (on 0 to 10 scale):  0  / 10   Medication Changes/New allergies or changes in medical history, any new surgeries or procedures? NO    If yes, update Summary List   Subjective Functional Status/Changes:  []  No changes reported     \"I am not feeling well today, I was in the hospital yesterday for my liver\"  \"My hand and wrist are feeling okay today\"        OBJECTIVE    Modalities Rationale:     decrease inflammation, decrease pain and increase tissue extensibility to improve patient's ability to .                         min []? ? Estim, type/location:                                                            []? ?  att     []? ?  unatt     []? ?  w/US     []? ?  w/ice    []? ?  w/heat     min []? ?  Ultrasound, settings/location:        min []? ?  Iontophoresis w/ dexamethasone, location:                                                []? ?  take home patch       []? ?[address] [place] [address]    min []? ?  Ice     []? ?  Heat    location/position:        10 min [x]? ?  Paraffin:  location Right hand/wrist         min []? ?  Vasopneumatic Device, press/temp:       min []? ?  Other:     [x]? ? Skin assessment post-treatment (if applicable):    [x]? ?  intact    []? ?  redness- no adverse reaction     []? ?redness - adverse reaction:          31 min Therapeutic Exercise:  [x]  See flow sheet   Rationale:      increase ROM and increase strength to improve the patients ability to  and pinch.      Right hand/wrist:      Ball rolls tabletop   3# dumbbell wrist flex/ext, UD/RD, sup/pro x 10 each   Putty flatten- medium putty with L bar   Tbar red 3 ways x15 each  4# clothespins with sponges all pinches       min Patient Education:  YES  Reviewed HEP   [x]  Progressed/Changed HEP based on:   Finalized at home strengthening program.      Other Objective/Functional Measures:    Upgraded dumbbell to 3# - tolerated all wrist strengthening    Post Treatment Pain Level (on 0 to 10) scale:   2  / 10     ASSESSMENT  Assessment/Changes in Function: progressing right UE strength, decreasing pain levels with constant use of right hand and wrist, finalizing HEP's for discharge within next 2 visits. []  See Progress Note/Recertification   Patient will continue to benefit from skilled OT services to address ROM deficits, address strength deficits and analyze and modify body mechanics/ergonomics to attain remaining goals. Progress toward goals / Updated goals:    Patient will tolerate pressure on palm to complete ADLs/IADLs successfully  without discomfort   11/1/21: pt tolerated t-bar rolls and putty flatten with palm.    11/8/21: tolerated power web palm press.   11/11/21: Goal met.      Patient will achieve at least 40# of  in right hand for return to light cooking  11/1/21: Tolerated gripper 10# for peg removal exercise.   11/11/21: 23# (+1) .      Patient to be independent in scar management techniques  11/1/21: provided scar gel sheet.   11/11/21: Independent. Goal met.       PLAN  []  Upgrade activities as tolerated YES Continue plan of care   []  Discharge due to :    []  Other:      Therapist: ISIAH Wynne    Date: 11/18/2021 Time: 8:08 AM     Future Appointments   Date Time Provider Frankie Sosa   11/18/2021  8:45 AM [address] OT  [address]Jud[address] Mehta   11/23/2021  8:00 AM [address] OT  [address] Mehta   11/23/2021 11:00 AM [address]   11/29/2021  8:00 AM [address] OT  [address] Chemann Mehta   12/10/2021  9:00 AM [address]

## 2021-11-23 ENCOUNTER — APPOINTMENT (OUTPATIENT)
Dept: PHYSICAL THERAPY | Age: 51
End: 2021-11-23
Payer: MEDICARE

## 2021-11-29 ENCOUNTER — APPOINTMENT (OUTPATIENT)
Dept: PHYSICAL THERAPY | Age: 51
End: 2021-11-29
Payer: MEDICARE

## 2021-12-02 ENCOUNTER — TELEPHONE (OUTPATIENT)
Dept: PHYSICAL THERAPY | Age: 51
End: 2021-12-02

## 2021-12-29 NOTE — PROGRESS NOTES
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 1. Patient will tolerate pressure on palm to complete ADLs/IADLs successfully  without discomfort    Status at last Eval: tolerated power web palm press. Current Status: Goal met (21) yes   2. Patient will achieve at least 40# of  in right hand for return to light cooking   Status at last Eval: Tolerated gripper 10# for peg removal Current Status: 23# (+1) for  (21) no   3. Patient to be independent in scar management techniques   Status at last Eval: Provided scar gel sheet  Current Status: Independent (21) yes     Key Functional Changes/Progress: progressing right UE strength, decreasing pain levels with constant use of right hand and wrist, finalizing HEP's for discharge. Assessments/Recommendations: Other: d/c from occupational therapy, pt contacted and requested self d/c  If you have any questions/comments please contact us directly at [phone]. Thank you for allowing us to assist in the care of your patient.     Therapist Signature: VINH Donovan/MONSTER Date: 2021   Reporting Period: 10/26/2021 - 2021 Time: 11: AM       NOTE TO PHYSICIAN:  PLEASE COMPLETE THE ORDERS BELOW AND FAX TO   [facility]: [phone]. If you are unable to process this request in 24 hours please contact our office: [phone].    ___ I have read the above report and request that my patient be discharged from therapy.      Physician Signature:        Date:       Time:                                       Coni Huynh DO

## 2022-03-18 PROBLEM — Z85.89 HISTORY OF SQUAMOUS CELL CARCINOMA: Status: ACTIVE | Noted: 2018-04-04

## 2022-03-18 PROBLEM — J45.909 MODERATE ASTHMA WITHOUT COMPLICATION: Status: ACTIVE | Noted: 2018-04-04

## 2022-03-18 PROBLEM — M79.7 FIBROMYALGIA: Status: ACTIVE | Noted: 2018-04-04

## 2022-03-18 PROBLEM — Z85.41 HISTORY OF CERVICAL CANCER: Status: ACTIVE | Noted: 2018-04-04

## 2022-03-19 PROBLEM — M54.2 CERVICALGIA: Status: ACTIVE | Noted: 2018-05-02

## 2022-03-19 PROBLEM — G89.29 CHRONIC BILATERAL LOW BACK PAIN WITHOUT SCIATICA: Status: ACTIVE | Noted: 2018-05-02

## 2022-03-19 PROBLEM — Z85.42 HISTORY OF UTERINE CANCER: Status: ACTIVE | Noted: 2018-04-04

## 2022-03-19 PROBLEM — Z85.51 HISTORY OF BLADDER CANCER: Status: ACTIVE | Noted: 2018-04-04

## 2022-03-19 PROBLEM — M54.50 CHRONIC BILATERAL LOW BACK PAIN WITHOUT SCIATICA: Status: ACTIVE | Noted: 2018-05-02

## 2022-03-20 PROBLEM — G56.01 RIGHT CARPAL TUNNEL SYNDROME: Status: ACTIVE | Noted: 2021-09-20

## 2022-03-20 PROBLEM — J45.901 ACUTE ASTHMA EXACERBATION: Status: ACTIVE | Noted: 2018-04-27

## 2022-03-20 PROBLEM — F41.9 ANXIETY: Status: ACTIVE | Noted: 2018-04-05

## 2022-03-20 PROBLEM — M79.18 MYOFASCIAL PAIN SYNDROME: Status: ACTIVE | Noted: 2018-05-02

## 2023-01-31 RX ORDER — LORAZEPAM 1 MG/1
1 TABLET ORAL 2 TIMES DAILY
COMMUNITY
Start: 2018-06-04

## 2023-01-31 RX ORDER — FLUTICASONE PROPIONATE AND SALMETEROL 250; 50 UG/1; UG/1
1 POWDER RESPIRATORY (INHALATION) EVERY 12 HOURS
COMMUNITY

## 2023-01-31 RX ORDER — ALBUTEROL SULFATE 90 UG/1
2 AEROSOL, METERED RESPIRATORY (INHALATION) EVERY 4 HOURS PRN
COMMUNITY

## 2023-01-31 RX ORDER — UREA 10 %
2 LOTION (ML) TOPICAL DAILY PRN
COMMUNITY

## 2023-01-31 RX ORDER — RAMELTEON 8 MG/1
TABLET ORAL
COMMUNITY

## 2023-01-31 RX ORDER — PHENOL 1.4 %
1 AEROSOL, SPRAY (ML) MUCOUS MEMBRANE
COMMUNITY

## 2023-01-31 RX ORDER — EPINEPHRINE 0.3 MG/.3ML
0.3 INJECTION SUBCUTANEOUS
COMMUNITY

## 2023-01-31 RX ORDER — FLUTICASONE PROPIONATE 50 MCG
2 SPRAY, SUSPENSION (ML) NASAL DAILY PRN
COMMUNITY

## 2023-01-31 RX ORDER — CYCLOBENZAPRINE HCL 10 MG
10 TABLET ORAL 3 TIMES DAILY PRN
COMMUNITY
Start: 2021-06-02

## 2023-01-31 RX ORDER — FLUOXETINE HYDROCHLORIDE 20 MG/1
CAPSULE ORAL
COMMUNITY
Start: 2021-04-06

## 2023-05-01 DIAGNOSIS — M54.50 LOW BACK PAIN, UNSPECIFIED BACK PAIN LATERALITY, UNSPECIFIED CHRONICITY, UNSPECIFIED WHETHER SCIATICA PRESENT: ICD-10-CM

## 2023-05-01 DIAGNOSIS — M79.7 FIBROMYALGIA: ICD-10-CM

## 2023-05-01 DIAGNOSIS — M54.16 LUMBAR NEURITIS: ICD-10-CM

## 2023-05-01 RX ORDER — NAPROXEN 500 MG/1
TABLET ORAL
Qty: 30 TABLET | Refills: 0 | OUTPATIENT
Start: 2023-05-01

## 2023-05-12 ENCOUNTER — HOSPITAL ENCOUNTER (OUTPATIENT)
Facility: HOSPITAL | Age: 53
Setting detail: RECURRING SERIES
Discharge: HOME | End: 2023-05-15
Payer: MEDICARE

## 2023-05-12 PROCEDURE — 97162 PT EVAL MOD COMPLEX 30 MIN: CPT

## 2023-05-15 ENCOUNTER — APPOINTMENT (OUTPATIENT)
Facility: HOSPITAL | Age: 53
End: 2023-05-15
Payer: MEDICARE

## 2023-05-16 ENCOUNTER — APPOINTMENT (OUTPATIENT)
Facility: HOSPITAL | Age: 53
End: 2023-05-16
Payer: MEDICARE

## 2023-05-23 ENCOUNTER — HOSPITAL ENCOUNTER (OUTPATIENT)
Facility: HOSPITAL | Age: 53
Setting detail: RECURRING SERIES
Discharge: HOME | End: 2023-05-26
Payer: MEDICARE

## 2023-05-23 PROCEDURE — 97112 NEUROMUSCULAR REEDUCATION: CPT

## 2023-05-23 PROCEDURE — 97110 THERAPEUTIC EXERCISES: CPT

## 2023-05-23 PROCEDURE — 97530 THERAPEUTIC ACTIVITIES: CPT

## 2023-05-23 NOTE — PROGRESS NOTES
PHYSICAL THERAPY - DAILY TREATMENT NOTE (updated )    Patient Name: Miguel Ng    Date: 2023    : 1970  Insurance: Payor: Keith Winters / Plan: [address] [place] [address] / Product Type: *No Product type* /      Patient  verified yes     Visit #   Current / Total 2 8   Time   In / Out 10:03 10:48   Pain   In / Out 8-9/10 7/10   Subjective Functional Status/Changes: \"I only got like 1 hour of sleep last night. \"   Changes to:  Meds, Allergies, Med Hx, Sx Hx? If yes, update Summary List no       TREATMENT AREA =  Other low back pain [M54.59]  Fibromyalgia [M79.7]  Radiculopathy, lumbar region [M54.16]    OBJECTIVE    Modalities Rationale:     decrease pain and increase tissue extensibility to improve patient's ability to progress to PLOF and address remaining functional goals. min [] Estim Unattended, type/location:                                      []  w/ice    []  w/heat    min [] Estim Attended, type/location:                                     []  w/US     []  w/ice    []  w/heat    []  TENS insruct      min []  Mechanical Traction: type/lbs                   []  pro   []  sup   []  int   []  cont    []  before manual    []  after manual    min []  Ultrasound, settings/location:      min []  Iontophoresis w/ dexamethasone, location:                                               []  take home patch       []  in clinic   10 min  unbill []  Ice     [x]  Heat    location/position: MHP to LB in prone    min []  Paraffin,  details:     min []  Vasopneumatic Device, press/temp:     min []  Farshad Ray / Latonia Kothari: If using vaso (only need to measure limb vaso being performed on)      pre-treatment girth :       post-treatment girth :       measured at (landmark location) :      min []  Other:    Skin assessment post-treatment (if applicable):    [x]  intact    []  redness- no adverse reaction                 []redness - adverse reaction:        Therapeutic Procedures:   Tx Min

## 2023-05-25 ENCOUNTER — HOSPITAL ENCOUNTER (OUTPATIENT)
Facility: HOSPITAL | Age: 53
Setting detail: RECURRING SERIES
Discharge: HOME | End: 2023-05-28
Payer: MEDICARE

## 2023-05-25 PROCEDURE — 97530 THERAPEUTIC ACTIVITIES: CPT

## 2023-05-25 PROCEDURE — 97110 THERAPEUTIC EXERCISES: CPT

## 2023-05-25 PROCEDURE — 97112 NEUROMUSCULAR REEDUCATION: CPT

## 2023-05-30 ENCOUNTER — HOSPITAL ENCOUNTER (OUTPATIENT)
Facility: HOSPITAL | Age: 53
Setting detail: RECURRING SERIES
Discharge: HOME | End: 2023-06-02
Payer: MEDICARE

## 2023-05-30 PROCEDURE — 97110 THERAPEUTIC EXERCISES: CPT

## 2023-05-30 PROCEDURE — 97112 NEUROMUSCULAR REEDUCATION: CPT

## 2023-05-30 PROCEDURE — 97530 THERAPEUTIC ACTIVITIES: CPT

## 2023-05-30 NOTE — PROGRESS NOTES
(timed):  improve balance, coordination, kinesthetic sense, posture, core stability and proprioception to improve patient's ability to develop conscious control of individual muscles and awareness of position of extremities in order to progress to PLOF and address remaining functional goals. (see flow sheet as applicable)     Details if applicable:  IRISH, prone press ups, prone TKE/glut set, NILE   39 41 MC BC Totals Reminder: bill using total billable min of TIMED therapeutic procedures (example: do not include dry needle or estim unattended, both untimed codes, in totals to left)  8-22 min = 1 unit; 23-37 min = 2 units; 38-52 min = 3 units; 53-67 min = 4 units; 68-82 min = 5 units   Total Total     [x]  Patient Education billed concurrently with other procedures   [x] Review HEP    [] Progressed/Changed HEP, detail:    [] Other detail:       Objective Information/Functional Measures/Assessment  Required min/Mod VCs + demo to perform proper technique and for safety with TE  Pt demonstrated minimal apprehension during movements without c/o increase p!      demos symmetrical WBIng with sit <>stand without UE assist from     REIL- reduced LBP to 5/10   Patient will continue to benefit from skilled PT / OT services to modify and progress therapeutic interventions, analyze and address functional mobility deficits, analyze and address ROM deficits, analyze and address strength deficits, analyze and address soft tissue restrictions, analyze and cue for proper movement patterns, analyze and modify for postural abnormalities, and instruct in home and community integration to address functional deficits and attain remaining goals. Progress toward goals / Updated goals:  []  See Progress Note/Recertification    Short Term Goals: To be accomplished in 2-3 weeks   Patient will be compliant with home exercise program (HEP) to promote gains with therapy. MET  Status at IE: no HEP issued due to insurance regulations  Current

## 2023-06-05 ENCOUNTER — HOSPITAL ENCOUNTER (OUTPATIENT)
Facility: HOSPITAL | Age: 53
Setting detail: RECURRING SERIES
Discharge: HOME | End: 2023-06-08
Payer: MEDICARE

## 2023-06-05 PROCEDURE — 97110 THERAPEUTIC EXERCISES: CPT

## 2023-06-05 PROCEDURE — 97112 NEUROMUSCULAR REEDUCATION: CPT

## 2023-06-05 PROCEDURE — 97530 THERAPEUTIC ACTIVITIES: CPT

## 2023-06-05 NOTE — PROGRESS NOTES
PHYSICAL THERAPY - DAILY TREATMENT NOTE (updated )    Patient Name: Donte Mendoza    Date: 2023    : 1970  Insurance: Payor: Joan Siddiqi / Plan: [address] [place] [address] / Product Type: *No Product type* /      Patient  verified yes     Visit #   Current / Total 5 8   Time   In / Out 1001 1053   Pain   In / Out 6/10 0/10   Subjective Functional Status/Changes: \"I didn't str this morning bc I was coming here. I will be better\"    Changes to:  Meds, Allergies, Med Hx, Sx Hx? If yes, update Summary List no       TREATMENT AREA =  Other low back pain [M54.59]  Fibromyalgia [M79.7]  Radiculopathy, lumbar region [M54.16]    OBJECTIVE    Modalities Rationale:     decrease pain and increase tissue extensibility to improve patient's ability to progress to PLOF and address remaining functional goals. 10 min  unbill []  Ice     [x]  Heat    location/position: to L/s Supine with wedge under B LEs     [x]Skin assessment post-treatment (if applicable):   [x]  intact    []  redness- no adverse reaction                 []redness - adverse reaction:        Therapeutic Procedures: Tx Min Billable or 1:1 Min (if diff from Tx Min) Procedure, Rationale, Specifics   22  10563 Therapeutic Exercise (timed):  increase ROM, strength, coordination, balance, and proprioception to improve patient's ability to progress to PLOF and address remaining functional goals. (see flow sheet as applicable)     Details if applicable:     10  14032 Therapeutic Activity (timed):  use of dynamic activities replicating functional movements to increase ROM, strength, coordination, balance, and proprioception in order to improve patient's ability to progress to PLOF and address remaining functional goals.   (see flow sheet as applicable)     Details if applicable:  bridge, sit to stand   10  45098 Neuromuscular Re-Education (timed):  improve balance, coordination, kinesthetic sense, posture, core stability and

## 2023-06-08 ENCOUNTER — OFFICE VISIT (OUTPATIENT)
Age: 53
End: 2023-06-08
Payer: MEDICARE

## 2023-06-08 VITALS
WEIGHT: 179.6 LBS | HEART RATE: 81 BPM | RESPIRATION RATE: 18 BRPM | TEMPERATURE: 97.3 F | BODY MASS INDEX: 37.7 KG/M2 | HEIGHT: 58 IN | OXYGEN SATURATION: 99 %

## 2023-06-08 DIAGNOSIS — M54.50 LOW BACK PAIN, UNSPECIFIED BACK PAIN LATERALITY, UNSPECIFIED CHRONICITY, UNSPECIFIED WHETHER SCIATICA PRESENT: Primary | ICD-10-CM

## 2023-06-08 DIAGNOSIS — M79.7 FIBROMYALGIA: ICD-10-CM

## 2023-06-08 DIAGNOSIS — M54.16 LUMBAR NEURITIS: ICD-10-CM

## 2023-06-08 PROCEDURE — 99214 OFFICE O/P EST MOD 30 MIN: CPT | Performed by: PHYSICAL MEDICINE & REHABILITATION

## 2023-06-08 RX ORDER — PANTOPRAZOLE SODIUM 40 MG/1
TABLET, DELAYED RELEASE ORAL
COMMUNITY
Start: 2023-05-10

## 2023-06-08 RX ORDER — DILTIAZEM HYDROCHLORIDE 180 MG/1
CAPSULE, EXTENDED RELEASE ORAL DAILY
COMMUNITY
Start: 2023-05-20

## 2023-06-08 NOTE — PROGRESS NOTES
CONSTITUTIONAL: NAD, A&O x 3  SENSATION:  Sensation is intact to light touch throughout. MOTOR:  Straight Leg Raise: Negative, bilateral    Ambulates without an assistive device     Hip Flex Knee Ext Knee Flex Ankle DF GTE Ankle PF Tone   Right +4/5 +4/5 +4/5 +4/5 +4/5 +4/5 +4/5   Left +4/5 +4/5 +4/5 +4/5 +4/5 +4/5 +4/5       ASSESSMENT   Stephanie was seen today for back pain. Diagnoses and all orders for this visit:    Low back pain, unspecified back pain laterality, unspecified chronicity, unspecified whether sciatica present    Fibromyalgia    Lumbar neuritis          IMPRESSION AND PLAN:  Patient returns to the office today with c/o low back pain radiating into the BLE(L=R) in a circumferential distribution to the feet. Multiple treatment options were discussed. I will order a L spine MRI. I advised patient to bring copies of films to next visit. I recommended she continue with PT and start to perform her HEP when she is done. Patient is neurologically intact. I will see the patient back after the MRI or earlier if needed. Written by Velia Heath, as dictated by Regla Grimes MD  I examined the patient, reviewed and agree with the note.

## 2023-06-13 ENCOUNTER — APPOINTMENT (OUTPATIENT)
Facility: HOSPITAL | Age: 53
End: 2023-06-13
Payer: MEDICARE

## 2023-06-15 ENCOUNTER — APPOINTMENT (OUTPATIENT)
Facility: HOSPITAL | Age: 53
End: 2023-06-15
Payer: MEDICARE

## 2023-06-19 ENCOUNTER — APPOINTMENT (OUTPATIENT)
Facility: HOSPITAL | Age: 53
End: 2023-06-19
Payer: MEDICARE

## 2023-06-22 ENCOUNTER — APPOINTMENT (OUTPATIENT)
Facility: HOSPITAL | Age: 53
End: 2023-06-22
Payer: MEDICARE

## 2023-06-26 ENCOUNTER — TELEPHONE (OUTPATIENT)
Age: 53
End: 2023-06-26

## 2023-06-27 ENCOUNTER — APPOINTMENT (OUTPATIENT)
Facility: HOSPITAL | Age: 53
End: 2023-06-27
Payer: MEDICARE

## 2023-06-27 DIAGNOSIS — M54.16 LUMBAR NEURITIS: ICD-10-CM

## 2023-06-27 DIAGNOSIS — M54.50 LOW BACK PAIN, UNSPECIFIED BACK PAIN LATERALITY, UNSPECIFIED CHRONICITY, UNSPECIFIED WHETHER SCIATICA PRESENT: ICD-10-CM

## 2023-06-29 ENCOUNTER — APPOINTMENT (OUTPATIENT)
Facility: HOSPITAL | Age: 53
End: 2023-06-29
Payer: MEDICARE

## 2023-07-10 ENCOUNTER — HOSPITAL ENCOUNTER (OUTPATIENT)
Facility: HOSPITAL | Age: 53
Setting detail: RECURRING SERIES
Discharge: HOME | End: 2023-07-13
Payer: MEDICARE

## 2023-07-10 PROCEDURE — 97166 OT EVAL MOD COMPLEX 45 MIN: CPT

## 2023-07-10 NOTE — PROGRESS NOTES
OCCUPATIONAL THERAPY - DAILY TREATMENT NOTE (updated )    Patient Name: Allen Serum    Date: 7/10/2023    : 1970  Insurance: Payor: Nhung Ornelasry / Plan:  [address] / Product Type: *No Product type* /      Patient  verified yes     Visit #   Current / Total 1 8   Time   In / Out 9:41 10:13  32 mins   Pain   In / Out -8/10 8/10    Subjective Functional Status/Changes: \"I have a broken wrist I can't do anything. \"    \"I have been doing the exercises I did when I had carpal tunnel surgery. \"          TREATMENT AREA =  Pain in right wrist [M25.531]  Other intraarticular fracture of lower end of right radius, subsequent encounter for closed fracture with routine healing [S52.522C]    OBJECTIVE    Therapeutic Procedures: Tx Min Billable or 1:1 Min (if diff from Tx Min) Procedure, Rationale, Specifics   32  Evaluation      32  [facility] Totals Reminder: bill using total billable min of TIMED therapeutic procedures (example: do not include dry needle or estim unattended, both untimed codes, in totals to left)  8-22 min = 1 unit; 23-37 min = 2 units; 38-52 min = 3 units; 53-67 min = 4 units; 68-82 min = 5 units   Total Total     [x]  Patient Education billed concurrently with other procedures   [] Review HEP    [] Progressed/Changed HEP, detail:    [x] Other detail:   Pt ed on protocol for distal radius fx ORIF and informed on importance of following protocol to ensure proper recovery. Pt ed on ice modality safety/time/usage per day to aid in decreasing swelling. Pt ed on continuing to digit opposition, gentle digit flexion AROM, and tendon glides that she had been currently performing. Pt ed to only perform AROM with current exercises and to not perform passive range of motion yet. Pt ed on brace wear.      Objective Information/Functional Measures:      Objective:  Edema: Circumference    Right  Left   Styolid Level  16.6cm 14.4cm   MCP   17.0cm 16.0cm     Scar/Wound: size, color,
has decreased with use of ice modalities. Pt explains that she has currently been performing gentle exercises such as digit opposition, active digit flexion, and tendon glides. Pt reports that she is having increased difficulty with ADLs due to use of non dominant left hand. Pt reports that she is unable to \" a pen\" with the right hand. Pt explains that she is becoming more conscious of performing activities with the left hand vs right. Pt was provided on education and importance of following the protocol for exercise progression per dx. Pt would benefit from skilled occupational therapy services to decrease pain, decrease swelling, address activity modifications, increase ROM, strength, fine motor skills, and activity tolerance in order to improve performance in ADLs/IADLs.      -Objective measurements are as follows:   Objective:  Edema: Circumference           Right                Left              Styolid Level                16.6cm            14.4cm              MCP                            17.0cm            16.0cm                Scar/Wound: size, color, drainage, adhesions, location: Scar proximal to volar right wrist/forearm, flat and closed, routine healing         Range of Motion:               Active       Norms Right Left   Wrist Flex 0-80 40 55     Ext 0-70 33 60     Ulnar Dev 0-30 20 38     Radial Dev 0-20 15 25         Hand ROM     Index Middle Ring Small Thumb     MP 45  75    52  81 52  80 41  75       PIP 67  96    68  95 70  95 72  90 31  46 MP   DIP 50  65    45  80 40  80 47  81 47  71 IP   Total Active Motion   Right  162 165 162 160       Total Active Motion   Left  236 256 255 246          Hand Strength: Not appropriate for testing at this time     Gross Grasp 3pt Pinch Lateral Pinch Tip Pinch   Right  NT NT NT NT   Left NT NT NT NT      Nine-Hole Peg Test:  Left= _24___seconds              Right=__31__seconds  Finger Opposition: Unable to oppose thumb to ring and small digit

## 2023-07-12 ENCOUNTER — HOSPITAL ENCOUNTER (OUTPATIENT)
Facility: HOSPITAL | Age: 53
Discharge: HOME | End: 2023-07-15
Attending: PHYSICAL MEDICINE & REHABILITATION
Payer: MEDICARE

## 2023-07-12 PROCEDURE — 72148 MRI LUMBAR SPINE W/O DYE: CPT

## 2023-07-13 ENCOUNTER — APPOINTMENT (OUTPATIENT)
Facility: HOSPITAL | Age: 53
End: 2023-07-13
Payer: MEDICARE

## 2023-07-18 ENCOUNTER — HOSPITAL ENCOUNTER (OUTPATIENT)
Facility: HOSPITAL | Age: 53
Setting detail: RECURRING SERIES
Discharge: HOME | End: 2023-07-21
Payer: MEDICARE

## 2023-07-18 PROCEDURE — 97110 THERAPEUTIC EXERCISES: CPT

## 2023-07-18 PROCEDURE — 97530 THERAPEUTIC ACTIVITIES: CPT

## 2023-07-18 NOTE — PROGRESS NOTES
OCCUPATIONAL THERAPY - DAILY TREATMENT NOTE (updated )    Patient Name: Isadora Marrow    Date: 2023    : 1970  Insurance: Payor: Aichald  / Plan:  [address] / Product Type: *No Product type* /      Patient  verified yes     Visit #   Current / Total 2 8   Time   In / Out 10:00 10:38   38 mins   Pain   In / Out 5/10 2/10   Subjective Functional Status/Changes: \"The swelling has gone down a lot. \"     \"I can't make a fist yet but it is getting much better. \"    \"It is sore. \"    \"I buttoned my pants and could put my hair up, still can't color it. \"         TREATMENT AREA =  Pain in right wrist [M25.531]  Other intraarticular fracture of lower end of right radius, subsequent encounter for closed fracture with routine healing [S52.780Z]    OBJECTIVE    Modalities Rationale:     decrease edema, decrease inflammation, and decrease pain to improve patient's ability to progress to PLOF and address remaining functional goals. min [] Estim Unattended, type/location:                                      []  w/ice    []  w/heat    min [] Estim Attended, type/location:                                     []  w/US     []  w/ice    []  w/heat    []  TENS insruct      min []  Mechanical Traction: type/lbs                   []  pro   []  sup   []  int   []  cont    []  before manual    []  after manual    min []  Ultrasound, settings/location:    []Continuous   [] Pulsed                             []1MHz   []3MHz    W/cm2:  Location:    min []  Iontophoresis w/ dexamethasone, location:                                               []  take home patch       []  in clinic   8 min  unbilled [x]  Ice     []  Heat    location/position: Right hand/wrist     min []  Paraffin,  details:     min []  Vasopneumatic Device, press/temp:     min []  Ali Lyme / Kip Kalata:     If using vaso (only need to measure limb vaso being performed on)      pre-treatment girth :       post-treatment

## 2023-07-19 ENCOUNTER — HOSPITAL ENCOUNTER (OUTPATIENT)
Facility: HOSPITAL | Age: 53
Setting detail: RECURRING SERIES
Discharge: HOME | End: 2023-07-22
Payer: MEDICARE

## 2023-07-19 PROCEDURE — 97530 THERAPEUTIC ACTIVITIES: CPT

## 2023-07-19 PROCEDURE — 97110 THERAPEUTIC EXERCISES: CPT

## 2023-07-25 ENCOUNTER — HOSPITAL ENCOUNTER (OUTPATIENT)
Facility: HOSPITAL | Age: 53
Setting detail: RECURRING SERIES
Discharge: HOME | End: 2023-07-28
Payer: MEDICARE

## 2023-07-25 PROCEDURE — 97018 PARAFFIN BATH THERAPY: CPT

## 2023-07-25 PROCEDURE — 97530 THERAPEUTIC ACTIVITIES: CPT

## 2023-07-25 PROCEDURE — 97110 THERAPEUTIC EXERCISES: CPT

## 2023-07-27 ENCOUNTER — HOSPITAL ENCOUNTER (OUTPATIENT)
Facility: HOSPITAL | Age: 53
Setting detail: RECURRING SERIES
Discharge: HOME | End: 2023-07-30
Payer: MEDICARE

## 2023-07-27 PROCEDURE — 97018 PARAFFIN BATH THERAPY: CPT

## 2023-07-27 PROCEDURE — 97530 THERAPEUTIC ACTIVITIES: CPT

## 2023-07-27 PROCEDURE — 97110 THERAPEUTIC EXERCISES: CPT

## 2023-08-01 ENCOUNTER — HOSPITAL ENCOUNTER (OUTPATIENT)
Facility: HOSPITAL | Age: 53
Setting detail: RECURRING SERIES
Discharge: HOME | End: 2023-08-04
Payer: MEDICARE

## 2023-08-01 PROCEDURE — 97530 THERAPEUTIC ACTIVITIES: CPT

## 2023-08-01 PROCEDURE — 97110 THERAPEUTIC EXERCISES: CPT

## 2023-08-01 PROCEDURE — 97018 PARAFFIN BATH THERAPY: CPT

## 2023-08-03 ENCOUNTER — HOSPITAL ENCOUNTER (OUTPATIENT)
Facility: HOSPITAL | Age: 53
Setting detail: RECURRING SERIES
Discharge: HOME | End: 2023-08-06
Payer: MEDICARE

## 2023-08-03 PROCEDURE — 97018 PARAFFIN BATH THERAPY: CPT

## 2023-08-03 PROCEDURE — 97110 THERAPEUTIC EXERCISES: CPT

## 2023-08-03 PROCEDURE — 97530 THERAPEUTIC ACTIVITIES: CPT

## 2023-08-03 NOTE — PROGRESS NOTES
[facility]  [phone] fax      Patient Name: Josephine Phillips  : 1970         Occupational Therapy Transfer Instructions         Hello! I have put you on the transfer list to transfer from the [facility] F[facility]) to our [employer]. The [facility], clinic should be reaching out to you within the next week to get you scheduled at that location. If you do not hear from them within 7 days, PLEASE call the clinic (number listed above). Please feel free to reach out to the [facility] or [facility] for any other questions. I look forward to continuing our work together! Please call with any questions or concerns. Thank you for your participation.          [address] [place] [address], CECE,OVALLES/L  8/3/2023  10:18 AM
decreased pain in the right wrist to 2-4/10 to increase performance in daily activities. Status at IE: Pt reports 7/10 pain. Pt reports pain is dependent upon activity performed. Current Status (8/3/2023): Pain increase recently due to increased anxiety secondary to potential need for another surgery       Patient will be compliant with HEP for ROM/stretches/strengthening in order to improve performance in daily tasks. Status at IE: Pt reports performing gentle exercises such as digit opposition, active digit flexion, and tendon glides. Will initiate further HEP at future session in accordance to dx protocol. Current Status (8/3/2023): patient compliant with HEP, good movement patterns, progressing. 3. Patient will be compliant with implementing activity modification strategies into daily routine to improve performance in daily tasks. Status at IE: Not yet initiated   Current Status (8/3/2023): Not implementing at this time, goal to be continues      Long Term Goals: To be accomplished in 4-8 weeks   Patient will increase right wrist flexion to 50 degrees and extension to 45 degrees or greater to improve function for ADLs/IADLs. Status at IE:  Flexion: 40, Extension 33  Current Status (8/3/2023): Flexion:  35       Extension 57    2. Patient will increase right index, middle, ring, and small digit total active motion to 200 degrees or greater to improve grasp and manipulation of objects. Status at IE:  , , ,   Current Status (8/3/2023):  IF  181  ,     , RF  183  , SF  148    3. Patient will increase right thumb MP flexion to 40 degrees and IP flexion to 60 degrees or greater to improve grasp and manipulation of objects. Status at IE: MP 31, IP 47  Current Status (8/3/2023):  MP 24      IP 42    4.  Patient will be able to complete the 9 hole peg test with the right hand in 25 seconds in order to improve ability to manipulate clothing fasteners and overall fine

## 2023-08-18 ENCOUNTER — HOSPITAL ENCOUNTER (OUTPATIENT)
Facility: HOSPITAL | Age: 53
Setting detail: RECURRING SERIES
Discharge: HOME | End: 2023-08-21
Payer: MEDICARE

## 2023-08-18 PROCEDURE — 97530 THERAPEUTIC ACTIVITIES: CPT

## 2023-08-18 PROCEDURE — 97018 PARAFFIN BATH THERAPY: CPT

## 2023-08-18 PROCEDURE — 97110 THERAPEUTIC EXERCISES: CPT

## 2023-08-28 ENCOUNTER — APPOINTMENT (OUTPATIENT)
Facility: HOSPITAL | Age: 53
End: 2023-08-28
Payer: MEDICARE

## 2023-08-28 ENCOUNTER — HOSPITAL ENCOUNTER (OUTPATIENT)
Facility: HOSPITAL | Age: 53
Setting detail: RECURRING SERIES
Discharge: HOME | End: 2023-08-31
Payer: MEDICARE

## 2023-08-28 PROCEDURE — 97018 PARAFFIN BATH THERAPY: CPT

## 2023-08-28 PROCEDURE — 97110 THERAPEUTIC EXERCISES: CPT

## 2023-08-28 NOTE — PROGRESS NOTES
9/1/2023  2:00 PM Karen, OT [facility]   9/7/2023 11:20 AM Wilson, OT [facility]   9/8/2023 10:00  W [facility] INFUSION CHAIR 4 [facility]   9/12/2023 10:40 AM Wilson, OT [facility]   9/14/2023 11:20 AM Ayaka Cortez, CECE [facility]TPB Dheere Bolo   9/19/2023  2:00 PM Wilson, OT [facility]   9/22/2023 10:00  [address] INFUSION CHAIR 2 [facility]

## 2023-09-01 ENCOUNTER — APPOINTMENT (OUTPATIENT)
Facility: HOSPITAL | Age: 53
End: 2023-09-01
Payer: MEDICARE

## 2023-09-07 ENCOUNTER — HOSPITAL ENCOUNTER (OUTPATIENT)
Facility: HOSPITAL | Age: 53
Setting detail: RECURRING SERIES
Discharge: HOME | End: 2023-09-10
Payer: MEDICARE

## 2023-09-07 PROCEDURE — 97018 PARAFFIN BATH THERAPY: CPT

## 2023-09-07 PROCEDURE — 97530 THERAPEUTIC ACTIVITIES: CPT

## 2023-09-07 PROCEDURE — 97110 THERAPEUTIC EXERCISES: CPT

## 2023-09-12 ENCOUNTER — HOSPITAL ENCOUNTER (OUTPATIENT)
Facility: HOSPITAL | Age: 53
Setting detail: RECURRING SERIES
Discharge: HOME | End: 2023-09-15
Payer: MEDICARE

## 2023-09-12 PROCEDURE — 97018 PARAFFIN BATH THERAPY: CPT

## 2023-09-12 PROCEDURE — 97110 THERAPEUTIC EXERCISES: CPT

## 2023-09-12 PROCEDURE — 97535 SELF CARE MNGMENT TRAINING: CPT

## 2023-09-12 NOTE — PROGRESS NOTES
OARRS reviewed -   [place] filled.
strategies into daily routine to improve performance in daily tasks. Status at IE: Not yet initiated   Status at PN (8/18/23):  pt educated on use of heating pad at home. Continue to address. Patient will increase right wrist flexion to 50 degrees and extension to 45 degrees or greater to improve function for ADLs/IADLs. Status at IE:  Flexion: 40, Extension 33  Status at PN (8/18/23): Flexion= 30; Extension= 42; goal not met. 2.  Patient will increase right index, middle, ring, and small digit total active motion to 200 degrees or greater to improve grasp and manipulation of objects. Status at IE:  , , ,   Status at PN (8/18/23):  IF= 200 deg, MF= 221, WJ=559, OR=995, goal met for digits 2-4; progressing with SF.      3.  Patient will increase right thumb MP flexion to 40 degrees and IP flexion to 60 degrees or greater to improve grasp and manipulation of objects. Status at IE: MP 31, IP 47  Status at PN (8/18/23): MP= 40 deg; IP= 48 deg, goal met for the MP; goal met for the IP flexion      4. Patient will be able to complete the 9 hole peg test with the right hand in 25 seconds in order to improve ability to manipulate clothing fasteners and overall fine motor skills. Status at IE: Nine-Hole Peg Test:  Left= 24          Right= 31  Status at PN (8/18/23): goal met. 5. Patient will be able to use the right UE to participate in functional clinic tasks for 15 minutes or greater without pain present in order to improve activity tolerance for completion of ADLs/IADLs. Status at IE: Not yet initiated   Status at PN (8/18/23):  goal met.       PLAN  [x]  Continue Plan of Care  []  Upgrade activities as tolerated    []  Discharge due to :    []  Other:      Therapist: Cheikh Caraballo OT    Date: 9/12/2023 Time: 8:07 AM     Future Appointments   Date Time Provider [address]   9/12/2023 10:40 AM Cheikh Caraballo OT [facility]   9/14/2023 11:20 AM Delilah Cantu

## 2023-09-14 ENCOUNTER — APPOINTMENT (OUTPATIENT)
Facility: HOSPITAL | Age: 53
End: 2023-09-14
Payer: MEDICARE

## 2023-09-19 ENCOUNTER — HOSPITAL ENCOUNTER (OUTPATIENT)
Facility: HOSPITAL | Age: 53
Setting detail: RECURRING SERIES
Discharge: HOME | End: 2023-09-22
Payer: MEDICARE

## 2023-09-19 PROCEDURE — 97530 THERAPEUTIC ACTIVITIES: CPT

## 2023-09-19 PROCEDURE — 97018 PARAFFIN BATH THERAPY: CPT

## 2023-09-19 PROCEDURE — 97110 THERAPEUTIC EXERCISES: CPT

## 2023-09-19 NOTE — PROGRESS NOTES
Patient will increase right wrist flexion to 50 degrees and extension to 45 degrees or greater to improve function for ADLs/IADLs. Status at IE:  Flexion: 40, Extension 33  Status at PN (8/18/23): Flexion= 30; Extension= 42; goal not met. 9/19/23: 58 degrees improvement goal met   35 degrees flexion progressing      2. Patient will increase right index, middle, ring, and small digit total active motion to 200 degrees or greater to improve grasp and manipulation of objects. Status at IE:  , , ,   Status at PN (8/18/23):  IF= 200 deg, MF= 221, ZI=842, UG=927, goal met for digits 2-4; progressing with SF.   9/19/23: IF: 224 degrees, goal met    MF:  215          SF: 209 progressing    Goals met for all fingers but still painful      3. Patient will increase right thumb MP flexion to 40 degrees and IP flexion to 60 degrees or greater to improve grasp and manipulation of objects. Status at IE: MP 31, IP 47  Status at PN (8/18/23): MP= 40 deg; IP= 48 deg, goal met for the MP; goal met for the IP flexion      4. Patient will be able to complete the 9 hole peg test with the right hand in 25 seconds in order to improve ability to manipulate clothing fasteners and overall fine motor skills. Status at IE: Nine-Hole Peg Test:  Left= 24          Right= 31  Status at PN (8/18/23): goal met. 5. Patient will be able to use the right UE to participate in functional clinic tasks for 15 minutes or greater without pain present in order to improve activity tolerance for completion of ADLs/IADLs. Status at IE: Not yet initiated   Status at PN (8/18/23):  goal met.             ASSESSMENT/RECOMMENDATIONS:  [x]Continue therapy per initial plan/protocol at a frequency of  2 x per week for 6 weeks    Thank you for this referral.   SRINIVASA Diaz, OTR/L, T  9/19/2023 8:45
to 40 degrees and IP flexion to 60 degrees or greater to improve grasp and manipulation of objects. Status at IE: MP 31, IP 47  Status at PN (8/18/23): MP= 40 deg; IP= 48 deg, goal met for the MP; goal met for the IP flexion      4. Patient will be able to complete the 9 hole peg test with the right hand in 25 seconds in order to improve ability to manipulate clothing fasteners and overall fine motor skills. Status at IE: Nine-Hole Peg Test:  Left= 24          Right= 31  Status at PN (8/18/23): goal met. 5. Patient will be able to use the right UE to participate in functional clinic tasks for 15 minutes or greater without pain present in order to improve activity tolerance for completion of ADLs/IADLs. Status at IE: Not yet initiated   Status at PN (8/18/23):  goal met.        PLAN  Yes  Continue plan of care  []  Upgrade activities as tolerated  []  Discharge due to :  []  Other:    SRINIVASA Zuluaga, OTR/L, CHT     9/19/2023    8:44 AM    Future Appointments   Date Time Provider [address] [place] [address]   9/19/2023  5:20 PM [facility] [address] [facility]   9/22/2023 10:00 AM 1800 [facility] 7 [facility]   9/26/2023 11:15 AM MD TRISTEN Kirkpatrick BS AMB   10/6/2023  9:30 AM [facility] 7 [facility]   10/20/2023 10:00 AM [facility] 2 [facility]   11/3/2023 10:00 AM [facility] 4 [facility]   11/17/2023 10:00 AM [facility] 1 [facility]

## 2023-09-21 ENCOUNTER — TELEPHONE (OUTPATIENT)
Facility: HOSPITAL | Age: 53
End: 2023-09-21

## 2023-09-22 PROBLEM — E66.9 OBESITY (BMI 30.0-34.9): Status: ACTIVE | Noted: 2023-09-22

## 2023-09-22 PROBLEM — T14.90XA TRAUMA: Status: ACTIVE | Noted: 2023-06-11

## 2023-09-22 PROBLEM — R13.19 ESOPHAGEAL DYSPHAGIA: Status: ACTIVE | Noted: 2023-06-01

## 2023-09-22 PROBLEM — G47.30 SLEEP APNEA: Status: ACTIVE | Noted: 2022-03-01

## 2023-09-22 PROBLEM — E66.01 MORBID OBESITY (HCC): Status: ACTIVE | Noted: 2023-08-10

## 2023-09-22 PROBLEM — R10.13 EPIGASTRIC PAIN: Status: ACTIVE | Noted: 2022-02-11

## 2023-09-22 PROBLEM — G45.9 TIA (TRANSIENT ISCHEMIC ATTACK): Status: ACTIVE | Noted: 2023-09-22

## 2023-09-22 PROBLEM — E66.811 OBESITY (BMI 30.0-34.9): Status: ACTIVE | Noted: 2023-09-22

## 2023-09-22 PROBLEM — S00.03XA SCALP HEMATOMA, INITIAL ENCOUNTER: Status: ACTIVE | Noted: 2023-06-11

## 2023-09-22 PROBLEM — R11.10 VOMITING: Status: ACTIVE | Noted: 2023-06-01

## 2023-09-22 PROBLEM — S02.19XA CLOSED FRACTURE OF ORBITAL PLATE OF ETHMOID BONE (HCC): Status: ACTIVE | Noted: 2023-06-11

## 2023-09-22 PROBLEM — S52.571D: Status: ACTIVE | Noted: 2023-06-11

## 2023-09-22 PROBLEM — S01.81XA FOREHEAD LACERATION, INITIAL ENCOUNTER: Status: ACTIVE | Noted: 2023-06-11

## 2023-09-22 PROBLEM — K22.10 EROSIVE ESOPHAGITIS: Status: ACTIVE | Noted: 2023-06-01

## 2023-09-22 PROBLEM — S62.111A TRIQUETRAL CHIP FRACTURE, RIGHT, CLOSED, INITIAL ENCOUNTER: Status: ACTIVE | Noted: 2023-06-11

## 2023-09-22 PROBLEM — K76.0 FATTY LIVER: Status: ACTIVE | Noted: 2022-02-11

## 2023-09-22 RX ORDER — IBUPROFEN 800 MG/1
TABLET ORAL
COMMUNITY
Start: 2023-09-11

## 2023-09-26 ENCOUNTER — OFFICE VISIT (OUTPATIENT)
Age: 53
End: 2023-09-26
Payer: MEDICARE

## 2023-09-26 VITALS
HEIGHT: 58 IN | OXYGEN SATURATION: 93 % | BODY MASS INDEX: 37.57 KG/M2 | HEART RATE: 67 BPM | SYSTOLIC BLOOD PRESSURE: 121 MMHG | TEMPERATURE: 97.2 F | DIASTOLIC BLOOD PRESSURE: 68 MMHG | WEIGHT: 179 LBS

## 2023-09-26 DIAGNOSIS — M51.26 HNP (HERNIATED NUCLEUS PULPOSUS), LUMBAR: Primary | ICD-10-CM

## 2023-09-26 DIAGNOSIS — M54.16 LUMBAR NEURITIS: ICD-10-CM

## 2023-09-26 DIAGNOSIS — M51.36 DDD (DEGENERATIVE DISC DISEASE), LUMBAR: ICD-10-CM

## 2023-09-26 DIAGNOSIS — M79.7 FIBROMYALGIA: ICD-10-CM

## 2023-09-26 PROCEDURE — 99214 OFFICE O/P EST MOD 30 MIN: CPT | Performed by: PHYSICAL MEDICINE & REHABILITATION

## 2023-10-03 ENCOUNTER — HOSPITAL ENCOUNTER (OUTPATIENT)
Facility: HOSPITAL | Age: 53
Setting detail: RECURRING SERIES
Discharge: HOME | End: 2023-10-06
Payer: MEDICARE

## 2023-10-03 PROCEDURE — 97530 THERAPEUTIC ACTIVITIES: CPT

## 2023-10-03 PROCEDURE — 97110 THERAPEUTIC EXERCISES: CPT

## 2023-10-03 NOTE — PROGRESS NOTES
[facility]   11/17/2023 10:00 AM 1103 [facility]

## 2023-10-13 ENCOUNTER — HOSPITAL ENCOUNTER (OUTPATIENT)
Facility: HOSPITAL | Age: 53
Setting detail: RECURRING SERIES
Discharge: HOME | End: 2023-10-16
Payer: MEDICARE

## 2023-10-13 PROCEDURE — 97110 THERAPEUTIC EXERCISES: CPT

## 2023-10-13 PROCEDURE — 97530 THERAPEUTIC ACTIVITIES: CPT

## 2023-10-13 PROCEDURE — 97018 PARAFFIN BATH THERAPY: CPT

## 2023-10-20 ENCOUNTER — HOSPITAL ENCOUNTER (OUTPATIENT)
Facility: HOSPITAL | Age: 53
Setting detail: RECURRING SERIES
Discharge: HOME | End: 2023-10-23
Payer: MEDICARE

## 2023-10-20 PROCEDURE — 97535 SELF CARE MNGMENT TRAINING: CPT

## 2023-10-20 PROCEDURE — 97018 PARAFFIN BATH THERAPY: CPT

## 2023-10-20 PROCEDURE — 97110 THERAPEUTIC EXERCISES: CPT

## 2023-10-20 NOTE — PROGRESS NOTES
[facility] [place]  [phone] fax      Patient Name: Ariel Carter  : 1970      Occupational Therapy Discharge Instructions        Continue with home exercise program for 2 times a day for 4 weeks then decrease to 1 times a day as needed/patient discretion. Continue with    [x] Ice  as needed 1 times per day     [x] Heat           Follow up with MD:     [] Upon completion of therapy     [x] As needed      Recommendations:    []   Return to activity with home program                    [x]  Return to activity with the following modifications :                              []  Practice Hand coordination activities                                      [x]  Wear Splint as prescribed:at work                    [] Return to/Join local gym        Additional comments:        Please call with any questions or concerns. Thank you for your participation.          CECE Means COTA/L  10/20/2023  12:18 PM

## 2023-10-23 NOTE — PROGRESS NOTES
[facility] - [place] [id number] [facility] [place]  [phone] fax    Occupational Therapy Discharge Summary    Patient name: Mag Jimenez Start of Care: 7/10/2023   Referral source: JOHN Cao* : 1970   Medical/Treatment Diagnosis: Pain in right wrist [M25.531]  Other intraarticular fracture of lower end of right radius, subsequent encounter for closed fracture with routine healing [S52.666V]  Payor: Jermaine Sprain / Plan: [address] / Product Type: *No Product type* /  Onset Date:2023     Prior Hospitalization: see medical history Provider#: [id number]   Comorbidities: Hx of Allergies, Anxiety or Panic Disorders, Asthma, Back pain, Cancer, Depression, Gastrointestinal Disease, Headaches, Hearing Impairment, High Blood Pressure, Prior Surgery, CVA, Hx of right Carpal Tunnel release   Prior Level of Function:Not currently working, Volunteer work, Independent in ADLs/IADLs  Visits from [place] of Care: 15    Missed Visits: 2  Reporting Period : 23 to 10/20/23    Summary of Care:  Patient will increase right wrist flexion to 50 degrees and extension to 45 degrees or greater to improve function for ADLs/IADLs. Status at IE:  Flexion: 40, Extension 33  Status at PN (23): Flexion= 30; Extension= 42; goal not met. Status at D/C (10/20/2023): Rigth wrist flexion: 40  degrees (30), right wrist extension:   54  degrees (42)     2. Patient will increase right index, middle, ring, and small digit total active motion to 200 degrees or greater to improve grasp and manipulation of objects.    Status at IE:  , , ,   Status at PN (23):  IF= 200 deg, MF= 221, YF=793, LO=276, goal met for digits 2-4; progressing with SF.   23: IF: 224 degrees, goal met    MF:  215          SF: 209 progressing    Goals met for all fingers but still painful     Status at D/C (10/20/2023):

## 2023-10-26 ENCOUNTER — HOSPITAL ENCOUNTER (OUTPATIENT)
Facility: HOSPITAL | Age: 53
Discharge: HOME | End: 2023-10-26
Payer: MEDICARE

## 2023-10-26 ENCOUNTER — HOSPITAL ENCOUNTER (OUTPATIENT)
Facility: HOSPITAL | Age: 53
End: 2023-10-26
Payer: MEDICARE

## 2023-10-26 VITALS
OXYGEN SATURATION: 94 % | SYSTOLIC BLOOD PRESSURE: 144 MMHG | HEART RATE: 98 BPM | RESPIRATION RATE: 16 BRPM | DIASTOLIC BLOOD PRESSURE: 89 MMHG | TEMPERATURE: 98.9 F

## 2023-10-26 DIAGNOSIS — Z01.818 PREOP TESTING: Primary | ICD-10-CM

## 2023-10-26 PROBLEM — M51.26 DISPLACEMENT OF LUMBAR INTERVERTEBRAL DISC WITHOUT MYELOPATHY: Status: ACTIVE | Noted: 2023-10-26

## 2023-10-26 LAB — GLUCOSE BLD STRIP.AUTO-MCNC: 128 MG/DL (ref 70–110)

## 2023-10-26 PROCEDURE — 6370000000 HC RX 637 (ALT 250 FOR IP): Performed by: PHYSICAL MEDICINE & REHABILITATION

## 2023-10-26 PROCEDURE — 82962 GLUCOSE BLOOD TEST: CPT

## 2023-10-26 PROCEDURE — 62323 NJX INTERLAMINAR LMBR/SAC: CPT

## 2023-10-26 PROCEDURE — 2500000003 HC RX 250 WO HCPCS: Performed by: PHYSICAL MEDICINE & REHABILITATION

## 2023-10-26 PROCEDURE — 62323 NJX INTERLAMINAR LMBR/SAC: CPT | Performed by: PHYSICAL MEDICINE & REHABILITATION

## 2023-10-26 PROCEDURE — 6360000002 HC RX W HCPCS: Performed by: PHYSICAL MEDICINE & REHABILITATION

## 2023-10-26 RX ORDER — LIDOCAINE HYDROCHLORIDE 10 MG/ML
30 INJECTION, SOLUTION EPIDURAL; INFILTRATION; INTRACAUDAL; PERINEURAL ONCE
Status: COMPLETED | OUTPATIENT
Start: 2023-10-26 | End: 2023-10-26

## 2023-10-26 RX ORDER — DIAZEPAM 5 MG/1
5 TABLET ORAL ONCE
Status: COMPLETED | OUTPATIENT
Start: 2023-10-26 | End: 2023-10-26

## 2023-10-26 RX ORDER — ONDANSETRON 4 MG/1
4 TABLET, ORALLY DISINTEGRATING ORAL EVERY 8 HOURS PRN
COMMUNITY
Start: 2023-10-10

## 2023-10-26 RX ORDER — DIAZEPAM 5 MG/1
2.5 TABLET ORAL ONCE
Status: COMPLETED | OUTPATIENT
Start: 2023-10-26 | End: 2023-10-26

## 2023-10-26 RX ORDER — DIAZEPAM 5 MG/1
10 TABLET ORAL ONCE
Status: COMPLETED | OUTPATIENT
Start: 2023-10-26 | End: 2023-10-26

## 2023-10-26 RX ORDER — DEXAMETHASONE SODIUM PHOSPHATE 10 MG/ML
10 INJECTION, SOLUTION INTRAMUSCULAR; INTRAVENOUS ONCE
Status: COMPLETED | OUTPATIENT
Start: 2023-10-26 | End: 2023-10-26

## 2023-10-26 RX ADMIN — LIDOCAINE HYDROCHLORIDE 12 ML: 10 INJECTION, SOLUTION EPIDURAL; INFILTRATION; INTRACAUDAL; PERINEURAL at 13:28

## 2023-10-26 RX ADMIN — DEXAMETHASONE SODIUM PHOSPHATE 10 MG: 10 INJECTION, SOLUTION INTRAMUSCULAR; INTRAVENOUS at 13:30

## 2023-10-26 RX ADMIN — DIAZEPAM 5 MG: 5 TABLET ORAL at 11:42

## 2023-10-26 ASSESSMENT — PAIN DESCRIPTION - DESCRIPTORS: DESCRIPTORS: THROBBING;SHARP

## 2023-10-26 ASSESSMENT — PAIN - FUNCTIONAL ASSESSMENT: PAIN_FUNCTIONAL_ASSESSMENT: 0-10

## 2023-10-26 ASSESSMENT — PAIN SCALES - GENERAL: PAINLEVEL_OUTOF10: 0

## 2023-10-26 NOTE — OP NOTE
Intralaminar Epidural Steroid Block Procedure Note      Patient Name: Hall Babinski    Date of Procedure: October 26, 2023    Preoperative Diagnosis: M51.26  Post Operative Diagnosis: same    Procedure: L4-L5 Epidural Block     PROCEDURE:  Lumbar Epidural Block    Consent:  Informed  Consent was obtained prior to the procedure. The patient was given the opportunity to ask questions regarding the procedure and its associated risks. In addition to the potential risks associated with the procedure itself, the patient was informed both verbally and in writing of potential side effects of the use glucocorticoids. The patient appeared to comprehend the informed consent and desired to have the procedure performed. The patient was placed in the prone position on the fluoroscopy table and the back prepped and draped in the usual sterile manner. The interlaminar space was identified using fluoroscopy and the skin anesthetized with 1% Lidocaine. A #18 Tuohy Epidural needle was advanced under fluoroscopic control from a paramedian approach to the  epidural space as noted above, using the loss of resistance to fluid technique. Then, 10 mg of preservative free Dexamethasone and 2 cc of Lidocaine was slowly injected. The patient tolerated the procedure well. The injection area was cleaned and band aids applied. No excessive bleeding was noted. Patient dressed and was discharged to home with instructions.

## 2023-11-25 PROBLEM — Z01.818 PREOP TESTING: Status: RESOLVED | Noted: 2023-10-26 | Resolved: 2023-11-25

## 2023-11-29 NOTE — PROGRESS NOTES
[facility]  [address]  [address]  [place], [place]  Tel: [phone]  Fax: [phone]          PROGRESS NOTE      HISTORY OF PRESENT ILLNESS:  The patient is a 48 y.o. female and was seen today for follow up of centralized low back pain. Patient has a new c/o new thoracic spine pain (at T10; burning sensation) x 10/26/2023. Previously seen for low back pain radiating into the BLE(L=R) in a circumferential distribution to the feet since 4/4/2023 without injury. Patient notes acute onset. Her pain is exacerbated by no position. Patient says baseline pain is a 3/10. Patient reports Bowel incontinence last wednesday but it is has been fine since then. Pt denies change in bowel or bladder habits. Patient denies recent fevers, weight loss, rashes or skin sores. Patient says she has lost 70 pounds in the past 5 years with diet and exercise. No stomach ulcers or bleeding disorders. No history of spinal surgery or injections. Patient has had trigger point injections in her spine. Patient is done with PT for her lower back. Patient had no relief with the low back. She is compliant with her HEP. Pt denies DM. Patient reports that to her knowledge her kidneys function properly, GFR over 60 on 3/23/2023. Patient took Tylenol without relief, Lidoderm patches without relief, naproxen without relief, and Flexeril without relief. Patient said she had surgeries for her bladder, uterine, and cervical cancer. Patient denies chemotherapy or radiation for her previous cancers. Patient says she took prednisone over a year ago and says she tolerated it. Patient also tolerated the Naproxen when she took it recently. Patient is not followed by any for her bipolar disorder. Patient had a right wrist surgery on 6/12/2023 by Dr. Kemal Coy. Patient has an extensive allergy list. The patient is RHD.  PmHx of bipolar disorder, Asthma, fibromyalgia, bladder cancer, uterine cancer, cervical cancer,

## 2023-11-30 ENCOUNTER — OFFICE VISIT (OUTPATIENT)
Age: 53
End: 2023-11-30
Payer: MEDICARE

## 2023-11-30 VITALS
BODY MASS INDEX: 37.36 KG/M2 | HEART RATE: 85 BPM | OXYGEN SATURATION: 94 % | TEMPERATURE: 97.3 F | HEIGHT: 58 IN | WEIGHT: 178 LBS

## 2023-11-30 DIAGNOSIS — M51.36 DDD (DEGENERATIVE DISC DISEASE), LUMBAR: ICD-10-CM

## 2023-11-30 DIAGNOSIS — M79.7 FIBROMYALGIA: ICD-10-CM

## 2023-11-30 DIAGNOSIS — M54.16 LUMBAR NEURITIS: ICD-10-CM

## 2023-11-30 DIAGNOSIS — M54.6 THORACIC BACK PAIN, UNSPECIFIED BACK PAIN LATERALITY, UNSPECIFIED CHRONICITY: Primary | ICD-10-CM

## 2023-11-30 DIAGNOSIS — M51.26 HNP (HERNIATED NUCLEUS PULPOSUS), LUMBAR: ICD-10-CM

## 2023-11-30 PROCEDURE — 99214 OFFICE O/P EST MOD 30 MIN: CPT | Performed by: PHYSICAL MEDICINE & REHABILITATION

## 2023-11-30 PROCEDURE — 72070 X-RAY EXAM THORAC SPINE 2VWS: CPT | Performed by: PHYSICAL MEDICINE & REHABILITATION

## 2023-11-30 RX ORDER — BLOOD SUGAR DIAGNOSTIC
STRIP MISCELLANEOUS
COMMUNITY
Start: 2023-11-27

## 2024-01-05 ENCOUNTER — OFFICE VISIT (OUTPATIENT)
Age: 54
End: 2024-01-05
Payer: MEDICARE

## 2024-01-05 VITALS — WEIGHT: 186 LBS | HEIGHT: 58 IN | BODY MASS INDEX: 39.04 KG/M2

## 2024-01-05 DIAGNOSIS — M79.641 RIGHT HAND PAIN: Primary | ICD-10-CM

## 2024-01-05 DIAGNOSIS — Z98.890 S/P ORIF (OPEN REDUCTION INTERNAL FIXATION) FRACTURE: ICD-10-CM

## 2024-01-05 DIAGNOSIS — Z87.81 S/P ORIF (OPEN REDUCTION INTERNAL FIXATION) FRACTURE: ICD-10-CM

## 2024-01-05 PROCEDURE — 99213 OFFICE O/P EST LOW 20 MIN: CPT | Performed by: ORTHOPAEDIC SURGERY

## 2024-01-05 NOTE — PROGRESS NOTES
capsule       fluticasone (FLONASE) 50 MCG/ACT nasal spray 2 sprays by Nasal route daily as needed      fluticasone-salmeterol (ADVAIR) 250-50 MCG/ACT AEPB diskus inhaler Inhale 1 puff into the lungs in the morning and 1 puff in the evening.      LORazepam (ATIVAN) 1 MG tablet Take 1 tablet by mouth 3 times daily.      Melatonin 10 MG TABS Take 1 tablet by mouth      omalizumab (XOLAIR) 150 MG injection Inject 375 mg into the skin every 14 days      ramelteon (ROZEREM) 8 MG tablet Take by mouth       No current facility-administered medications for this visit.       Allergies   Allergen Reactions    Ascorbate Anaphylaxis     Pt denies having this allergy    Ketorolac Seizure     Other reaction(s): neurological reaction  Other reaction(s): neurological reaction      Penicillins Anaphylaxis     Other reaction(s): other/intolerance, unknown  Other reaction(s): other/intolerance, unknown      Strawberry Anaphylaxis    Sulfur Anaphylaxis    Tramadol Seizure     Other reaction(s): neurological reaction  Other reaction(s): neurological reaction      Adhesive Tape Hives     Patient stated tegaderm and paper tape okay    Chlorpromazine Other (See Comments)     Agitated  Other reaction(s): unknown  Other reaction(s): Other (comments), unknown  Agitated      Dog Epithelium Allergy Skin Test      Other reaction(s): nasal symptoms  And asthma    Doxepin Other (See Comments)     Agitated    Magnesium Sulfate      Other reaction(s): other/intolerance, unknown    Rizatriptan      Other reaction(s): other/intolerance, unknown    Valproic Acid Other (See Comments)     Paralysis  Other reaction(s): unknown  Other reaction(s): unknown  Paralysis      Cephalexin Rash    Levofloxacin Nausea And Vomiting    Quetiapine Rash     Other reaction(s): GI intolerance, neurological reaction    Sulfa Antibiotics Nausea And Vomiting and Other (See Comments)     Magnesium Sulfate  Other reaction(s): gi distress, unknown  Magnesium Sulfate

## 2024-01-30 NOTE — PROGRESS NOTES
[facility]  [address] [place] [address].  [address]  [place], [place] [address]  Tel: [phone]  Fax: [phone]        PROGRESS NOTE      HISTORY OF PRESENT ILLNESS:  The patient is a 53 y.o. female and was seen today for follow up of centralized low back pain. Patient has a new c/o new thoracic spine pain (at T10; burning sensation) x 10/26/2023 (low back pain=thoracic spine pain). Previously seen for low back pain radiating into the BLE(L=R) in a circumferential distribution to the feet since 4/4/2023 without injury. Patient notes acute onset. Her pain is exacerbated by no position. Patient says baseline pain is a 3/10. Patient reports Bowel incontinence last wednesday but it is has been fine since then. Pt denies change in bowel or bladder habits. Patient denies recent fevers, weight loss, rashes or skin sores. Patient says she has lost 70 pounds in the past 5 years with diet and exercise. No stomach ulcers or bleeding disorders.No history of spinal surgery. Patient has had trigger point injections in her spine. Pt underwent L4-5 epidural on 10/26/2023 with benefit, resolution of BLE pain. Patient is done with PT for her lower back. Patient had no relief with the low back. She is compliant with her HEP. Pt denies DM. Patient reports that to her knowledge her kidneys function properly, GFR over 60 on 3/23/2023. Patient took Tylenol without relief, Lidoderm patches without relief, naproxen without relief, and Flexeril without relief. Patient said she had surgeries for her bladder, uterine, and cervical cancer. Patient denies chemotherapy or radiation for her previous cancers. Patient says she took prednisone over a year ago and says she tolerated it. Patient also tolerated the Naproxen when she took it recently. Patient is not followed by any for her bipolar disorder. Patient had a right wrist surgery on 6/12/2023 by Dr. Robert. Patient has an extensive allergy list. The

## 2024-02-01 ENCOUNTER — OFFICE VISIT (OUTPATIENT)
Age: 54
End: 2024-02-01
Payer: MEDICARE

## 2024-02-01 VITALS — BODY MASS INDEX: 38.87 KG/M2 | HEIGHT: 58 IN

## 2024-02-01 DIAGNOSIS — M51.26 HNP (HERNIATED NUCLEUS PULPOSUS), LUMBAR: ICD-10-CM

## 2024-02-01 DIAGNOSIS — M54.16 LUMBAR NEURITIS: ICD-10-CM

## 2024-02-01 DIAGNOSIS — M51.36 DDD (DEGENERATIVE DISC DISEASE), LUMBAR: ICD-10-CM

## 2024-02-01 DIAGNOSIS — M54.6 THORACIC BACK PAIN, UNSPECIFIED BACK PAIN LATERALITY, UNSPECIFIED CHRONICITY: Primary | ICD-10-CM

## 2024-02-01 DIAGNOSIS — M79.7 FIBROMYALGIA: ICD-10-CM

## 2024-02-01 PROCEDURE — 99213 OFFICE O/P EST LOW 20 MIN: CPT | Performed by: PHYSICAL MEDICINE & REHABILITATION

## 2024-03-07 ENCOUNTER — HOSPITAL ENCOUNTER (OUTPATIENT)
Facility: HOSPITAL | Age: 54
Setting detail: RECURRING SERIES
Discharge: HOME | End: 2024-03-10
Payer: MEDICARE

## 2024-03-07 ENCOUNTER — TELEPHONE (OUTPATIENT)
Age: 54
End: 2024-03-07

## 2024-03-07 PROCEDURE — 97162 PT EVAL MOD COMPLEX 30 MIN: CPT

## 2024-03-07 PROCEDURE — 97110 THERAPEUTIC EXERCISES: CPT

## 2024-03-07 PROCEDURE — 97535 SELF CARE MNGMENT TRAINING: CPT

## 2024-03-07 NOTE — PROGRESS NOTES
[facility]  [address] [place] [address] Phone: [phone] Fax   Plan of Care / Statement of Necessity for Physical Therapy Services     Patient Name: Stephanie Solomon : 1970   Medical   Diagnosis: Thoracic spine pain [M54.6] Treatment Diagnosis: M54.6  THORACIC PAIN   Onset Date: Chronic Payor: Payor: AET MEDICARE / Plan: [facility] MEDICARE / Product Type: *No Product type* /    Referral Source: Willy Bunch MD Start of Care (SOC): 3/7/2024   Prior Hospitalization: See medical history Provider #: [id number]   Prior Level of Function: Disability   Comorbidities: Allergies, anxiety/panic disorder, asthma, LBP, BMI >30, Depression, Diabetes, GERD, [place], HTN, CVA, Right Triquetral Fx, Neck Pain   Medications: Verified on Patient Summary List     Assessment / key information:  Patient is a 53 y.o. female presenting with CC Chronic T/S pain, no known cause. Patient has attributed symptoms to fibromyalgia and DDD. No radiating symptoms. C/S Screening (-). Limitations in T/S ROM and pain hinder ADL's. Pt  will benefit from physical therapist management to address her impairments (listed below),  educate her, and improve her level of function. Thanks for your referral.    Evaluation Complexity:  History:  HIGH Complexity :3+ comorbidities / personal factors will impact the outcome/ POC ; Examination:  HIGH Complexity : 4+ Standardized tests and measures addressing body structure, function, activity limitation and / or participation in recreation  ;Presentation:  MEDIUM Complexity : Evolving with changing characteristics  ;Clinical Decision Making:  MEDIUM Complexity : FOTO score of 26-74 FOTO score = an established functional score where 100 = no disability  Overall Complexity Rating: MEDIUM  Problem List: pain affecting function, decrease ROM, decrease strength, decrease ADL/functional abilities, 
billed concurrently with other procedures   [x] Review HEP    [] Progressed/Changed HEP  [] Other:    ASSESSMENT/PLAN    Patient will continue to benefit from skilled PT services to modify and progress therapeutic interventions, analyze and address functional mobility deficits, analyze and address ROM deficits, analyze and address strength deficits, analyze and address soft tissue restrictions, analyze and cue for proper movement patterns, and instruct in home and community integration to address functional deficits and attain remaining goals.    [x]  See Plan of Care - for goals and assessment     Cristiano \"BJ\" CHECO Moreno, Cert. MDT, Cert. DN, Cert. SMT, Dip. Osteopractic   3/7/2024  11:44 AM    Justification for Eval Code Complexity:  Patient History : high  Examination see exam high  Clinical Presentation: mod  Clinical Decision Making : FOTO : 39 /100

## 2024-03-07 NOTE — TELEPHONE ENCOUNTER
Patient called back and stated that [facility] told her she needs a pre-auth and they scheduled for 03/21/2024.

## 2024-03-07 NOTE — TELEPHONE ENCOUNTER
Patient states that she's currently at [facility] in [place] [facility] and they don't have her referral.    Patient can be reached at [phone].

## 2024-03-21 ENCOUNTER — APPOINTMENT (OUTPATIENT)
Facility: HOSPITAL | Age: 54
End: 2024-03-21
Payer: MEDICARE

## 2024-03-28 ENCOUNTER — OFFICE VISIT (OUTPATIENT)
Age: 54
End: 2024-03-28
Payer: MEDICARE

## 2024-03-28 VITALS
DIASTOLIC BLOOD PRESSURE: 69 MMHG | WEIGHT: 180.2 LBS | HEIGHT: 58 IN | RESPIRATION RATE: 18 BRPM | TEMPERATURE: 97.4 F | SYSTOLIC BLOOD PRESSURE: 109 MMHG | HEART RATE: 70 BPM | BODY MASS INDEX: 37.83 KG/M2

## 2024-03-28 DIAGNOSIS — M51.26 HNP (HERNIATED NUCLEUS PULPOSUS), LUMBAR: Primary | ICD-10-CM

## 2024-03-28 DIAGNOSIS — M79.7 FIBROMYALGIA: ICD-10-CM

## 2024-03-28 DIAGNOSIS — M54.6 THORACIC BACK PAIN, UNSPECIFIED BACK PAIN LATERALITY, UNSPECIFIED CHRONICITY: ICD-10-CM

## 2024-03-28 DIAGNOSIS — M51.36 DDD (DEGENERATIVE DISC DISEASE), LUMBAR: ICD-10-CM

## 2024-03-28 DIAGNOSIS — M54.16 LUMBAR NEURITIS: ICD-10-CM

## 2024-03-28 PROCEDURE — 99214 OFFICE O/P EST MOD 30 MIN: CPT | Performed by: PHYSICAL MEDICINE & REHABILITATION

## 2024-03-28 NOTE — PROGRESS NOTES
chronicity          IMPRESSION AND PLAN:  Patient returns to the office today with c/o thoracic and lumbar spine pain (at T10; burning sensation) (lumbar=thoracic). Multiple treatment options were discussed. She is not interested in surgery at this time. However, she will like to proceed with spinal injections. I will order a JENNA L4-5. We will review the PT referral status. If needed, patient will call and I will refer her to physical therapy with an emphasis on HEP once again. The patient is Neurologically intact. I will see the patient back after the Epidural or earlier if needed.     Written by Darrel Segovia, as dictated by Willy Bunch MD  I examined the patient, reviewed and agree with the note.

## 2024-04-18 ENCOUNTER — HOSPITAL ENCOUNTER (OUTPATIENT)
Facility: HOSPITAL | Age: 54
End: 2024-04-18
Payer: MEDICARE

## 2024-04-18 VITALS
TEMPERATURE: 97.6 F | DIASTOLIC BLOOD PRESSURE: 79 MMHG | HEART RATE: 90 BPM | OXYGEN SATURATION: 95 % | SYSTOLIC BLOOD PRESSURE: 126 MMHG | RESPIRATION RATE: 16 BRPM

## 2024-04-18 PROBLEM — M54.16 LUMBAR RADICULOPATHY: Status: ACTIVE | Noted: 2024-04-18

## 2024-04-18 PROCEDURE — 62323 NJX INTERLAMINAR LMBR/SAC: CPT | Performed by: PHYSICAL MEDICINE & REHABILITATION

## 2024-04-18 PROCEDURE — 2500000003 HC RX 250 WO HCPCS: Performed by: PHYSICAL MEDICINE & REHABILITATION

## 2024-04-18 PROCEDURE — 62323 NJX INTERLAMINAR LMBR/SAC: CPT

## 2024-04-18 PROCEDURE — 6370000000 HC RX 637 (ALT 250 FOR IP): Performed by: STUDENT IN AN ORGANIZED HEALTH CARE EDUCATION/TRAINING PROGRAM

## 2024-04-18 PROCEDURE — 6360000002 HC RX W HCPCS: Performed by: PHYSICAL MEDICINE & REHABILITATION

## 2024-04-18 RX ORDER — DIAZEPAM 5 MG/1
10 TABLET ORAL ONCE
Status: DISCONTINUED | OUTPATIENT
Start: 2024-04-23 | End: 2024-04-18

## 2024-04-18 RX ORDER — OMALIZUMAB 150 MG/ML
INJECTION, SOLUTION SUBCUTANEOUS
COMMUNITY
Start: 2024-04-12

## 2024-04-18 RX ORDER — OMALIZUMAB 75 MG/.5ML
INJECTION, SOLUTION SUBCUTANEOUS
COMMUNITY
Start: 2024-04-12

## 2024-04-18 RX ORDER — LIDOCAINE HYDROCHLORIDE 10 MG/ML
5-30 INJECTION, SOLUTION EPIDURAL; INFILTRATION; INTRACAUDAL; PERINEURAL ONCE
Status: COMPLETED | OUTPATIENT
Start: 2024-04-18 | End: 2024-04-18

## 2024-04-18 RX ORDER — DIAZEPAM 5 MG/1
10 TABLET ORAL ONCE
Status: COMPLETED | OUTPATIENT
Start: 2024-04-18 | End: 2024-04-18

## 2024-04-18 RX ORDER — DEXAMETHASONE SODIUM PHOSPHATE 10 MG/ML
10 INJECTION, SOLUTION INTRAMUSCULAR; INTRAVENOUS ONCE
Status: COMPLETED | OUTPATIENT
Start: 2024-04-18 | End: 2024-04-18

## 2024-04-18 RX ADMIN — LIDOCAINE HYDROCHLORIDE 12 ML: 10 INJECTION, SOLUTION EPIDURAL; INFILTRATION; INTRACAUDAL; PERINEURAL at 13:24

## 2024-04-18 RX ADMIN — DEXAMETHASONE SODIUM PHOSPHATE 10 MG: 10 INJECTION, SOLUTION INTRAMUSCULAR; INTRAVENOUS at 13:27

## 2024-04-18 RX ADMIN — DIAZEPAM 10 MG: 5 TABLET ORAL at 12:30

## 2024-04-18 ASSESSMENT — PAIN SCALES - GENERAL: PAINLEVEL_OUTOF10: 0

## 2024-04-18 ASSESSMENT — PAIN - FUNCTIONAL ASSESSMENT: PAIN_FUNCTIONAL_ASSESSMENT: 0-10

## 2024-04-18 ASSESSMENT — PAIN DESCRIPTION - DESCRIPTORS: DESCRIPTORS: SHARP;THROBBING;BURNING

## 2024-04-18 NOTE — OP NOTE
Intralaminar Epidural Steroid Block Procedure Note      Patient Name: Stephanie Solomon    Date of Procedure: April 18, 2024    Preoperative Diagnosis: * No pre-op diagnosis entered *    Post Operative Diagnosis: M54.15, M51.26    Procedure: L4-L5 Epidural Block     PROCEDURE:  Lumbar Epidural Block    Consent:  Informed  Consent was obtained prior to the procedure.  The patient was given the opportunity to ask questions regarding the procedure and its associated risks.  In addition to the potential risks associated with the procedure itself, the patient was informed both verbally and in writing of potential side effects of the use glucocorticoids.  The patient appeared to comprehend the informed consent and desired to have the procedure performed.      The patient was placed in the prone position on the fluoroscopy table and the back prepped and draped in the usual sterile manner.  The interlaminar space was identified using fluoroscopy and the skin anesthetized with 1% Lidocaine.  A #18 Tuohy Epidural needle was advanced under fluoroscopic control from a paramedian approach to the  epidural space as noted above, using the loss of resistance to fluid technique.    Then, 10 mg of preservative free Dexamethasone and 2 cc of Lidocaine was slowly injected.      The patient tolerated the procedure well.  The injection area was cleaned and band aids applied.  No excessive bleeding was noted.  Patient dressed and was discharged to home with instructions.

## 2024-05-30 ENCOUNTER — OFFICE VISIT (OUTPATIENT)
Age: 54
End: 2024-05-30
Payer: MEDICARE

## 2024-05-30 VITALS
HEIGHT: 58 IN | TEMPERATURE: 97.6 F | OXYGEN SATURATION: 96 % | BODY MASS INDEX: 37.57 KG/M2 | WEIGHT: 179 LBS | HEART RATE: 85 BPM

## 2024-05-30 DIAGNOSIS — M51.36 DDD (DEGENERATIVE DISC DISEASE), LUMBAR: ICD-10-CM

## 2024-05-30 DIAGNOSIS — M54.6 THORACIC BACK PAIN, UNSPECIFIED BACK PAIN LATERALITY, UNSPECIFIED CHRONICITY: ICD-10-CM

## 2024-05-30 DIAGNOSIS — M54.16 LUMBAR NEURITIS: Primary | ICD-10-CM

## 2024-05-30 DIAGNOSIS — M79.7 FIBROMYALGIA: ICD-10-CM

## 2024-05-30 DIAGNOSIS — M51.26 HNP (HERNIATED NUCLEUS PULPOSUS), LUMBAR: ICD-10-CM

## 2024-05-30 PROCEDURE — 99213 OFFICE O/P EST LOW 20 MIN: CPT | Performed by: PHYSICAL MEDICINE & REHABILITATION

## 2024-05-30 RX ORDER — DULAGLUTIDE 0.75 MG/.5ML
0.75 INJECTION, SOLUTION SUBCUTANEOUS WEEKLY
COMMUNITY
Start: 2024-05-20

## 2024-05-30 NOTE — PROGRESS NOTES
[facility]  [address]  [place] [address]  Tel: [phone]  Fax: [phone]          PROGRESS NOTE      HISTORY OF PRESENT ILLNESS:  The patient is a 54 y.o. female and was seen today for follow up of  thoracic spine pain (at T10; burning sensation) x 10/26/2023 and low back pain radiating into the BLE(L=R) in a circumferential distribution to the feet since 4/4/2023 without injury (low back pain=thoracic spine pain). Patient notes acute onset. Her pain is exacerbated by no position. Patient says baseline pain is a 3/10. Patient reports Bowel incontinence last wednesday but it is has been fine since then. Pt denies change in bowel or bladder habits. Patient denies recent fevers, weight loss, rashes or skin sores. Patient says she has lost 70 pounds in the past 5 years with diet and exercise. No stomach ulcers or bleeding disorders.No history of spinal surgery. Patient has had trigger point injections in her spine. Pt underwent L4-5 epidural on 10/26/2023 with benefit, resolution of BLE pain. Patient is done with PT for her lower back. Patient had no relief with the low back. She is compliant with her HEP. Pt denies DM. Patient reports that to her knowledge her kidneys function properly, GFR over 60 on 4/30/2024. Patient took Tylenol without relief, Lidoderm patches without relief, naproxen without relief, and Flexeril without relief. Patient said she had surgeries for her bladder, uterine, and cervical cancer. Patient denies chemotherapy or radiation for her previous cancers. Patient says she took prednisone over a year ago and says she tolerated it. Patient also tolerated the Naproxen when she took it recently. Patient is not followed by any for her bipolar disorder. Patient had a right wrist surgery on 6/12/2023 by Dr. Robert. ER note from Horacio Fuentes MD dated 1/3/2024 indicating she presented for diffuse pain complaints.  Patient has an extensive

## 2024-06-06 ENCOUNTER — OFFICE VISIT (OUTPATIENT)
Age: 54
End: 2024-06-06

## 2024-06-06 VITALS — HEIGHT: 58 IN | BODY MASS INDEX: 37.57 KG/M2 | WEIGHT: 179 LBS

## 2024-06-06 DIAGNOSIS — M18.12 ARTHRITIS OF CARPOMETACARPAL (CMC) JOINT OF LEFT THUMB: Primary | ICD-10-CM

## 2024-06-06 DIAGNOSIS — G56.02 LEFT CARPAL TUNNEL SYNDROME: ICD-10-CM

## 2024-06-06 RX ORDER — LIDOCAINE HYDROCHLORIDE 10 MG/ML
0.5 INJECTION, SOLUTION INFILTRATION; PERINEURAL ONCE
Status: COMPLETED | OUTPATIENT
Start: 2024-06-06 | End: 2024-06-06

## 2024-06-06 RX ADMIN — LIDOCAINE HYDROCHLORIDE 0.5 ML: 10 INJECTION, SOLUTION INFILTRATION; PERINEURAL at 14:09

## 2024-06-06 NOTE — PROGRESS NOTES
Stephanie Solomon is a 54 y.o. female right handed individual, not currently working.  Worker's Compensation and legal considerations: none    Chief Complaint   Patient presents with    Hand Pain      L hand Pain       Pain Score:  10 - Worst pain ever    HPI: Patient presents today with a new problem of left thumb base pain as well as left hand numbness and tingling.    1/5/2024 HPI: Patient presents today with new complaints of hand pain and continued stiffness.  She recently had a distal radius ORIF in July 2023 by a trauma surgeon at [facility].  She completed therapy.  She has seen the trauma surgeon in the past couple weeks and reports that he said there is nothing else to do but continue exercises at home.  She reports falling recently and had an x-ray done at [facility] that was negative.     Initial HPI: Patient presents with + EMG for carpal tunnel on right.  She has a history of fibromyalgia.  She is requesting to just have it \"fixed\" and does not thing a steroid injection will work.    Date of onset: Chronic  Injury: Yes: Comment: Summer 2023 resulting in distal radius ORIF  Prior Treatment:  Yes: Comment: Right distal radius ORIF by another provider.  Right carpal tunnel release    ROS: Review of Systems - General ROS: negative except HPI    Past Medical History:   Diagnosis Date    Anxiety     Asthma     Controlled    Bipolar 1 disorder ([facility])     Bladder cancer ([facility]) 2002    bladder CA, cervical CA, skin CA    Cervical ca ([facility]) 1984    CTS (carpal tunnel syndrome) 06/2021    Distal radial fracture     DVT (deep venous thrombosis) ([facility])     x1    Eczema     Fatty liver     Fibromyalgia     Hiatal hernia     History of bladder cancer     Nocturia     BENJI (obstructive sleep apnea)     didn't like CPAP    PE (pulmonary thromboembolism) ([facility]) 1996 , 2012    x 3    Pneumonia     Right wrist pain     Squamous cell carcinoma of left lower leg     Calf and thigh    TIA (transient ischemic attack) 2012    Uterine

## 2024-08-20 ENCOUNTER — PROCEDURE VISIT (OUTPATIENT)
Age: 54
End: 2024-08-20

## 2024-08-20 VITALS — HEIGHT: 58 IN | WEIGHT: 175 LBS | RESPIRATION RATE: 14 BRPM | BODY MASS INDEX: 36.73 KG/M2

## 2024-08-20 DIAGNOSIS — R94.131 ABNORMAL EMG: ICD-10-CM

## 2024-08-20 DIAGNOSIS — R20.2 NUMBNESS AND TINGLING IN LEFT HAND: Primary | ICD-10-CM

## 2024-08-20 DIAGNOSIS — R20.0 NUMBNESS AND TINGLING IN LEFT HAND: Primary | ICD-10-CM

## 2024-08-20 DIAGNOSIS — G56.02 CARPAL TUNNEL SYNDROME OF LEFT WRIST: ICD-10-CM

## 2024-08-20 NOTE — PROGRESS NOTES
[facility]  [facility] [facility], [address]  [place] [address]  Phone: [phone]  Fax: [phone]    Stephanie Solomon  : 1970  PCP: Lukas Arvizu MD  2024    ELECTROMYOGRAPHY AND NERVE CONDUCTION STUDIES    Stephanie Solomon was referred by Dr. Cline for electrodiagnostic evaluation of numbness/tingling of left hand.    NCV & EMG Findings:  Evaluation of the left median sensory nerve showed decreased conduction velocity (38 m/s).  The left median-ulnar (dig IV) sensory nerve showed abnormal peak latency difference ((Median Wrist-Dig IV)-(Ulnar Wrist-Dig IV), 0.50 ms).  All remaining nerves (as indicated in the following tables) were within normal limits    All examined muscles (as indicated in the following table) showed no evidence of electrical instability    INTERPRETATION  This is an abnormal electrodiagnostic examination. 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        CLINICAL INTERPRETATION  Her electrodiagnostic finding of median mononeuropathy at the left wrist appears consistent with some of her left hand symptoms.       HISTORY OF PRESENT ILLNESS  Stephanie Solomon is a 54 y.o. female.    Pt presents today with LUE EMG evaluation for numbness/tingling of left hand.    PAST MEDICAL HISTORY   Past Medical History:   Diagnosis Date    Anxiety     Asthma     Controlled    Bipolar 1 disorder ([facility])     Bladder cancer ([facility])     bladder CA, cervical CA, skin CA    Cervical ca (HCC)     CTS (carpal tunnel syndrome) 2021    Deep vein thrombosis (HCC)     Distal radial fracture     DVT (deep venous thrombosis) (H[facility])     x1    Eczema     Fatty liver     Fibromyalgia     Fibromyositis     GERD (gastroesophageal reflux disease)

## 2024-08-23 ENCOUNTER — OFFICE VISIT (OUTPATIENT)
Age: 54
End: 2024-08-23

## 2024-08-23 VITALS — BODY MASS INDEX: 37.28 KG/M2 | HEIGHT: 58 IN | WEIGHT: 177.6 LBS

## 2024-08-23 DIAGNOSIS — S52.501S: ICD-10-CM

## 2024-08-23 DIAGNOSIS — M18.12 ARTHRITIS OF CARPOMETACARPAL (CMC) JOINT OF LEFT THUMB: ICD-10-CM

## 2024-08-23 DIAGNOSIS — Z87.81 S/P ORIF (OPEN REDUCTION INTERNAL FIXATION) FRACTURE: ICD-10-CM

## 2024-08-23 DIAGNOSIS — Z98.890 S/P ORIF (OPEN REDUCTION INTERNAL FIXATION) FRACTURE: ICD-10-CM

## 2024-08-23 DIAGNOSIS — M65.9 FCR (FLEXOR CARPI RADIALIS) TENOSYNOVITIS: ICD-10-CM

## 2024-08-23 DIAGNOSIS — G56.02 LEFT CARPAL TUNNEL SYNDROME: Primary | ICD-10-CM

## 2024-08-23 RX ORDER — LIDOCAINE HYDROCHLORIDE 10 MG/ML
1 INJECTION, SOLUTION INFILTRATION; PERINEURAL ONCE
Status: COMPLETED | OUTPATIENT
Start: 2024-08-23 | End: 2024-08-23

## 2024-08-23 RX ADMIN — LIDOCAINE HYDROCHLORIDE 1 ML: 10 INJECTION, SOLUTION INFILTRATION; PERINEURAL at 11:45

## 2024-08-23 NOTE — PROGRESS NOTES
some significant inflammation and possibly tendinitis status post open reduction internal fixation of her distal radius fracture last summer by another physician.  Since her last visit she has tried to make an appointment with this physician to try and have the plate removed however they have informed her that they do not take her insurance.    We will trial left carpal tunnel injection before proceeding with operative treatment.  However the patient did not respond to a right carpal tunnel injection in the past and surgery resolved her numbness and tingling on the right side.    Observation for left thumb CMC arthritis status post injection 2 months ago as patient appears to be minimally symptomatic in this area.    The above EMG has been independently reviewed and results and findings discussed with patient.    Return if symptoms worsen or fail to improve.     Plan was reviewed with patient, who verbalized agreement and understanding of the plan    [facility] - [address], [address]  [facility] [address]   OFFICE PROCEDURE PROGRESS NOTE        Chart reviewed for the following:   Edouard MORAN DO, have reviewed the History, Physical and updated the Allergic reactions for Stephanie Solomon     TIME OUT performed immediately prior to start of procedure:   Edouard MORAN DO, have performed the following reviews on Stephanie ALFA Wilsonte prior to the start of the procedure:            * Patient was identified by name and date of birth   * Agreement on procedure being performed was verified  * Risks and Benefits explained to the patient  * Procedure site verified and marked as necessary  * Patient was positioned for comfort  * Consent was signed and verified      Procedure performed by:  Edouard Cline DO    Patient assisted by: self    How tolerated by patient: tolerated    Post Procedural Pain Scale:0    Comments: none    Procedure:  After

## 2024-12-12 ENCOUNTER — OFFICE VISIT (OUTPATIENT)
Age: 54
End: 2024-12-12
Payer: MEDICARE

## 2024-12-12 VITALS
WEIGHT: 180 LBS | OXYGEN SATURATION: 98 % | BODY MASS INDEX: 37.79 KG/M2 | HEART RATE: 90 BPM | HEIGHT: 58 IN | TEMPERATURE: 98 F

## 2024-12-12 DIAGNOSIS — M51.26 HNP (HERNIATED NUCLEUS PULPOSUS), LUMBAR: ICD-10-CM

## 2024-12-12 DIAGNOSIS — M54.6 THORACIC BACK PAIN, UNSPECIFIED BACK PAIN LATERALITY, UNSPECIFIED CHRONICITY: ICD-10-CM

## 2024-12-12 DIAGNOSIS — M51.361 DEGENERATION OF INTERVERTEBRAL DISC OF LUMBAR REGION WITH LOWER EXTREMITY PAIN: ICD-10-CM

## 2024-12-12 DIAGNOSIS — M54.16 LUMBAR NEURITIS: Primary | ICD-10-CM

## 2024-12-12 DIAGNOSIS — M79.7 FIBROMYALGIA: ICD-10-CM

## 2024-12-12 PROCEDURE — 99214 OFFICE O/P EST MOD 30 MIN: CPT | Performed by: PHYSICAL MEDICINE & REHABILITATION

## 2024-12-12 NOTE — PROGRESS NOTES
Social History     Socioeconomic History    Marital status:      Spouse name: None    Number of children: None    Years of education: None    Highest education level: None   Tobacco Use    Smoking status: Former     Current packs/day: 0.00     Types: Cigarettes     Quit date: 2018     Years since quittin.9    Smokeless tobacco: Never   Substance and Sexual Activity    Alcohol use: No    Drug use: No       Current Outpatient Medications   Medication Sig Dispense Refill    TRULICITY 0.75 MG/0.5ML SOPN SC injection Inject 0.5 mLs into the skin once a week      XOLAIR 150 MG/ML SOSY injection       XOLAIR 75 MG/0.5ML SOSY injection       ONETOUCH ULTRA strip       ondansetron (ZOFRAN-ODT) 4 MG disintegrating tablet Place 1 tablet under the tongue every 8 hours as needed      dilTIAZem (DILACOR XR) 180 MG extended release capsule Take by mouth daily      pantoprazole (PROTONIX) 40 MG tablet TAKE 1 TABLET BY MOUTH 2 TIMES A DAY BEFORE MEALS.      ACETAMINOPHEN EXTRA STRENGTH 500 MG tablet       Cholecalciferol (VITAMIN D3) 50 MCG (2000 UT) CAPS TAKE 2 CAPSULE BY MOUTH EVERY DAY      rosuvastatin (CRESTOR) 5 MG tablet TAKE 1 TABLET BY MOUTH EVERYDAY AT BEDTIME      albuterol sulfate HFA (PROVENTIL;VENTOLIN;PROAIR) 108 (90 Base) MCG/ACT inhaler Inhale 2 puffs into the lungs every 4 hours as needed      calcium carbonate (OS-TRAMAINE) 1250 (500 Ca) MG chewable tablet Take 2 tablets by mouth daily as needed      cyclobenzaprine (FLEXERIL) 10 MG tablet Take 1 tablet by mouth 3 times daily as needed      dupilumab (DUPIXENT) 300 MG/2ML SOSY injection Inject 2 mLs into the skin every 14 days      EPINEPHrine (EPIPEN) 0.3 MG/0.3ML SOAJ injection Inject 0.3 mLs into the muscle once as needed      LORazepam (ATIVAN) 1 MG tablet Take 1 tablet by mouth 3 times daily.      Melatonin 10 MG TABS Take 1 tablet by mouth      omalizumab (XOLAIR) 150 MG injection Inject 375 mg into the skin every 14 days      ramelteon

## 2025-01-09 ENCOUNTER — TELEPHONE (OUTPATIENT)
Age: 55
End: 2025-01-09

## 2025-01-09 DIAGNOSIS — M54.6 THORACIC BACK PAIN, UNSPECIFIED BACK PAIN LATERALITY, UNSPECIFIED CHRONICITY: ICD-10-CM

## 2025-01-09 DIAGNOSIS — M79.7 FIBROMYALGIA: ICD-10-CM

## 2025-01-09 DIAGNOSIS — M54.16 LUMBAR NEURITIS: Primary | ICD-10-CM

## 2025-01-09 DIAGNOSIS — M51.361 DEGENERATION OF INTERVERTEBRAL DISC OF LUMBAR REGION WITH LOWER EXTREMITY PAIN: ICD-10-CM

## 2025-01-09 DIAGNOSIS — M51.26 HNP (HERNIATED NUCLEUS PULPOSUS), LUMBAR: ICD-10-CM

## 2025-03-13 ENCOUNTER — OFFICE VISIT (OUTPATIENT)
Age: 55
End: 2025-03-13

## 2025-03-13 VITALS — HEIGHT: 58 IN | WEIGHT: 184 LBS | BODY MASS INDEX: 38.62 KG/M2

## 2025-03-13 DIAGNOSIS — M25.612 DECREASED RANGE OF MOTION OF SHOULDER, LEFT: ICD-10-CM

## 2025-03-13 DIAGNOSIS — M25.512 ACUTE PAIN OF LEFT SHOULDER: Primary | ICD-10-CM
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
## 2025-03-13 DIAGNOSIS — M75.52 SUBACROMIAL BURSITIS OF LEFT SHOULDER JOINT: ICD-10-CM

## 2025-03-13 DIAGNOSIS — M25.812 IMPINGEMENT OF LEFT SHOULDER: ICD-10-CM

## 2025-03-13 RX ORDER — BUPIVACAINE HYDROCHLORIDE 5 MG/ML
7 INJECTION, SOLUTION PERINEURAL ONCE
Status: COMPLETED | OUTPATIENT
Start: 2025-03-13 | End: 2025-03-13

## 2025-03-13 RX ORDER — TRIAMCINOLONE ACETONIDE 40 MG/ML
80 INJECTION, SUSPENSION INTRA-ARTICULAR; INTRAMUSCULAR ONCE
Status: COMPLETED | OUTPATIENT
Start: 2025-03-13 | End: 2025-03-13

## 2025-03-13 RX ADMIN — TRIAMCINOLONE ACETONIDE 80 MG: 40 INJECTION, SUSPENSION INTRA-ARTICULAR; INTRAMUSCULAR at 11:10

## 2025-03-13 RX ADMIN — BUPIVACAINE HYDROCHLORIDE 35 MG: 5 INJECTION, SOLUTION PERINEURAL at 11:09

## 2025-03-13 NOTE — PROGRESS NOTES
[facility] AMBULATORY OFFICE NOTE    Patient: Stephanie Solomon                MRN: [id number]       SSN: [id number]  YOB: 1970        AGE: 55 y.o.        SEX: female      OFFICE NOTE DICTATED    PCP: Lukas Arvizu MD  03/13/25    Chief Complaint   Patient presents with    Shoulder Pain     Left         HISTORY:    Stephanie Solomon is a 55 y.o. female Presents to the office endorsing acute  onset of pain to the left shoulder after a fall on 31 January 2025.  Patient was into the emergency department following the incident and x-ray was obtained which revealed no acute fracture or dislocations.  She has a history documented of fibromyalgia.  She is not taking any anti-inflammatory medication at this time by rec mentation from her PCP.  She does use Flexeril 10 mg tablets up to 3 times a day for assistance with fibromyalgia   Symptoms.      she has difficulty reaching above her breast line and behind her back  since the fall.  No prior difficulty with her shoulder prior to this incident.    No results found for: \"HBA1C\", \"VOW7DPVM\"      3/13/2025    10:21 AM 12/12/2024     9:53 AM 8/23/2024    10:59 AM 8/20/2024     4:15 PM 8/15/2024     7:15 AM 6/6/2024    12:56 PM 5/30/2024     9:41 AM   Weight Metrics   Weight 184 lb 180 lb 177 lb 9.6 oz 175 lb 176 lb 179 lb 179 lb   BMI (Calculated) 38.5 kg/m2 37.7 kg/m2 37.2 kg/m2 36.7 kg/m2 36.9 kg/m2 37.5 kg/m2 37.5 kg/m2          Problem List Items Addressed This Visit    None  Visit Diagnoses         Acute pain of left shoulder    -  Primary    Relevant Orders    [63480] Shoulder 2V or more (Completed)      Decreased range of motion of shoulder, left          Impingement of left shoulder          Subacromial bursitis of left shoulder joint          Fall, initial encounter                PAST MEDICAL HISTORY:       Diagnosis Date    Anxiety     Asthma     Controlled    Bipolar 1 disorder (HCC)     Bladder cancer (HCC) 2002  ASSESSMENT NOTE    Attending Physician: No att. providers found  Admit Problem: MSSA bacteremia [R78.81, B95.61]  Date/Time of Admission: 12/24/2022  5:57 PM  Problem List:  Patient Active Problem List   Diagnosis    GERD with stricture    Type 2 diabetes mellitus with hyperglycemia, with long-term current use of insulin (HCC)    Sphincter of Oddi dysfunction    Iron deficiency anemia    S/P exploratory laparotomy    Chronic pancreatitis ([facility]    Hypertension    Class 1 obesity with serious comorbidity and body mass index (BMI) of 31.0 to 31.9 in adult    Hypokalemia due to excessive gastrointestinal loss of potassium    Microcytic anemia    S/P balloon dilatation of esophageal stricture    Infection of PEG site ([facility]    Gastroparesis due to DM ([facility]    MSSA bacteremia    Anemia    Pulmonary hypertension [facility])       Service Assessment  Patient Orientation Alert and Oriented   Cognition Alert   History Provided By Medical Record   Primary Caregiver Self   Accompanied By/Relationship     Support Systems Spouse/Significant Other   Patient's Healthcare Decision Maker is: Legal Next of Kin   PCP Verified by CM     Last Visit to PCP     Prior Functional Level Independent in ADLs/IADLs   Current Functional Level Independent in ADLs/IADLs   Can patient return to prior living arrangement Yes   Ability to make needs known: Good   Family able to assist with home care needs: Yes   Would you like for me to discuss the discharge plan with any other family members/significant others, and if so, who?  No   Financial Resources [employer]     CM/SW Referral Other (see comment) (possible OP IV Abx with [facility])     Social/Functional History  Lives With Spouse   Type of [address] [place] [address] Access     Entrance Stairs - Number of Steps     Entrance Stairs - Rails     Bathroom Shower/Tub     Bathroom Toilet Standard   [address] Help From [address]   Meal Prep     [address] [facility] Work     Driving     Shopping          Other (Comment)     Homemaking Responsibilities     Meal Prep Responsibility     [address] Paying/Finance Responsibility     Shopping Responsibility     Dependent Care Responsibility     Health Care Management     Other (Comment)     Ambulation Assistance Independent   Transfer Assistance Independent   Active      Patient's  Info     Mode of Transportation     Education     Occupation On disability   Type of Occupation       Discharge Planning   Type of [place] Prior To Admission None   Potential Assistance Needed Other (Comment) (OP IV Abx)   DME     DME     DME Ordered? Potential Assistance Purchasing Medications No   Meds-to-Beds: Does the patient want to have any new prescriptions delivered to bedside prior to discharge? Type of Home Care Services None   Patient expects to be discharged to: House   Follow Up Appointment: Best Day/Time     One/Two Story Residence:     # of Interior Steps     Height of Each Step (in)     [employer] Available     History of Falls? Services At/After Discharge  Transition of Care Consult (CM Consult): (P) Home Health ([facility])   Internal Home Health     Internal Hospice     Reason Outside Agency [address]     Partner SNF     Reason Why Partner SNF Not Chosen     Internal Comfort Care     Reason Outside [address] Discharge IV Therapy ([facility])   [employer] Information Provided?  No   Mode of Transport at Discharge [address] [facility] Time of Discharge     Confirm Follow Up Transport Family     Condition of Participation: Discharge Planning  The plan for Transition of Care is related to the following treatment goals: ongoing therpy needs   The Patient and/or Patient Representative was provided with a Choice of Provider? Patient   Name of the Patient Representative who was provided with the Choice of Provider and agrees with the Discharge Plan? The Patient and/or Patient Representative Agree with the Discharge Plan? Yes   Freedom of Choice list was provided with basic dialogue that supports the individualized plan of care/goals, treatment preferences, and shares the quality data associated with the providers? Yes     Milestones met    Patient discharged home with Home Infusion with [facility] and PICC line site management with [facility]  No further  needs/supportive care orders received for CM at this time.   Pt will have close follow up with PCP        Daniela Rowland RN 12/30/22 8:53 PM

## 2025-04-11 ENCOUNTER — OFFICE VISIT (OUTPATIENT)
Age: 55
End: 2025-04-11
Payer: MEDICARE

## 2025-04-11 VITALS
SYSTOLIC BLOOD PRESSURE: 114 MMHG | HEIGHT: 58 IN | DIASTOLIC BLOOD PRESSURE: 76 MMHG | WEIGHT: 181 LBS | BODY MASS INDEX: 37.99 KG/M2

## 2025-04-11 DIAGNOSIS — G56.02 LEFT CARPAL TUNNEL SYNDROME: ICD-10-CM

## 2025-04-11 DIAGNOSIS — G56.02 LEFT CARPAL TUNNEL SYNDROME: Primary | ICD-10-CM

## 2025-04-11 DIAGNOSIS — Z01.818 PREOP EXAMINATION: Primary | ICD-10-CM

## 2025-04-11 PROCEDURE — 99214 OFFICE O/P EST MOD 30 MIN: CPT | Performed by: ORTHOPAEDIC SURGERY

## 2025-04-11 RX ORDER — FLUTICASONE PROPIONATE AND SALMETEROL XINAFOATE 230; 21 UG/1; UG/1
2 AEROSOL, METERED RESPIRATORY (INHALATION) 2 TIMES DAILY
COMMUNITY
Start: 2025-02-10

## 2025-04-11 RX ORDER — SEMAGLUTIDE 0.68 MG/ML
INJECTION, SOLUTION SUBCUTANEOUS
COMMUNITY
Start: 2025-03-21

## 2025-04-11 RX ORDER — ASPIRIN 81 MG/1
81 TABLET ORAL DAILY
COMMUNITY

## 2025-04-11 RX ORDER — IPRATROPIUM BROMIDE AND ALBUTEROL SULFATE 2.5; .5 MG/3ML; MG/3ML
1 SOLUTION RESPIRATORY (INHALATION)
COMMUNITY
Start: 2025-01-13

## 2025-04-11 RX ORDER — LIFITEGRAST 50 MG/ML
1 SOLUTION/ DROPS OPHTHALMIC 2 TIMES DAILY
COMMUNITY
Start: 2025-03-07

## 2025-04-11 RX ORDER — AMINO AC/WHEY PROT CONC, ISOL 26G-150/39
200 POWDER (GRAM) ORAL DAILY
COMMUNITY
Start: 2025-03-10

## 2025-04-11 NOTE — PROGRESS NOTES
self    How tolerated by patient: tolerated    Post Procedural Pain Scale:0    Comments: none    Procedure:  After consent was obtained, using sterile technique the left carpal tunnel and right wrist was prepped. Local anesthetic used: 1% Lidocaine Kenalog 5 mg x2 and was then injected and the needle withdrawn.  The procedure was well tolerated.  The patient is asked to continue to rest the area for a few more days before resuming regular activities.  It may be more painful for the first 1-2 days.  Watch for fever, or increased swelling or persistent pain in the joint. Call or return to clinic prn if such symptoms occur or there is failure to improve as anticipated.      Note: This note was completed using voice recognition software.  Any typographical/name errors or mistakes are unintentional.

## 2025-04-15 ENCOUNTER — HOSPITAL ENCOUNTER (OUTPATIENT)
Facility: HOSPITAL | Age: 55
Discharge: HOME | End: 2025-04-18
Payer: MEDICARE

## 2025-04-15 DIAGNOSIS — Z01.818 PREOP EXAMINATION: ICD-10-CM

## 2025-04-15 DIAGNOSIS — G56.02 LEFT CARPAL TUNNEL SYNDROME: ICD-10-CM

## 2025-04-15 LAB
ALBUMIN SERPL-MCNC: 3.2 G/DL (ref 3.4–5)
ALBUMIN/GLOB SERPL: 1 (ref 0.8–1.7)
ALP SERPL-CCNC: 133 U/L (ref 45–117)
ALT SERPL-CCNC: 66 U/L (ref 13–56)
ANION GAP SERPL CALC-SCNC: 6 MMOL/L (ref 3–18)
AST SERPL-CCNC: 28 U/L (ref 10–38)
BASOPHILS # BLD: 0.06 K/UL (ref 0–0.1)
BASOPHILS NFR BLD: 0.6 % (ref 0–2)
BILIRUB SERPL-MCNC: 0.3 MG/DL (ref 0.2–1)
BUN SERPL-MCNC: 8 MG/DL (ref 7–18)
BUN/CREAT SERPL: 13 (ref 12–20)
CALCIUM SERPL-MCNC: 9.2 MG/DL (ref 8.5–10.1)
CHLORIDE SERPL-SCNC: 106 MMOL/L (ref 100–111)
CO2 SERPL-SCNC: 29 MMOL/L (ref 21–32)
CREAT SERPL-MCNC: 0.62 MG/DL (ref 0.6–1.3)
DIFFERENTIAL METHOD BLD: ABNORMAL
EOSINOPHIL # BLD: 0.34 K/UL (ref 0–0.4)
EOSINOPHIL NFR BLD: 3.3 % (ref 0–5)
ERYTHROCYTE [DISTWIDTH] IN BLOOD BY AUTOMATED COUNT: 17.9 % (ref 11.6–14.5)
GLOBULIN SER CALC-MCNC: 3.2 G/DL (ref 2–4)
GLUCOSE SERPL-MCNC: 87 MG/DL (ref 74–99)
HCT VFR BLD AUTO: 40.4 % (ref 35–45)
HGB BLD-MCNC: 12.3 G/DL (ref 12–16)
IMM GRANULOCYTES # BLD AUTO: 0.13 K/UL (ref 0–0.04)
IMM GRANULOCYTES NFR BLD AUTO: 1.3 % (ref 0–0.5)
LYMPHOCYTES # BLD: 2.67 K/UL (ref 0.9–3.6)
LYMPHOCYTES NFR BLD: 25.9 % (ref 21–52)
MCH RBC QN AUTO: 24.9 PG (ref 24–34)
MCHC RBC AUTO-ENTMCNC: 30.4 G/DL (ref 31–37)
MCV RBC AUTO: 81.9 FL (ref 78–100)
MONOCYTES # BLD: 0.73 K/UL (ref 0.05–1.2)
MONOCYTES NFR BLD: 7.1 % (ref 3–10)
NEUTS SEG # BLD: 6.37 K/UL (ref 1.8–8)
NEUTS SEG NFR BLD: 61.8 % (ref 40–73)
NRBC # BLD: 0 K/UL (ref 0–0.01)
NRBC BLD-RTO: 0 PER 100 WBC
PLATELET # BLD AUTO: 393 K/UL (ref 135–420)
PMV BLD AUTO: 9.7 FL (ref 9.2–11.8)
POTASSIUM SERPL-SCNC: 3.8 MMOL/L (ref 3.5–5.5)
PROT SERPL-MCNC: 6.4 G/DL (ref 6.4–8.2)
RBC # BLD AUTO: 4.93 M/UL (ref 4.2–5.3)
SODIUM SERPL-SCNC: 141 MMOL/L (ref 136–145)
WBC # BLD AUTO: 10.3 K/UL (ref 4.6–13.2)

## 2025-04-15 PROCEDURE — 85025 COMPLETE CBC W/AUTO DIFF WBC: CPT

## 2025-04-15 PROCEDURE — 80053 COMPREHEN METABOLIC PANEL: CPT

## 2025-04-15 PROCEDURE — 36415 COLL VENOUS BLD VENIPUNCTURE: CPT

## 2025-04-16 ENCOUNTER — PREP FOR PROCEDURE (OUTPATIENT)
Age: 55
End: 2025-04-16

## 2025-04-16 DIAGNOSIS — G56.02 CARPAL TUNNEL SYNDROME, LEFT: ICD-10-CM

## 2025-04-17 RX ORDER — ERGOCALCIFEROL 1.25 MG/1
50000 CAPSULE, LIQUID FILLED ORAL
COMMUNITY
Start: 2024-10-28

## 2025-04-17 RX ORDER — ONDANSETRON 4 MG/1
TABLET, FILM COATED ORAL
COMMUNITY
Start: 2025-02-25

## 2025-04-17 NOTE — PERIOP NOTE
Instructions for your surgery at [facility]      Today's Date:  4/17/2025      Patient's Name:  Stephanie Solomon           Surgery Date:  4/29/2025              Please enter the main entrance of the hospital and check-in at the front security desk located in the lobby. They will direct you to the area to report for your surgery.     Do NOT eat or drink anything, including candy, gum, or ice chips after midnight prior to your surgery, unless you have specific instructions from your surgeon or anesthesia provider to do so.  Brush your teeth before coming to the hospital. You may swish with water, but do not swallow.  No smoking/Vaping/E-Cigarettes 24 hours prior to the day of surgery.  No alcohol 24 hours prior to the day of surgery.  No recreational drugs for one week prior to the day of surgery.  Bring Photo ID, Insurance information, and Co-pay if required on day of surgery.  Bring in pertinent legal documents, such as, Medical Power of , DNR, Advance Directive, etc.  Leave all valuables, including money/purse, weapons at home.  Remove all jewelry, including ALL body piercings, nail polish, acrylic nails, and makeup (including mascara); no lotions, powders, deodorant, or perfume/cologne/after shave on the skin.  Follow instruction for Hibiclens washes and CHG wipes from surgeon's office.   Glasses and dentures may be worn to the hospital. They must be removed prior to surgery. Please bring case/container for glasses or dentures.   Contact lenses should not be worn on day of surgery.   Call your doctor's office if symptoms of a cold or illness develop within 24-48 hours prior to your surgery.  Call your doctor's office if you have any questions concerning insurance or co-pays.  15. AN ADULT (relative or friend 18 years or older) MUST DRIVE YOU HOME AFTER YOUR SURGERY.  16. Please make arrangements for a responsible adult (18 years or older) to be with you for 24 hours after your

## 2025-04-24 DIAGNOSIS — E11.9 DIABETES MELLITUS WITHOUT COMPLICATION (HCC): ICD-10-CM

## 2025-04-24 DIAGNOSIS — Z01.818 PREOP EXAMINATION: Primary | ICD-10-CM

## 2025-04-25 ENCOUNTER — HOSPITAL ENCOUNTER (OUTPATIENT)
Facility: HOSPITAL | Age: 55
Discharge: HOME | End: 2025-04-28
Payer: MEDICARE

## 2025-04-25 DIAGNOSIS — Z01.818 PREOP EXAMINATION: ICD-10-CM

## 2025-04-25 DIAGNOSIS — E11.9 DIABETES MELLITUS WITHOUT COMPLICATION (HCC): ICD-10-CM

## 2025-04-25 LAB
EST. AVERAGE GLUCOSE BLD GHB EST-MCNC: 126 MG/DL
HBA1C MFR BLD: 6 % (ref 4.2–5.6)

## 2025-04-25 PROCEDURE — 36415 COLL VENOUS BLD VENIPUNCTURE: CPT

## 2025-04-25 PROCEDURE — 83036 HEMOGLOBIN GLYCOSYLATED A1C: CPT

## 2025-04-28 ENCOUNTER — ANESTHESIA EVENT (OUTPATIENT)
Facility: HOSPITAL | Age: 55
End: 2025-04-28
Payer: MEDICARE

## 2025-04-28 NOTE — DISCHARGE INSTRUCTIONS
Do not remove dressing for 3 days    Keep dressing clean and dry. Cover for showering.    Ice the wound/dressing multiple times per day to help with swelling    Elevate operative wound/dressing    Take post operative medications as indicated on the prescription label    Begin moving fingers immediately after surgery and continue moving fingers into a fist every hour that you're awake     You may experience significant bruising after surgery and it may extend up to your elbow. This is very common.     The following only applies to you if you are instructed to remove your bandage after 3 days:   Do not apply Peroxide, Neosporin, Vitamin E oil, or any ointment to the area.   Please wash the incisions gently with ANTI-BACTERIAL SOAP and WATER only.   Do not submerge in bath tub or dirty dish water  Cover incision with a band-aid as needed, but not all the time  Ok to shower as normal after bandage comes off    Reasons to call our office:  You remove the bandage (only if instructed) and notice the incision(s) is/are red, hot, warm, draining yellow/green/brown and appears infected.  You are having an allergic reaction to post operative medications   Your pain is significantly uncontrolled on the prescribed post operative regimen        DISCHARGE SUMMARY from Nurse    PATIENT INSTRUCTIONS:    After general anesthesia or intravenous sedation, for 24 hours or while taking prescription Narcotics:  Limit your activities  Do not drive and operate hazardous machinery  Do not make important personal or business decisions  Do  not drink alcoholic beverages  If you have not urinated within 8 hours after discharge, please contact your surgeon on call.    Report the following to your surgeon:  Excessive pain, swelling, redness or odor of or around the surgical area  Temperature over 100.5  Nausea and vomiting lasting longer than 4 hours or if unable to take medications  Any signs of decreased circulation or nerve impairment to

## 2025-04-29 ENCOUNTER — HOSPITAL ENCOUNTER (OUTPATIENT)
Facility: HOSPITAL | Age: 55
Setting detail: OUTPATIENT SURGERY
Discharge: HOME | End: 2025-04-29
Attending: ORTHOPAEDIC SURGERY | Admitting: ORTHOPAEDIC SURGERY
Payer: MEDICARE

## 2025-04-29 ENCOUNTER — ANESTHESIA (OUTPATIENT)
Facility: HOSPITAL | Age: 55
End: 2025-04-29
Payer: MEDICARE

## 2025-04-29 VITALS
TEMPERATURE: 97.5 F | HEART RATE: 83 BPM | OXYGEN SATURATION: 95 % | WEIGHT: 181.8 LBS | DIASTOLIC BLOOD PRESSURE: 85 MMHG | SYSTOLIC BLOOD PRESSURE: 134 MMHG | RESPIRATION RATE: 16 BRPM | BODY MASS INDEX: 38.16 KG/M2 | HEIGHT: 58 IN

## 2025-04-29 DIAGNOSIS — G56.02 CARPAL TUNNEL SYNDROME, LEFT: Primary | ICD-10-CM

## 2025-04-29 LAB — GLUCOSE BLD STRIP.AUTO-MCNC: 91 MG/DL (ref 70–110)

## 2025-04-29 PROCEDURE — 3600000002 HC SURGERY LEVEL 2 BASE: Performed by: ORTHOPAEDIC SURGERY

## 2025-04-29 PROCEDURE — 7100000010 HC PHASE II RECOVERY - FIRST 15 MIN: Performed by: ORTHOPAEDIC SURGERY

## 2025-04-29 PROCEDURE — 6360000002 HC RX W HCPCS: Performed by: ORTHOPAEDIC SURGERY

## 2025-04-29 PROCEDURE — 64721 CARPAL TUNNEL SURGERY: CPT | Performed by: ORTHOPAEDIC SURGERY

## 2025-04-29 PROCEDURE — 3700000000 HC ANESTHESIA ATTENDED CARE: Performed by: ORTHOPAEDIC SURGERY

## 2025-04-29 PROCEDURE — 7100000000 HC PACU RECOVERY - FIRST 15 MIN: Performed by: ORTHOPAEDIC SURGERY

## 2025-04-29 PROCEDURE — 3700000001 HC ADD 15 MINUTES (ANESTHESIA): Performed by: ORTHOPAEDIC SURGERY

## 2025-04-29 PROCEDURE — 2580000003 HC RX 258: Performed by: NURSE ANESTHETIST, CERTIFIED REGISTERED

## 2025-04-29 PROCEDURE — 3600000012 HC SURGERY LEVEL 2 ADDTL 15MIN: Performed by: ORTHOPAEDIC SURGERY

## 2025-04-29 PROCEDURE — 7100000001 HC PACU RECOVERY - ADDTL 15 MIN: Performed by: ORTHOPAEDIC SURGERY

## 2025-04-29 PROCEDURE — 6360000002 HC RX W HCPCS: Performed by: NURSE ANESTHETIST, CERTIFIED REGISTERED

## 2025-04-29 PROCEDURE — 82962 GLUCOSE BLOOD TEST: CPT

## 2025-04-29 PROCEDURE — 2709999900 HC NON-CHARGEABLE SUPPLY: Performed by: ORTHOPAEDIC SURGERY

## 2025-04-29 RX ORDER — SODIUM CHLORIDE, SODIUM LACTATE, POTASSIUM CHLORIDE, CALCIUM CHLORIDE 600; 310; 30; 20 MG/100ML; MG/100ML; MG/100ML; MG/100ML
INJECTION, SOLUTION INTRAVENOUS CONTINUOUS
Status: DISCONTINUED | OUTPATIENT
Start: 2025-04-29 | End: 2025-04-29 | Stop reason: HOSPADM

## 2025-04-29 RX ORDER — FENTANYL CITRATE 50 UG/ML
INJECTION, SOLUTION INTRAMUSCULAR; INTRAVENOUS
Status: DISCONTINUED | OUTPATIENT
Start: 2025-04-29 | End: 2025-04-29 | Stop reason: SDUPTHER

## 2025-04-29 RX ORDER — SODIUM CHLORIDE 9 MG/ML
INJECTION, SOLUTION INTRAVENOUS PRN
Status: DISCONTINUED | OUTPATIENT
Start: 2025-04-29 | End: 2025-04-29 | Stop reason: HOSPADM

## 2025-04-29 RX ORDER — SODIUM CHLORIDE 0.9 % (FLUSH) 0.9 %
5-40 SYRINGE (ML) INJECTION PRN
Status: DISCONTINUED | OUTPATIENT
Start: 2025-04-29 | End: 2025-04-29 | Stop reason: HOSPADM

## 2025-04-29 RX ORDER — DEXTROSE MONOHYDRATE 100 MG/ML
INJECTION, SOLUTION INTRAVENOUS CONTINUOUS PRN
Status: DISCONTINUED | OUTPATIENT
Start: 2025-04-29 | End: 2025-04-29 | Stop reason: HOSPADM

## 2025-04-29 RX ORDER — NALOXONE HYDROCHLORIDE 0.4 MG/ML
INJECTION, SOLUTION INTRAMUSCULAR; INTRAVENOUS; SUBCUTANEOUS PRN
Status: DISCONTINUED | OUTPATIENT
Start: 2025-04-29 | End: 2025-04-29 | Stop reason: HOSPADM

## 2025-04-29 RX ORDER — GLUCAGON 1 MG
1 KIT INJECTION PRN
Status: DISCONTINUED | OUTPATIENT
Start: 2025-04-29 | End: 2025-04-29 | Stop reason: HOSPADM

## 2025-04-29 RX ORDER — HYDROCODONE BITARTRATE AND ACETAMINOPHEN 5; 325 MG/1; MG/1
1 TABLET ORAL EVERY 6 HOURS PRN
Qty: 12 TABLET | Refills: 0 | Status: SHIPPED | OUTPATIENT
Start: 2025-04-29 | End: 2025-04-29

## 2025-04-29 RX ORDER — ONDANSETRON 2 MG/ML
4 INJECTION INTRAMUSCULAR; INTRAVENOUS
Status: DISCONTINUED | OUTPATIENT
Start: 2025-04-29 | End: 2025-04-29 | Stop reason: HOSPADM

## 2025-04-29 RX ORDER — FENTANYL CITRATE 50 UG/ML
25 INJECTION, SOLUTION INTRAMUSCULAR; INTRAVENOUS EVERY 5 MIN PRN
Refills: 0 | Status: DISCONTINUED | OUTPATIENT
Start: 2025-04-29 | End: 2025-04-29 | Stop reason: HOSPADM

## 2025-04-29 RX ORDER — PROPOFOL 10 MG/ML
INJECTION, EMULSION INTRAVENOUS
Status: DISCONTINUED | OUTPATIENT
Start: 2025-04-29 | End: 2025-04-29 | Stop reason: SDUPTHER

## 2025-04-29 RX ORDER — FAMOTIDINE 20 MG/1
20 TABLET, FILM COATED ORAL ONCE
Status: DISCONTINUED | OUTPATIENT
Start: 2025-04-29 | End: 2025-04-29 | Stop reason: HOSPADM

## 2025-04-29 RX ORDER — LIDOCAINE HYDROCHLORIDE 20 MG/ML
INJECTION, SOLUTION EPIDURAL; INFILTRATION; INTRACAUDAL; PERINEURAL
Status: DISCONTINUED | OUTPATIENT
Start: 2025-04-29 | End: 2025-04-29 | Stop reason: SDUPTHER

## 2025-04-29 RX ORDER — SODIUM CHLORIDE 0.9 % (FLUSH) 0.9 %
5-40 SYRINGE (ML) INJECTION EVERY 12 HOURS SCHEDULED
Status: DISCONTINUED | OUTPATIENT
Start: 2025-04-29 | End: 2025-04-29 | Stop reason: HOSPADM

## 2025-04-29 RX ORDER — HYDROCODONE BITARTRATE AND ACETAMINOPHEN 5; 325 MG/1; MG/1
1 TABLET ORAL EVERY 6 HOURS PRN
Qty: 12 TABLET | Refills: 0 | Status: SHIPPED | OUTPATIENT
Start: 2025-04-29 | End: 2025-05-02

## 2025-04-29 RX ORDER — CLINDAMYCIN PHOSPHATE 900 MG/50ML
900 INJECTION, SOLUTION INTRAVENOUS
Status: COMPLETED | OUTPATIENT
Start: 2025-04-29 | End: 2025-04-29

## 2025-04-29 RX ORDER — LIDOCAINE HYDROCHLORIDE 10 MG/ML
1 INJECTION, SOLUTION EPIDURAL; INFILTRATION; INTRACAUDAL; PERINEURAL
Status: DISCONTINUED | OUTPATIENT
Start: 2025-04-29 | End: 2025-04-29 | Stop reason: HOSPADM

## 2025-04-29 RX ORDER — MIDAZOLAM HYDROCHLORIDE 1 MG/ML
INJECTION, SOLUTION INTRAMUSCULAR; INTRAVENOUS
Status: DISCONTINUED | OUTPATIENT
Start: 2025-04-29 | End: 2025-04-29 | Stop reason: SDUPTHER

## 2025-04-29 RX ADMIN — PROPOFOL 20 MG: 10 INJECTION, EMULSION INTRAVENOUS at 10:42

## 2025-04-29 RX ADMIN — PROPOFOL 20 MG: 10 INJECTION, EMULSION INTRAVENOUS at 10:48

## 2025-04-29 RX ADMIN — FENTANYL CITRATE 25 MCG: 50 INJECTION INTRAMUSCULAR; INTRAVENOUS at 10:39

## 2025-04-29 RX ADMIN — PROPOFOL 20 MG: 10 INJECTION, EMULSION INTRAVENOUS at 10:53

## 2025-04-29 RX ADMIN — MIDAZOLAM 2 MG: 1 INJECTION, SOLUTION INTRAMUSCULAR; INTRAVENOUS at 10:32

## 2025-04-29 RX ADMIN — SODIUM CHLORIDE, SODIUM LACTATE, POTASSIUM CHLORIDE, AND CALCIUM CHLORIDE: .6; .31; .03; .02 INJECTION, SOLUTION INTRAVENOUS at 10:15

## 2025-04-29 RX ADMIN — CLINDAMYCIN PHOSPHATE 900 MG: 900 INJECTION, SOLUTION INTRAVENOUS at 10:32

## 2025-04-29 RX ADMIN — PROPOFOL 20 MG: 10 INJECTION, EMULSION INTRAVENOUS at 10:45

## 2025-04-29 RX ADMIN — PROPOFOL 50 MG: 10 INJECTION, EMULSION INTRAVENOUS at 10:39

## 2025-04-29 RX ADMIN — PROPOFOL 20 MG: 10 INJECTION, EMULSION INTRAVENOUS at 10:56

## 2025-04-29 RX ADMIN — PROPOFOL 20 MG: 10 INJECTION, EMULSION INTRAVENOUS at 10:51

## 2025-04-29 RX ADMIN — LIDOCAINE HYDROCHLORIDE 60 MG: 20 INJECTION, SOLUTION EPIDURAL; INFILTRATION; INTRACAUDAL; PERINEURAL at 10:39

## 2025-04-29 RX ADMIN — FENTANYL CITRATE 25 MCG: 50 INJECTION INTRAMUSCULAR; INTRAVENOUS at 10:42

## 2025-04-29 ASSESSMENT — PAIN SCALES - GENERAL
PAINLEVEL_OUTOF10: 0

## 2025-04-29 ASSESSMENT — PAIN - FUNCTIONAL ASSESSMENT: PAIN_FUNCTIONAL_ASSESSMENT: 0-10

## 2025-04-29 NOTE — OP NOTE
centrally in line with the incision.  A self-retaining retractor was placed.  As needed the palmaris brevis was feathered off the transverse carpal ligament.  The ligament was identified and incised centrally.  Attention was then turned distally where the ligament was released up to the level of the fat pad with motor branch was visualized.  Attention was subsequently turned proximally where the remainder of the palmar fascia was incised.  At this point the remainder of the transverse carpal ligament was released up to the level of but not including the antebrachial fascia.  The carpal tunnel was found to be completely released.  The wound was copiously irrigated.  The wound was closed with 4-0 chromic in interrupted fashion at the end of the procedure.  If not already done so prior to prep, quarter percent Marcaine plain was injected into the subcutaneous and deep tissues.    The operative field was cleansed and dried and placed into a sterile dressing, and splint if indicated.  The patient was sent to recovery in stable condition and given appropriate wound care instructions, follow-up with me in the outpatient setting, and a prescription for pain medicine.      Electronically signed by Edouard Cline DO on 4/29/2025 at 11:17 AM

## 2025-04-29 NOTE — PERIOP NOTE
Patient /Family /Designee has been informed that [facility] is not responsible for patient belongings per policy and the signed [facility] Patient Agreement document.  Personal items should be sent home or checked in with security.  Patient /Family /Designee selected the following action:                            []  Send personal items home with a family member or friend                                                 []  Check in personal items with security, excluding clothing                            [x]  Maintain personal items at the bedside, against recommendation                                 by Teodoro Ang [facility]              Belongings placed in pre op locked cabinet. Fit bit watch and  eyeglasses included.

## 2025-04-29 NOTE — ANESTHESIA POSTPROCEDURE EVALUATION
Department of Anesthesiology  Postprocedure Note    Patient: Stephanie Solomon  MRN: [id number]  YOB: 1970  Date of evaluation: 4/29/2025    Procedure Summary       Date: 04/29/25 Room / Location: [facility] MAIN 03 / [facility] MAIN OR    Anesthesia Start: 1032 Anesthesia Stop: 1114    Procedure: Left Carpal Tunnel Release (Left: Wrist) Diagnosis:       Carpal tunnel syndrome, left      (Carpal tunnel syndrome, left [G56.02])    Surgeons: Edouard Cline DO Responsible Provider: Bhavani Sepulveda MD    Anesthesia Type: MAC ASA Status: 3            Anesthesia Type: MAC    Claire Phase I: Claire Score: 10    Claire Phase II: Claire Score: 10    Anesthesia Post Evaluation    Patient location during evaluation: bedside  Patient participation: complete - patient participated  Level of consciousness: responsive to verbal stimuli  Airway patency: patent  Nausea & Vomiting: no nausea  Respiratory status: acceptable  Hydration status: euvolemic    No notable events documented.

## 2025-04-29 NOTE — ANESTHESIA PRE PROCEDURE
Department of Anesthesiology  Preprocedure Note       Name:  Stephanie Solomon   Age:  55 y.o.  :  1970                                          MRN:  [id number]         Date:  2025      Surgeon: Surgeon(s):  Edouard Cline DO    Procedure: Procedure(s):  Left Carpal Tunnel Release    Medications prior to admission:   Prior to Admission medications    Medication Sig Start Date End Date Taking? Authorizing Provider   ondansetron (ZOFRAN) 4 MG tablet TAKE 1 TABLET EVERY 4 HOURS AS NEEDED FOR NAUSEA 25  Yes Kayden Villagomez MD   vitamin D (ERGOCALCIFEROL) 1.25 MG (48835 UT) CAPS capsule Take 1 capsule by mouth every 7 days 10/28/24  Yes Kayden Villagomez MD   aspirin 81 MG EC tablet Take 1 tablet by mouth daily    Kayden Villagomez MD   [employer] COENZYME Q-10 100 MG CAPS capsule Take 2 capsules by mouth daily 3/10/25   Kayden Villagomez MD   Cyanocobalamin (VITAMIN B-12) 5000 MCG SUBL Place 5,000 mcg under the tongue daily    Kayden Villagomez MD   fluticasone-salmeterol (ADVAIR HFA) 230-21 MCG/ACT inhaler Inhale 2 puffs into the lungs 2 times daily Rinse mouth after use 2/10/25   Kayden Villagomez MD   ipratropium 0.5 mg-albuterol 2.5 mg (DUONEB) 0.5-2.5 (3) MG/3ML SOLN nebulizer solution Take 3 mLs by nebulization every 2 hours as needed for Shortness of Breath 25   Kayden Villagomez MD   XIIDRA 5 % SOLN Place 1 drop into both eyes in the morning and at bedtime  Patient not taking: Reported on 2025 3/7/25   Kayden Villagomez MD   OZEMPIC, 0.25 OR 0.5 MG/DOSE, 2 MG/3ML SOPN INJECT 0.25 MG ONCE WEEKLY FOR 4 WEEKS, THEN INCREASE TO 0.5 MG ONCE WEEKLY 3/21/25   Kayden Villagomez MD   XOLAIR 75 MG/0.5ML SOSY injection  24   Kayden Villagomez MD   ONETOUCH ULTRA strip  23   Kayden Villagomez MD   ondansetron (ZOFRAN-ODT) 4 MG disintegrating tablet Place 1 tablet under the tongue every 8 hours as needed 10/10/23   Provider, MD Kayden

## 2025-04-29 NOTE — H&P
Update History & Physical    The patient's History and Physical of April 11, 2025 was reviewed with the patient and I examined the patient. There was no change. The surgical site was confirmed by the patient and me.     Plan: The risks, benefits, expected outcome, and alternative to the recommended procedure have been discussed with the patient. Patient understands and wants to proceed with the procedure.     Electronically signed by Edouard Cline DO on 4/29/2025 at 10:01 AM

## 2025-04-29 NOTE — BRIEF OP NOTE
Brief Postoperative Note      Patient: Stephanie Solomon  YOB: 1970  MRN: [id number]    Date of Procedure: 4/29/2025    Pre-Op Diagnosis Codes:      * Carpal tunnel syndrome, left [G56.02]    Post-Op Diagnosis: Post-Op Diagnosis Codes:     * Carpal tunnel syndrome, left [G56.02]       Procedure(s):  Left Carpal Tunnel Release    Surgeon(s):  Edouard Cline DO    Assistant:  Surgical Assistant: Laly Velazquez    Anesthesia: Monitor Anesthesia Care    Estimated Blood Loss (mL): Minimal    Complications: None    Specimens:   * No specimens in log *    Implants:  * No implants in log *      Drains: * No LDAs found *    Findings:  Infection Present At Time Of Surgery (PATOS) (choose all levels that have infection present):  No infection present  Other Findings: flattened median nerve    Electronically signed by Edouard Cline DO on 4/29/2025 at 11:08 AM

## 2025-05-06 ENCOUNTER — TELEPHONE (OUTPATIENT)
Age: 55
End: 2025-05-06

## 2025-05-06 NOTE — TELEPHONE ENCOUNTER
Informed patient that its normal for sutures to dissolve and to keep the incision clean and dry. Patient said she would try to send a picture through [employer] and advised patient to to give the office a call back if anything changes Patient verbalized understanding.

## 2025-05-06 NOTE — TELEPHONE ENCOUNTER
Patient is requesting a call back because one of her stitches came out last night and now the patient states there is a little fluid coming from the incision.    Patient tel [phone]

## 2025-05-09 ENCOUNTER — OFFICE VISIT (OUTPATIENT)
Age: 55
End: 2025-05-09

## 2025-05-09 VITALS — WEIGHT: 181 LBS | HEIGHT: 58 IN | BODY MASS INDEX: 37.99 KG/M2

## 2025-05-09 DIAGNOSIS — Z98.890 S/P CARPAL TUNNEL RELEASE: ICD-10-CM

## 2025-05-09 DIAGNOSIS — R42 DIZZINESS: ICD-10-CM
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
## 2025-05-09 DIAGNOSIS — M25.532 LEFT WRIST PAIN: ICD-10-CM

## 2025-05-09 DIAGNOSIS — G56.02 CARPAL TUNNEL SYNDROME, LEFT: Primary | ICD-10-CM

## 2025-05-09 NOTE — PROGRESS NOTES
Stephanie Solomon is a 55 y.o. female right handed individual, not currently working.  Worker's Compensation and legal considerations: none    Chief Complaint   Patient presents with    Post-Op Check     Left wrist      Pain Score:   6    05/09/25 HPI: Patient presents today for a post operative visit approximately 2 weeks s/p left carpal tunnel release on 4/29/2025 with Dr. Cline. Today, the patient reports significant improvement in the numbness and tingling she was having prior to surgery.     Of note, the patient also reports 2-3 falls recently catching herself with her hands. She reports dizziness and has a history of vertigo.     4/11/2025 HPI: Patient presents today for follow-up regarding her left carpal tunnel syndrome.  She reports increasing frequency and dropping things as well as persistent numbness and tingling.  She is interested in getting set up for surgery for the left side.    8/23/2024 HPI: Patient presents today for left upper extremity EMG review as well as continuing pain in the right wrist status post ORIF in summer 2023 by trauma surgeon at [facility] HPI: Patient presents with + EMG for carpal tunnel on right.  She has a history of fibromyalgia.  She is requesting to just have it \"fixed\" and does not thing a steroid injection will work.    Date of onset: Chronic  Injury: Yes: Comment: Summer 2023 resulting in distal radius ORIF  Prior Treatment:  Yes: Comment: Right distal radius ORIF by another provider.  Right carpal tunnel release. Left carpal tunnel release    ROS: Review of Systems - General ROS: negative except HPI    Past Medical History:   Diagnosis Date    Anxiety     Asthma     Controlled    Bipolar 1 disorder (HCC)     Bladder cancer (HCC) 2002    bladder CA, cervical CA, skin CA    Cervical ca ([facility]) 1984    CTS (carpal tunnel syndrome) 06/2021    Deep vein thrombosis (HCC)     Distal radial fracture     DVT (deep venous thrombosis) ([facility])     x1    Eczema     Fatty

## 2025-06-10 ENCOUNTER — OFFICE VISIT (OUTPATIENT)
Age: 55
End: 2025-06-10

## 2025-06-10 VITALS — WEIGHT: 181 LBS | BODY MASS INDEX: 37.99 KG/M2 | HEIGHT: 58 IN

## 2025-06-10 DIAGNOSIS — L90.5 PAIN IN SURGICAL SCAR: Primary | ICD-10-CM

## 2025-06-10 DIAGNOSIS — R52 PAIN IN SURGICAL SCAR: Primary | ICD-10-CM

## 2025-06-10 DIAGNOSIS — G56.02 CARPAL TUNNEL SYNDROME, LEFT: ICD-10-CM

## 2025-06-10 DIAGNOSIS — Z98.890 S/P CARPAL TUNNEL RELEASE: ICD-10-CM

## 2025-06-10 PROCEDURE — 99024 POSTOP FOLLOW-UP VISIT: CPT

## 2025-06-10 NOTE — PROGRESS NOTES
Stephanie Solomon is a 55 y.o. female right handed individual, not currently working.  Worker's Compensation and legal considerations: none    Chief Complaint   Patient presents with    Follow-up     Left CTR     Pain Score:   2    06/10/25 HPI: Patient presents today for a post operative visit approximately 6 weeks s/p left carpal tunnel release on 4/29/2025 with Dr. Cline. Today, the patient reports pain at the surgical scar and shooting pains into the thumb.    05/09/25 HPI: Patient presents today for a post operative visit approximately 2 weeks s/p left carpal tunnel release on 4/29/2025 with Dr. Cline. Today, the patient reports significant improvement in the numbness and tingling she was having prior to surgery.     Of note, the patient also reports 2-3 falls recently catching herself with her hands. She reports dizziness and has a history of vertigo.     4/11/2025 HPI: Patient presents today for follow-up regarding her left carpal tunnel syndrome.  She reports increasing frequency and dropping things as well as persistent numbness and tingling.  She is interested in getting set up for surgery for the left side.    8/23/2024 HPI: Patient presents today for left upper extremity EMG review as well as continuing pain in the right wrist status post ORIF in summer 2023 by trauma surgeon at [facility]     Initial HPI: Patient presents with + EMG for carpal tunnel on right.  She has a history of fibromyalgia.  She is requesting to just have it \"fixed\" and does not thing a steroid injection will work.    Date of onset: Chronic  Injury: Yes: Comment: Summer 2023 resulting in distal radius ORIF  Prior Treatment:  Yes: Comment: Right distal radius ORIF by another provider.  Right carpal tunnel release. Left carpal tunnel release    ROS: Review of Systems - General ROS: negative except HPI    Past Medical History:   Diagnosis Date    Anxiety     Asthma     Controlled    Bipolar 1 disorder (HCC)     Bladder cancer (HCC)

## (undated) DEVICE — GAUZE,SPONGE,4"X4",12PLY,STERILE,LF,2'S: Brand: MEDLINE

## (undated) DEVICE — BLADE OPHTH MINI BEAV SHRP --

## (undated) DEVICE — BIPOLAR FORCEPS CORD: Brand: VALLEYLAB

## (undated) DEVICE — DISPOSABLE TOURNIQUET CUFF SINGLE BLADDER, DUAL PORT AND QUICK CONNECT CONNECTOR: Brand: COLOR CUFF

## (undated) DEVICE — SYR LR LCK 1ML GRAD NSAF 30ML --

## (undated) DEVICE — KNIFE CARPAL TUNNEL 15 DEG

## (undated) DEVICE — CANNULA PERF L2IN BLNT TIP 2MM VES CLR RADPQ BODY FEM LUER

## (undated) DEVICE — GOWN CHEMO 2XL BLU POLY MULTILAYER COAT ISOLATN W HK LOOP

## (undated) DEVICE — GAUZE,SPONGE,4"X4",16PLY,STRL,LF,10/TRAY: Brand: MEDLINE

## (undated) DEVICE — PACK PROCEDURE SURG MAJ W/ BASIN LF

## (undated) DEVICE — GARMENT,MEDLINE,DVT,SEQUENTIAL,CALF,MD: Brand: MEDLINE

## (undated) DEVICE — GLOVE SURG SZ 8 CRM LTX FREE POLYISOPRENE POLYMER BEAD ANTI

## (undated) DEVICE — DRAPE,HAND,STERILE: Brand: MEDLINE

## (undated) DEVICE — GOWN,SIRUS,FABRNF,2XL,18/CS: Brand: MEDLINE

## (undated) DEVICE — DRAPE,REIN 53X77,STERILE: Brand: MEDLINE

## (undated) DEVICE — COVER LT HNDL FLX

## (undated) DEVICE — CORD ES L12FT BPLR FRCP

## (undated) DEVICE — BLADE OPHTH GRN ROUNDED TIP 1 SIDE SHRP GRINDLESS MINI-BLDE

## (undated) DEVICE — STRAP,POSITIONING,KNEE/BODY,FOAM,4X60": Brand: MEDLINE

## (undated) DEVICE — BANDAGE,ELASTIC,ESMARK,STERILE,4"X9',LF: Brand: MEDLINE

## (undated) DEVICE — SYR 10ML LUER LOK 1/5ML GRAD --

## (undated) DEVICE — CURITY NON-ADHERENT STRIPS: Brand: CURITY

## (undated) DEVICE — KIT CLN UP BON SECOURS MARYV

## (undated) DEVICE — BANDAGE COMPR W1INXL5YD WHT COT E TAPE RUB BASE ADH CURAD

## (undated) DEVICE — HAND II-LF: Brand: MEDLINE INDUSTRIES, INC.

## (undated) DEVICE — PADDING CAST COHESIVE 4 YDX3 IN HND TEARABLE COTTON SPEC 100

## (undated) DEVICE — PREP SKN CHLRAPRP APL 26ML STR --

## (undated) DEVICE — INTENDED FOR TISSUE SEPARATION, AND OTHER PROCEDURES THAT REQUIRE A SHARP SURGICAL BLADE TO PUNCTURE OR CUT.: Brand: BARD-PARKER ®  SAFETY SCALPED

## (undated) DEVICE — INTENDED FOR TISSUE SEPARATION, AND OTHER PROCEDURES THAT REQUIRE A SHARP SURGICAL BLADE TO PUNCTURE OR CUT.: Brand: BARD-PARKER SAFETY BLADES SIZE 15, STERILE

## (undated) DEVICE — BANDAGE COMPR EXSANGUATION SGL LAYERED NO CLSR 9FT LEN 4IN W

## (undated) DEVICE — SUT CHRMC 4-0 18IN PS2 BRN --

## (undated) DEVICE — ZIMMER® STERILE DISPOSABLE TOURNIQUET CUFF WITH PROTECTIVE SLEEVE AND PLC, DUAL PORT, SINGLE BLADDER, 18 IN. (46 CM)

## (undated) DEVICE — DRESSING,GAUZE,XEROFORM,CURAD,1"X8",ST: Brand: CURAD

## (undated) DEVICE — BNDG CMPR ELAS KNT VEL STD 3IN -- MEDICHOICE

## (undated) DEVICE — BLANKET WRM AD W50XL85.8IN PACU FULL BODY FORC AIR

## (undated) DEVICE — APPLICATOR MEDICATED 26 CC SOLUTION HI LT ORNG CHLORAPREP

## (undated) DEVICE — GLOVE SURG SZ 8 L12IN FNGR THK79MIL GRN LTX FREE